# Patient Record
Sex: FEMALE | Race: WHITE | NOT HISPANIC OR LATINO | Employment: UNEMPLOYED | URBAN - METROPOLITAN AREA
[De-identification: names, ages, dates, MRNs, and addresses within clinical notes are randomized per-mention and may not be internally consistent; named-entity substitution may affect disease eponyms.]

---

## 2023-08-08 ENCOUNTER — HOSPITAL ENCOUNTER (OUTPATIENT)
Dept: DATA CONVERSION | Facility: HOSPITAL | Age: 43
Discharge: HOME | End: 2023-08-08

## 2023-08-08 DIAGNOSIS — E11.9 TYPE 2 DIABETES MELLITUS WITHOUT COMPLICATIONS (MULTI): ICD-10-CM

## 2023-08-08 LAB
ALBUMIN SERPL-MCNC: 3.9 GM/DL (ref 3.5–5)
ALBUMIN/GLOB SERPL: 1.4 RATIO (ref 1.5–3)
ALP BLD-CCNC: 86 U/L (ref 35–125)
ALT SERPL-CCNC: 23 U/L (ref 5–40)
ANION GAP SERPL CALCULATED.3IONS-SCNC: 11 MMOL/L (ref 0–19)
AST SERPL-CCNC: 20 U/L (ref 5–40)
BILIRUB SERPL-MCNC: 0.2 MG/DL (ref 0.1–1.2)
BUN SERPL-MCNC: 8 MG/DL (ref 8–25)
BUN/CREAT SERPL: 11.4 RATIO (ref 8–21)
CALCIUM SERPL-MCNC: 9.3 MG/DL (ref 8.5–10.4)
CHLORIDE SERPL-SCNC: 96 MMOL/L (ref 97–107)
CO2 SERPL-SCNC: 28 MMOL/L (ref 24–31)
CREAT SERPL-MCNC: 0.7 MG/DL (ref 0.4–1.6)
GFR SERPL CREATININE-BSD FRML MDRD: 109 ML/MIN/1.73 M2
GLOBULIN SER-MCNC: 2.8 G/DL (ref 1.9–3.7)
GLUCOSE SERPL-MCNC: 197 MG/DL (ref 65–99)
HBA1C MFR BLD: 9.2 % (ref 4–6)
POTASSIUM SERPL-SCNC: 4 MMOL/L (ref 3.4–5.1)
PROT SERPL-MCNC: 6.7 G/DL (ref 5.9–7.9)
SODIUM SERPL-SCNC: 135 MMOL/L (ref 133–145)

## 2023-08-10 ENCOUNTER — HOSPITAL ENCOUNTER (OUTPATIENT)
Dept: DATA CONVERSION | Facility: HOSPITAL | Age: 43
Discharge: HOME | End: 2023-08-10

## 2023-08-10 DIAGNOSIS — E11.9 TYPE 2 DIABETES MELLITUS WITHOUT COMPLICATIONS (MULTI): ICD-10-CM

## 2023-08-10 LAB
CREAT UR-MCNC: 49 MG/DL
MICROALBUMIN UR-MCNC: 12 MG/L (ref 0–23)
MICROALBUMIN/CREAT UR: 24.5 MG/G (ref 0–30)

## 2023-09-11 ENCOUNTER — HOSPITAL ENCOUNTER (OUTPATIENT)
Dept: DATA CONVERSION | Facility: HOSPITAL | Age: 43
Discharge: HOME | End: 2023-09-11
Payer: COMMERCIAL

## 2023-09-11 DIAGNOSIS — R07.9 CHEST PAIN, UNSPECIFIED: ICD-10-CM

## 2023-09-16 PROBLEM — E87.1 HYPONATREMIA: Status: ACTIVE | Noted: 2022-08-16

## 2023-09-16 PROBLEM — B02.9 HERPES ZOSTER: Status: ACTIVE | Noted: 2022-08-23

## 2023-09-16 PROBLEM — E11.9 DIABETES MELLITUS (MULTI): Status: ACTIVE | Noted: 2021-12-15

## 2023-09-16 PROBLEM — Z14.8 CARRIER OF GENETIC DISORDER: Status: ACTIVE | Noted: 2023-09-16

## 2023-09-16 PROBLEM — R45.1 RESTLESSNESS AND AGITATION: Status: ACTIVE | Noted: 2023-09-16

## 2023-09-16 PROBLEM — Z15.89 HLA B27 (HLA B27 POSITIVE): Status: ACTIVE | Noted: 2023-09-16

## 2023-09-16 PROBLEM — M79.7 FIBROMYALGIA SYNDROME: Status: ACTIVE | Noted: 2023-09-16

## 2023-09-16 PROBLEM — R53.83 FATIGUE: Status: ACTIVE | Noted: 2021-12-15

## 2023-09-16 PROBLEM — G83.4 CAUDA EQUINA SYNDROME (MULTI): Status: ACTIVE | Noted: 2022-03-01

## 2023-09-16 PROBLEM — E87.1 HYPO-OSMOLALITY AND HYPONATREMIA: Status: ACTIVE | Noted: 2023-09-16

## 2023-09-16 PROBLEM — G89.29 OTHER CHRONIC PAIN: Status: ACTIVE | Noted: 2023-09-16

## 2023-09-16 PROBLEM — R20.2 PARESTHESIA: Status: ACTIVE | Noted: 2023-09-16

## 2023-09-16 PROBLEM — M65.30 TRIGGER FINGER: Status: ACTIVE | Noted: 2023-02-21

## 2023-09-16 PROBLEM — E55.9 VITAMIN D DEFICIENCY: Status: ACTIVE | Noted: 2023-05-08

## 2023-09-16 PROBLEM — J45.909 ASTHMA (HHS-HCC): Status: ACTIVE | Noted: 2021-12-15

## 2023-09-16 PROBLEM — M45.9 ANKYLOSING SPONDYLITIS (MULTI): Status: ACTIVE | Noted: 2022-08-23

## 2023-09-16 PROBLEM — R63.4 WEIGHT LOSS: Status: ACTIVE | Noted: 2021-12-15

## 2023-09-16 PROBLEM — R29.898 LEG WEAKNESS: Status: ACTIVE | Noted: 2022-03-01

## 2023-09-16 PROBLEM — M79.2 THORACIC NEURALGIA: Status: ACTIVE | Noted: 2023-09-16

## 2023-09-16 PROBLEM — M47.816 LUMBAR SPONDYLOSIS: Status: ACTIVE | Noted: 2023-09-16

## 2023-09-16 PROBLEM — F41.9 ANXIETY: Status: ACTIVE | Noted: 2022-06-24

## 2023-09-16 PROBLEM — F32.A DEPRESSION: Status: ACTIVE | Noted: 2022-06-24

## 2023-09-16 PROBLEM — M47.817 LUMBOSACRAL SPONDYLOSIS WITHOUT MYELOPATHY: Status: ACTIVE | Noted: 2023-09-16

## 2023-09-16 PROBLEM — S66.919A RUPTURE OF TENDON OF HAND: Status: ACTIVE | Noted: 2023-07-14

## 2023-09-16 PROBLEM — G62.9 POLYNEUROPATHY: Status: ACTIVE | Noted: 2023-09-16

## 2023-09-16 PROBLEM — M13.80 OLIGOARTHRITIS: Status: ACTIVE | Noted: 2023-09-16

## 2023-09-16 PROBLEM — Z86.73 HISTORY OF CEREBROVASCULAR ACCIDENT: Status: ACTIVE | Noted: 2022-07-08

## 2023-09-16 PROBLEM — R82.90 ABNORMAL FINDING IN URINE: Status: ACTIVE | Noted: 2022-04-12

## 2023-09-16 PROBLEM — E11.40 DIABETIC NEUROPATHY (MULTI): Status: ACTIVE | Noted: 2021-12-15

## 2023-09-16 PROBLEM — E86.1 HYPOVOLEMIA: Status: ACTIVE | Noted: 2023-09-16

## 2023-09-16 PROBLEM — M06.9 RHEUMATOID ARTHRITIS (MULTI): Status: ACTIVE | Noted: 2023-09-16

## 2023-09-16 PROBLEM — F10.939 ALCOHOL WITHDRAWAL SYNDROME (MULTI): Status: ACTIVE | Noted: 2023-09-16

## 2023-09-16 PROBLEM — M47.814 THORACIC SPONDYLOSIS: Status: ACTIVE | Noted: 2023-09-16

## 2023-09-16 PROBLEM — E78.5 HYPERLIPIDEMIA: Status: ACTIVE | Noted: 2022-06-24

## 2023-09-16 PROBLEM — F41.1 GENERALIZED ANXIETY DISORDER: Status: ACTIVE | Noted: 2023-09-16

## 2023-09-16 PROBLEM — I63.9 STROKE (MULTI): Status: ACTIVE | Noted: 2022-06-24

## 2023-09-16 PROBLEM — L02.214 INGUINAL ABSCESS: Status: ACTIVE | Noted: 2021-10-14

## 2023-09-16 PROBLEM — E11.9 TYPE 2 DIABETES MELLITUS WITHOUT COMPLICATION (MULTI): Status: ACTIVE | Noted: 2023-02-16

## 2023-09-16 PROBLEM — F17.210 CIGARETTE NICOTINE DEPENDENCE: Status: ACTIVE | Noted: 2023-02-06

## 2023-09-16 PROBLEM — D68.59 HYPERCOAGULATION SYNDROME (MULTI): Status: ACTIVE | Noted: 2023-09-16

## 2023-09-16 PROBLEM — R41.82 ALTERED MENTAL STATUS: Status: ACTIVE | Noted: 2023-09-16

## 2023-09-16 PROBLEM — D68.8 HEREDITARY THROMBOPHILIA (MULTI): Status: ACTIVE | Noted: 2023-02-10

## 2023-09-16 RX ORDER — FLUTICASONE PROPIONATE 50 MCG
2 SPRAY, SUSPENSION (ML) NASAL DAILY
COMMUNITY
Start: 2022-08-16 | End: 2023-10-03 | Stop reason: ALTCHOICE

## 2023-09-16 RX ORDER — DICLOFENAC SODIUM 10 MG/G
2 GEL TOPICAL 4 TIMES DAILY PRN
COMMUNITY
Start: 2023-02-21 | End: 2023-10-03 | Stop reason: ALTCHOICE

## 2023-09-16 RX ORDER — LANCETS
EACH MISCELLANEOUS
COMMUNITY
End: 2024-03-13 | Stop reason: SDUPTHER

## 2023-09-16 RX ORDER — IBUPROFEN 200 MG
CAPSULE ORAL DAILY
COMMUNITY
Start: 2021-12-15 | End: 2024-03-13 | Stop reason: ALTCHOICE

## 2023-09-16 RX ORDER — ATORVASTATIN CALCIUM 20 MG/1
1 TABLET, FILM COATED ORAL DAILY
COMMUNITY
Start: 2022-07-08 | End: 2023-10-03 | Stop reason: ALTCHOICE

## 2023-09-16 RX ORDER — METFORMIN HYDROCHLORIDE 500 MG/1
3 TABLET, EXTENDED RELEASE ORAL DAILY
COMMUNITY
Start: 2022-12-29 | End: 2023-10-03 | Stop reason: SDUPTHER

## 2023-09-16 RX ORDER — OMEPRAZOLE 20 MG/1
1 CAPSULE, DELAYED RELEASE ORAL DAILY
COMMUNITY
Start: 2023-02-10 | End: 2023-10-03 | Stop reason: ALTCHOICE

## 2023-09-16 RX ORDER — TRAMADOL HYDROCHLORIDE 50 MG/1
1 TABLET ORAL 2 TIMES DAILY PRN
COMMUNITY
Start: 2023-02-06 | End: 2023-10-03 | Stop reason: ALTCHOICE

## 2023-09-16 RX ORDER — INSULIN DEGLUDEC 100 U/ML
32 INJECTION, SOLUTION SUBCUTANEOUS
COMMUNITY
Start: 2023-06-16 | End: 2024-02-20

## 2023-09-16 RX ORDER — CLOPIDOGREL BISULFATE 75 MG/1
1 TABLET ORAL DAILY
COMMUNITY

## 2023-09-16 RX ORDER — CHOLECALCIFEROL (VITAMIN D3) 1250 MCG
1 TABLET ORAL
COMMUNITY
Start: 2023-03-13 | End: 2024-01-05

## 2023-09-16 RX ORDER — METFORMIN HYDROCHLORIDE 500 MG/1
1 TABLET ORAL 3 TIMES DAILY
COMMUNITY
End: 2023-10-03 | Stop reason: SDUPTHER

## 2023-09-16 RX ORDER — ALBUTEROL SULFATE 90 UG/1
1 AEROSOL, METERED RESPIRATORY (INHALATION) EVERY 4 HOURS
COMMUNITY
End: 2023-10-03 | Stop reason: SDUPTHER

## 2023-09-16 RX ORDER — ALBUTEROL SULFATE 90 UG/1
2 AEROSOL, METERED RESPIRATORY (INHALATION) EVERY 6 HOURS PRN
COMMUNITY
Start: 2023-06-12 | End: 2023-10-03 | Stop reason: SDUPTHER

## 2023-09-16 RX ORDER — BECLOMETHASONE DIPROPIONATE HFA 80 UG/1
1 AEROSOL, METERED RESPIRATORY (INHALATION)
COMMUNITY
End: 2023-10-03 | Stop reason: ALTCHOICE

## 2023-09-16 RX ORDER — ATORVASTATIN CALCIUM 10 MG/1
1 TABLET, FILM COATED ORAL DAILY
COMMUNITY
End: 2023-10-03 | Stop reason: ALTCHOICE

## 2023-09-16 RX ORDER — DAPAGLIFLOZIN 5 MG/1
1 TABLET, FILM COATED ORAL DAILY
COMMUNITY
Start: 2023-06-16 | End: 2023-11-15

## 2023-09-21 ENCOUNTER — DOCUMENTATION (OUTPATIENT)
Dept: CARE COORDINATION | Facility: CLINIC | Age: 43
End: 2023-09-21
Payer: COMMERCIAL

## 2023-10-02 DIAGNOSIS — Z79.4 TYPE 2 DIABETES MELLITUS WITH DIABETIC POLYNEUROPATHY, WITH LONG-TERM CURRENT USE OF INSULIN (MULTI): Primary | ICD-10-CM

## 2023-10-02 DIAGNOSIS — J45.20 MILD INTERMITTENT ASTHMA, UNSPECIFIED WHETHER COMPLICATED (HHS-HCC): ICD-10-CM

## 2023-10-02 DIAGNOSIS — E11.42 TYPE 2 DIABETES MELLITUS WITH DIABETIC POLYNEUROPATHY, WITH LONG-TERM CURRENT USE OF INSULIN (MULTI): Primary | ICD-10-CM

## 2023-10-03 ENCOUNTER — TELEPHONE (OUTPATIENT)
Dept: PRIMARY CARE | Facility: CLINIC | Age: 43
End: 2023-10-03

## 2023-10-03 ENCOUNTER — OFFICE VISIT (OUTPATIENT)
Dept: PRIMARY CARE | Facility: CLINIC | Age: 43
End: 2023-10-03
Payer: COMMERCIAL

## 2023-10-03 VITALS
BODY MASS INDEX: 35.16 KG/M2 | DIASTOLIC BLOOD PRESSURE: 90 MMHG | TEMPERATURE: 98.6 F | OXYGEN SATURATION: 99 % | HEART RATE: 101 BPM | HEIGHT: 67 IN | SYSTOLIC BLOOD PRESSURE: 142 MMHG | WEIGHT: 224 LBS

## 2023-10-03 DIAGNOSIS — J06.9 ACUTE URI: Primary | ICD-10-CM

## 2023-10-03 DIAGNOSIS — R05.1 ACUTE COUGH: ICD-10-CM

## 2023-10-03 DIAGNOSIS — M79.10 MYALGIA: ICD-10-CM

## 2023-10-03 PROCEDURE — 3046F HEMOGLOBIN A1C LEVEL >9.0%: CPT | Performed by: FAMILY MEDICINE

## 2023-10-03 PROCEDURE — 3077F SYST BP >= 140 MM HG: CPT | Performed by: FAMILY MEDICINE

## 2023-10-03 PROCEDURE — 3080F DIAST BP >= 90 MM HG: CPT | Performed by: FAMILY MEDICINE

## 2023-10-03 PROCEDURE — 99213 OFFICE O/P EST LOW 20 MIN: CPT | Performed by: FAMILY MEDICINE

## 2023-10-03 RX ORDER — METHOCARBAMOL 500 MG/1
TABLET, FILM COATED ORAL
COMMUNITY
Start: 2023-08-11 | End: 2023-11-15 | Stop reason: WASHOUT

## 2023-10-03 RX ORDER — ASPIRIN 81 MG/1
TABLET ORAL
COMMUNITY
Start: 2022-08-13

## 2023-10-03 RX ORDER — OXYBUTYNIN CHLORIDE 10 MG/1
10 TABLET, EXTENDED RELEASE ORAL
COMMUNITY
Start: 2023-09-13 | End: 2024-02-28 | Stop reason: ALTCHOICE

## 2023-10-03 RX ORDER — GABAPENTIN 300 MG/1
300 CAPSULE ORAL 3 TIMES DAILY
COMMUNITY
Start: 2023-09-25 | End: 2023-10-03 | Stop reason: SINTOL

## 2023-10-03 RX ORDER — ALBUTEROL SULFATE 90 UG/1
AEROSOL, METERED RESPIRATORY (INHALATION)
Qty: 36 G | Refills: 0 | Status: SHIPPED | OUTPATIENT
Start: 2023-10-03

## 2023-10-03 RX ORDER — BENZONATATE 200 MG/1
200 CAPSULE ORAL 3 TIMES DAILY PRN
Qty: 20 CAPSULE | Refills: 0 | Status: SHIPPED | OUTPATIENT
Start: 2023-10-03 | End: 2023-11-15 | Stop reason: ALTCHOICE

## 2023-10-03 RX ORDER — ESCITALOPRAM OXALATE 10 MG/1
10 TABLET ORAL NIGHTLY
COMMUNITY
Start: 2023-08-10 | End: 2023-10-03 | Stop reason: SINTOL

## 2023-10-03 RX ORDER — ALBUTEROL SULFATE 90 UG/1
2 AEROSOL, METERED RESPIRATORY (INHALATION) EVERY 6 HOURS PRN
Qty: 18 G | Refills: 0 | Status: SHIPPED | OUTPATIENT
Start: 2023-10-03 | End: 2023-10-03 | Stop reason: SDUPTHER

## 2023-10-03 RX ORDER — METFORMIN HYDROCHLORIDE 500 MG/1
1500 TABLET, EXTENDED RELEASE ORAL DAILY
Qty: 270 TABLET | Refills: 0 | Status: SHIPPED | OUTPATIENT
Start: 2023-10-03 | End: 2024-01-16

## 2023-10-03 RX ORDER — HYDROCODONE BITARTRATE AND ACETAMINOPHEN 5; 325 MG/1; MG/1
TABLET ORAL
COMMUNITY
Start: 2023-07-14 | End: 2023-10-03 | Stop reason: ALTCHOICE

## 2023-10-03 RX ORDER — DULOXETIN HYDROCHLORIDE 60 MG/1
CAPSULE, DELAYED RELEASE ORAL
COMMUNITY
Start: 2022-10-23 | End: 2023-10-03 | Stop reason: SINTOL

## 2023-10-03 RX ORDER — FLASH GLUCOSE SENSOR
KIT MISCELLANEOUS
COMMUNITY
Start: 2023-06-08 | End: 2023-10-03 | Stop reason: ALTCHOICE

## 2023-10-03 RX ORDER — PEN NEEDLE, DIABETIC 31 GX3/16"
NEEDLE, DISPOSABLE MISCELLANEOUS
COMMUNITY
Start: 2023-09-21

## 2023-10-03 RX ORDER — AMITRIPTYLINE HYDROCHLORIDE 25 MG/1
1 TABLET, FILM COATED ORAL NIGHTLY
COMMUNITY
Start: 2023-09-29 | End: 2023-11-20 | Stop reason: SDDI

## 2023-10-03 ASSESSMENT — ENCOUNTER SYMPTOMS
NECK PAIN: 1
MYALGIAS: 1
RHINORRHEA: 0
HEADACHES: 0
SORE THROAT: 0
BACK PAIN: 1
WHEEZING: 1
COUGH: 1
SINUS PRESSURE: 1

## 2023-10-03 ASSESSMENT — PATIENT HEALTH QUESTIONNAIRE - PHQ9
2. FEELING DOWN, DEPRESSED OR HOPELESS: MORE THAN HALF THE DAYS
SUM OF ALL RESPONSES TO PHQ9 QUESTIONS 1 AND 2: 2
1. LITTLE INTEREST OR PLEASURE IN DOING THINGS: NOT AT ALL
10. IF YOU CHECKED OFF ANY PROBLEMS, HOW DIFFICULT HAVE THESE PROBLEMS MADE IT FOR YOU TO DO YOUR WORK, TAKE CARE OF THINGS AT HOME, OR GET ALONG WITH OTHER PEOPLE: NOT DIFFICULT AT ALL

## 2023-10-03 ASSESSMENT — PAIN SCALES - GENERAL: PAINLEVEL: 7

## 2023-10-03 NOTE — TELEPHONE ENCOUNTER
FYI  Pt sent me this email last night, 10/2/23  Nicole I am sorry but I can no longer log in to everything. I am having a very hard time moving my neck from side to side. I wanted to ask for the neurologist, or pain management, or demetrius to figure out what to do because the last time i couldnt move it I had a stroke. [facility] doesnt offer any portal anymore to ask    Instructed pt to call answering service for symptom evaluation     Patient calm. Wife is not present.   Signed by chaplain Marley

## 2023-10-03 NOTE — PROGRESS NOTES
"Subjective   Patient ID: Alanna Hickman is a 42 y.o. female who presents for Back Pain (X 5 days ), Cough, and Neck Pain (X 1 day /States feels the same as when pt had carotid artery dissection last year ).  The back pain is located in the mid back where her bra is located.  The pain moved up to the base of the neck.  The cough is productive with clear sputum.  She also admits to wheezing and sinus pressure.  She denies throat pain, otalgia, rhinorrhea, ear pressure, headaches, and post nasal drainage.  She took a left-over prednisone pill which alleviated the symptoms greatly.  She has not taken anything else for the current symptoms including the albuterol inhaler.  She is out of the inhaler and refills.  Her daughter has URI symptoms as well.  When she coughs, the back is sore as well as the ribs.  She had pain with movement two days ago.  The cough is better today.      Review of Systems   HENT:  Positive for sinus pressure. Negative for ear pain, postnasal drip, rhinorrhea and sore throat.    Respiratory:  Positive for cough and wheezing.    Musculoskeletal:  Positive for back pain, myalgias and neck pain.   Neurological:  Negative for headaches.     Objective   /90 (BP Location: Left arm)   Pulse 101   Temp 37 °C (98.6 °F) (Temporal)   Ht 1.702 m (5' 7\")   Wt 102 kg (224 lb)   SpO2 99%   BMI 35.08 kg/m²     Physical Exam  Constitutional:       Appearance: Normal appearance. She is obese.   HENT:      Right Ear: Hearing, ear canal and external ear normal. A middle ear effusion is present. Tympanic membrane is not erythematous.      Left Ear: Hearing, ear canal and external ear normal. A middle ear effusion is present. Tympanic membrane is not erythematous.      Nose: Mucosal edema present. No rhinorrhea.      Right Turbinates: Swollen.      Left Turbinates: Swollen.      Mouth/Throat:      Mouth: Mucous membranes are moist.      Pharynx: Oropharynx is clear.   Eyes:      Conjunctiva/sclera: " Conjunctivae normal.   Neurological:      Mental Status: She is alert.     Assessment/Plan   Problem List Items Addressed This Visit    None  Visit Diagnoses         Codes    Acute URI    -  Primary J06.9    Acute cough     R05.1    Relevant Medications    albuterol (Ventolin HFA) 90 mcg/actuation inhaler    benzonatate (Tessalon) 200 mg capsule    Myalgia     M79.10        New.  Appears viral at this time.  Tessalon Perles prescribed.  Albuterol inhaler refilled.  Moist heat to back and neck to help muscle relax.  May take home supply of Robaxin.  Adequate hydration.  Get plenty of rest.  Follow up if colored sputum or rhinorrhea occurs.

## 2023-10-03 NOTE — TELEPHONE ENCOUNTER
It looks like she is coming in today for acute visit with LYL  Agree with need for Neurology visit

## 2023-10-04 ENCOUNTER — PROCEDURE VISIT (OUTPATIENT)
Dept: PAIN MEDICINE | Facility: CLINIC | Age: 43
End: 2023-10-04
Payer: COMMERCIAL

## 2023-10-04 DIAGNOSIS — G62.9 POLYNEUROPATHY: ICD-10-CM

## 2023-10-04 DIAGNOSIS — R20.2 PARESTHESIA: Primary | ICD-10-CM

## 2023-10-04 DIAGNOSIS — G56.03 BILATERAL CARPAL TUNNEL SYNDROME: ICD-10-CM

## 2023-10-04 PROCEDURE — 95886 MUSC TEST DONE W/N TEST COMP: CPT | Performed by: PHYSICAL MEDICINE & REHABILITATION

## 2023-10-04 PROCEDURE — 95911 NRV CNDJ TEST 9-10 STUDIES: CPT | Performed by: PHYSICAL MEDICINE & REHABILITATION

## 2023-10-04 NOTE — PROGRESS NOTES
Patient ID: Alanna Hickman is a 42 y.o. female.    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  A concomitant carpal tunnel syndrome cannot be excluded in the setting of the polyneuropathy.    There is no evidence of motor radiculopathy or myopathy in the upper limbs.

## 2023-10-05 ENCOUNTER — APPOINTMENT (OUTPATIENT)
Dept: RADIOLOGY | Facility: CLINIC | Age: 43
End: 2023-10-05
Payer: COMMERCIAL

## 2023-10-12 ENCOUNTER — PREP FOR PROCEDURE (OUTPATIENT)
Dept: PAIN MEDICINE | Facility: CLINIC | Age: 43
End: 2023-10-12
Payer: COMMERCIAL

## 2023-10-18 ENCOUNTER — PATIENT OUTREACH (OUTPATIENT)
Dept: CARE COORDINATION | Facility: CLINIC | Age: 43
End: 2023-10-18
Payer: COMMERCIAL

## 2023-10-18 NOTE — CARE PLAN
This SW has sent Pt an email today asking if she has been connected to a Behavioral Health Agency such as [facility] or [facility] (previous notes in previous emr indicate that the Pt was working on getting connected to an agency for anxiety/depression.   Problem: Coordination of Community Resources Needed  Goal: Coordination of Services will be Obtained  Outcome: Progressing  Intervention: Coordinate care to local community resources  Note: Per notes on 9/29/23:  Pt had met with CNP/Psych.  This SW needed more clarification from Pt. Email was sent.      Problem: Coordination of Psychosocial Support Services Needed  Goal: Coordination of Services will be Obtained  Outcome: Progressing

## 2023-10-19 ENCOUNTER — TELEPHONE (OUTPATIENT)
Dept: PRIMARY CARE | Facility: CLINIC | Age: 43
End: 2023-10-19

## 2023-10-20 ENCOUNTER — OFFICE VISIT (OUTPATIENT)
Dept: PAIN MEDICINE | Facility: CLINIC | Age: 43
End: 2023-10-20
Payer: COMMERCIAL

## 2023-10-20 VITALS — SYSTOLIC BLOOD PRESSURE: 118 MMHG | DIASTOLIC BLOOD PRESSURE: 80 MMHG | HEART RATE: 83 BPM | RESPIRATION RATE: 22 BRPM

## 2023-10-20 DIAGNOSIS — M47.817 LUMBOSACRAL SPONDYLOSIS WITHOUT MYELOPATHY: ICD-10-CM

## 2023-10-20 DIAGNOSIS — G56.03 BILATERAL CARPAL TUNNEL SYNDROME: Primary | ICD-10-CM

## 2023-10-20 PROCEDURE — 3046F HEMOGLOBIN A1C LEVEL >9.0%: CPT | Performed by: ANESTHESIOLOGY

## 2023-10-20 PROCEDURE — 3079F DIAST BP 80-89 MM HG: CPT | Performed by: ANESTHESIOLOGY

## 2023-10-20 PROCEDURE — 99214 OFFICE O/P EST MOD 30 MIN: CPT | Performed by: ANESTHESIOLOGY

## 2023-10-20 PROCEDURE — 3074F SYST BP LT 130 MM HG: CPT | Performed by: ANESTHESIOLOGY

## 2023-10-20 ASSESSMENT — LIFESTYLE VARIABLES
HOW MANY STANDARD DRINKS CONTAINING ALCOHOL DO YOU HAVE ON A TYPICAL DAY: PATIENT DOES NOT DRINK
TOTAL SCORE: 0

## 2023-10-20 ASSESSMENT — ENCOUNTER SYMPTOMS
MYALGIAS: 1
ACTIVITY CHANGE: 1
NUMBNESS: 1
BACK PAIN: 1
ARTHRALGIAS: 1

## 2023-10-20 ASSESSMENT — PATIENT HEALTH QUESTIONNAIRE - PHQ9
1. LITTLE INTEREST OR PLEASURE IN DOING THINGS: NOT AT ALL
2. FEELING DOWN, DEPRESSED OR HOPELESS: NOT AT ALL
SUM OF ALL RESPONSES TO PHQ9 QUESTIONS 1 & 2: 0

## 2023-10-20 ASSESSMENT — PAIN - FUNCTIONAL ASSESSMENT: PAIN_FUNCTIONAL_ASSESSMENT: 0-10

## 2023-10-20 ASSESSMENT — PAIN DESCRIPTION - DESCRIPTORS: DESCRIPTORS: BURNING;SHOOTING;SPASM

## 2023-10-20 ASSESSMENT — PAIN SCALES - GENERAL: PAINLEVEL_OUTOF10: 9

## 2023-10-20 NOTE — PROGRESS NOTES
The patient is a 42-year-old female with low back pain as well as bilateral hand pain.  The patient is here to discuss the results of her bilateral upper extremity EMG which showed median neuropathy.  The low back pain persist.  The patient was unable to keep her appointment for radiofrequency ablation of the scheduled for last month.  The patient would like to reschedule at some point in the future.    Review of Systems   Constitutional:  Positive for activity change.   Musculoskeletal:  Positive for arthralgias, back pain and myalgias.   Neurological:  Positive for numbness.   All other systems reviewed and are negative.    GENERAL: alert and appropriate, in no distress, well-hydrated, well-nourished and interactive  SKIN: no rash noted  RESPIRATORY: breathing non-labored and no grunting/flaring/retractions  CHEST: equal chest rise with normal respiratory effort  ABDOMEN: soft and non-tender  BACK: back normal in appearance, spine with reduced ROM  EXTREMITIES: strength intact  NEUROLOGIC: gait antalgic, SLR negative, sensation grossly intact, Phalen's and Tinel's reproduced pain    Assessment and Plan    -Chronicity--chronic spinal and neuropathic pain    -Diagnostics--we reviewed the results of the patient's EMG    -Pharmacologic--no change    -Psychologic--no need for psychologic intervention from my standpoint    -Physical--we discussed the importance of physical therapy and exercise.  We discussed avoidance and modification techniques.    -Intervention--I would like the patient have a consultation with Dr. Ashraf regarding her median neuropathy.  We will proceed with radiofrequency ablation of the facet medial nerve branches bilaterally at the L4-5 and the L5-S1 levels when appropriate.    I spent time educating the patient on the condition including the treatment and the prognosis.  I invited the patient to call at anytime with any questions.

## 2023-11-02 ENCOUNTER — HOSPITAL ENCOUNTER (OUTPATIENT)
Facility: HOSPITAL | Age: 43
Setting detail: OUTPATIENT SURGERY
End: 2023-11-02
Attending: ANESTHESIOLOGY | Admitting: ANESTHESIOLOGY
Payer: COMMERCIAL

## 2023-11-02 ENCOUNTER — PREP FOR PROCEDURE (OUTPATIENT)
Dept: PAIN MEDICINE | Facility: CLINIC | Age: 43
End: 2023-11-02
Payer: COMMERCIAL

## 2023-11-02 DIAGNOSIS — M54.51 VERTEBROGENIC LOW BACK PAIN: Primary | ICD-10-CM

## 2023-11-02 RX ORDER — SODIUM CHLORIDE, SODIUM LACTATE, POTASSIUM CHLORIDE, CALCIUM CHLORIDE 600; 310; 30; 20 MG/100ML; MG/100ML; MG/100ML; MG/100ML
100 INJECTION, SOLUTION INTRAVENOUS CONTINUOUS
Status: CANCELLED | OUTPATIENT
Start: 2023-11-02

## 2023-11-05 DIAGNOSIS — E11.9 TYPE 2 DIABETES MELLITUS WITHOUT COMPLICATION, UNSPECIFIED WHETHER LONG TERM INSULIN USE (MULTI): ICD-10-CM

## 2023-11-06 RX ORDER — ARIPIPRAZOLE 5 MG/1
5 TABLET ORAL NIGHTLY
COMMUNITY
Start: 2023-10-27 | End: 2023-11-20 | Stop reason: SDDI

## 2023-11-06 RX ORDER — BLOOD-GLUCOSE SENSOR
EACH MISCELLANEOUS
Qty: 9 EACH | Refills: 3 | Status: SHIPPED | OUTPATIENT
Start: 2023-11-06 | End: 2024-01-05 | Stop reason: ALTCHOICE

## 2023-11-13 ENCOUNTER — APPOINTMENT (OUTPATIENT)
Dept: PRIMARY CARE | Facility: CLINIC | Age: 43
End: 2023-11-13
Payer: COMMERCIAL

## 2023-11-15 ENCOUNTER — OFFICE VISIT (OUTPATIENT)
Dept: PRIMARY CARE | Facility: CLINIC | Age: 43
End: 2023-11-15
Payer: COMMERCIAL

## 2023-11-15 VITALS
TEMPERATURE: 99 F | OXYGEN SATURATION: 95 % | DIASTOLIC BLOOD PRESSURE: 82 MMHG | BODY MASS INDEX: 35.24 KG/M2 | HEART RATE: 89 BPM | WEIGHT: 225 LBS | SYSTOLIC BLOOD PRESSURE: 128 MMHG

## 2023-11-15 DIAGNOSIS — M79.7 FIBROMYALGIA SYNDROME: Primary | ICD-10-CM

## 2023-11-15 DIAGNOSIS — E11.9 TYPE 2 DIABETES MELLITUS WITHOUT COMPLICATION, UNSPECIFIED WHETHER LONG TERM INSULIN USE (MULTI): ICD-10-CM

## 2023-11-15 LAB — POC HEMOGLOBIN A1C: 8.6 % (ref 4.2–6.5)

## 2023-11-15 PROCEDURE — 99213 OFFICE O/P EST LOW 20 MIN: CPT | Performed by: PHYSICIAN ASSISTANT

## 2023-11-15 PROCEDURE — 3074F SYST BP LT 130 MM HG: CPT | Performed by: PHYSICIAN ASSISTANT

## 2023-11-15 PROCEDURE — 83036 HEMOGLOBIN GLYCOSYLATED A1C: CPT | Performed by: PHYSICIAN ASSISTANT

## 2023-11-15 PROCEDURE — 3079F DIAST BP 80-89 MM HG: CPT | Performed by: PHYSICIAN ASSISTANT

## 2023-11-15 PROCEDURE — 3046F HEMOGLOBIN A1C LEVEL >9.0%: CPT | Performed by: PHYSICIAN ASSISTANT

## 2023-11-15 PROCEDURE — 4004F PT TOBACCO SCREEN RCVD TLK: CPT | Performed by: PHYSICIAN ASSISTANT

## 2023-11-15 RX ORDER — METHOCARBAMOL 500 MG/1
500 TABLET, FILM COATED ORAL 3 TIMES DAILY
Qty: 40 TABLET | Refills: 1 | Status: SHIPPED | OUTPATIENT
Start: 2023-11-15 | End: 2024-02-15 | Stop reason: ALTCHOICE

## 2023-11-15 RX ORDER — DAPAGLIFLOZIN 10 MG/1
10 TABLET, FILM COATED ORAL DAILY
Qty: 30 TABLET | Refills: 3 | Status: SHIPPED | OUTPATIENT
Start: 2023-11-15 | End: 2024-03-27

## 2023-11-15 RX ORDER — GABAPENTIN 300 MG/1
300 CAPSULE ORAL 3 TIMES DAILY
COMMUNITY
Start: 2023-10-28 | End: 2024-01-05

## 2023-11-15 RX ORDER — DESVENLAFAXINE SUCCINATE 25 MG/1
25 TABLET, EXTENDED RELEASE ORAL DAILY
COMMUNITY
Start: 2023-10-19 | End: 2024-05-23

## 2023-11-15 ASSESSMENT — ENCOUNTER SYMPTOMS
NUMBNESS: 1
SORE THROAT: 1
BLURRED VISION: 0
CONFUSION: 1
HEADACHES: 0
DIZZINESS: 0
LIGHT-HEADEDNESS: 0
NERVOUS/ANXIOUS: 1
SEIZURES: 0

## 2023-11-15 ASSESSMENT — PAIN SCALES - GENERAL: PAINLEVEL: 5

## 2023-11-15 NOTE — PROGRESS NOTES
Subjective   Patient ID: Alanna Hickman is a 43 y.o. female who presents for Diabetes and Sore Throat (Since this AM).    Diabetes  She presents for her follow-up diabetic visit. She has type 2 diabetes mellitus. Onset time: saw Dr. Ratliff last month.Will seein 4 months. Her disease course has been improving. Hypoglycemia symptoms include confusion and nervousness/anxiousness. Pertinent negatives for hypoglycemia include no dizziness, headaches or seizures. Associated symptoms include foot paresthesias. Pertinent negatives for diabetes include no blurred vision and no foot ulcerations. There are no hypoglycemic complications. Symptoms are stable. Risk factors for coronary artery disease include tobacco exposure and stress. She is compliant with treatment most of the time.   Sore Throat   This is a new problem. The current episode started yesterday. The problem has been unchanged. Neither side of throat is experiencing more pain than the other. There has been no fever. Pertinent negatives include no headaches.        Review of Systems   HENT:  Positive for sore throat.    Eyes:  Negative for blurred vision.   Neurological:  Positive for numbness. Negative for dizziness, seizures, light-headedness and headaches.   Psychiatric/Behavioral:  Positive for confusion. The patient is nervous/anxious.        Objective   /82   Pulse 89   Temp 37.2 °C (99 °F)   Wt 102 kg (225 lb)   SpO2 95%   BMI 35.24 kg/m²     Physical Exam  HENT:      Mouth/Throat:      Mouth: Mucous membranes are moist.   Eyes:      Extraocular Movements: Extraocular movements intact.      Pupils: Pupils are equal, round, and reactive to light.   Cardiovascular:      Rate and Rhythm: Normal rate.      Pulses: Normal pulses.      Heart sounds: No murmur heard.  Pulmonary:      Effort: Pulmonary effort is normal.   Musculoskeletal:      Cervical back: Normal range of motion. No tenderness.   Skin:     General: Skin is warm.   Neurological:       General: No focal deficit present.      Mental Status: She is alert. Mental status is at baseline.      Sensory: Sensory deficit present.   Psychiatric:         Mood and Affect: Mood normal.         Thought Content: Thought content normal.         Judgment: Judgment normal.         Assessment/Plan   Diagnoses and all orders for this visit:  Fibromyalgia syndrome  -     methocarbamol (Robaxin) 500 mg tablet; Take 1 tablet (500 mg) by mouth 3 times a day.  Type 2 diabetes mellitus without complication, unspecified whether long term insulin use (CMS/[facility])  -     POCT glycosylated hemoglobin (Hb A1C) manually resulted  -     Comprehensive Metabolic Panel; Future  -     dapagliflozin propanediol (Farxiga) 10 mg; Take 1 tablet (10 mg) by mouth once daily.  Other orders  -     Follow Up In Primary Care - Established; Future    Also discussed her insurance requesting her to return the CPAP machine.  She is waiting to try it once again.  We will [place] [facility] [place] that.

## 2023-11-20 ENCOUNTER — CLINICAL SUPPORT (OUTPATIENT)
Dept: PRIMARY CARE | Facility: CLINIC | Age: 43
End: 2023-11-20
Payer: COMMERCIAL

## 2023-11-20 DIAGNOSIS — E11.9 TYPE 2 DIABETES MELLITUS WITHOUT COMPLICATION, UNSPECIFIED WHETHER LONG TERM INSULIN USE (MULTI): ICD-10-CM

## 2023-11-20 DIAGNOSIS — R30.0 DYSURIA: ICD-10-CM

## 2023-11-20 PROCEDURE — 87086 URINE CULTURE/COLONY COUNT: CPT

## 2023-11-20 NOTE — PATIENT INSTRUCTIONS
Use Dexcom reader to check sugars.  Be sure to charge reader nightly.  Start Pristiq once daily   Restart oxybutinin   Follow up with Nicole in 4-6 weeks

## 2023-11-20 NOTE — PROGRESS NOTES
Diabetes Management  E11.9    HPI  Alanna Hickman is a 43 y.o. female who presents for a follow-up evaluation of her Type 2 diabetes mellitus.   She has been having trouble with her Dexcom sensors.  She states that she has an older phone and is unable to pair her sensor.  Pt also states that she believes she has a UTI/yeast infection.  She brings a urine sample with her to OV.     Referring Provider: Ansley Shetty PA-C     CURRENT DM PHARMACOTHERAPY    LAB REVIEW   Lab Results   Component Value Date    HGBA1C 8.6 (A) 11/15/2023    HGBA1C 9.2 (H) 08/08/2023    HGBA1C 7.8 (H) 05/19/2023     Lab Results   Component Value Date    CREATININE 0.7 08/08/2023    GLUCOSE 197 (H) 08/08/2023     Lab Results   Component Value Date    TRIG 227 (H) 02/16/2023    CHOL 184 02/16/2023    LDLCALC 95 02/16/2023    HDL 44 (L) 02/16/2023       HISTORICAL PHARMACOTHERAPY  Current Outpatient Medications on File Prior to Visit   Medication Sig Dispense Refill    aspirin 81 mg EC tablet       blood sugar diagnostic (Blood Glucose Test) strip once daily. Check sugars      cholecalciferol (Vitamin D3) 1,250 mcg (50,000 unit) tablet Take 1 tablet (50,000 Units) by mouth 1 (one) time per week.      clopidogrel (Plavix) 75 mg tablet Take 1 tablet (75 mg) by mouth once daily.      dapagliflozin propanediol (Farxiga) 10 mg Take 1 tablet (10 mg) by mouth once daily. 30 tablet 3    desvenlafaxine succinate (Pristiq) 25 mg 24 hour tablet Take 1 tablet (25 mg) by mouth once daily.      Dexcom G7 Sensor device CHANGE SENSOR EVERY 10 DAYS AS DIRECTED 9 each 3    gabapentin (Neurontin) 300 mg capsule Take 1 capsule (300 mg) by mouth 3 times a day.      [DISCONTINUED] amitriptyline (Elavil) 25 mg tablet Take 1 tablet (25 mg) by mouth once daily at bedtime.      insulin degludec (Tresiba FlexTouch U-100) 100 unit/mL (3 mL) injection Inject 28 Units under the skin. As directed      lancets misc As directed      metFORMIN  mg 24 hr tablet  "TAKE THREE (3) TABLETS BY MOUTH EVERY  tablet 0    methocarbamol (Robaxin) 500 mg tablet Take 1 tablet (500 mg) by mouth 3 times a day. 40 tablet 1    oxybutynin XL (Ditropan-XL) 10 mg 24 hr tablet Take 1 tablet (10 mg) by mouth once daily.      Sure Comfort Pen Needle 31 gauge x 3/16\" needle use once daily as directed      Ventolin HFA 90 mcg/actuation inhaler INHALE TWO PUFFS BY MOUTH EVERY SIX HOURS AS NEEDED for shortness of breath or wheezing 36 g 0    [DISCONTINUED] ARIPiprazole (Abilify) 5 mg tablet Take 1 tablet (5 mg) by mouth once daily at bedtime.      [DISCONTINUED] benzonatate (Tessalon) 200 mg capsule Take 1 capsule (200 mg) by mouth 3 times a day as needed for cough for up to 20 doses. Do not crush or chew. (Patient not taking: Reported on 11/15/2023) 20 capsule 0    [DISCONTINUED] dapagliflozin propanediol (Farxiga) 5 mg Take 1 tablet (5 mg) by mouth once daily.      [DISCONTINUED] methocarbamol (Robaxin) 500 mg tablet TAKE ONE TABLET BY MOUTH THREE TIMES A DAY AS NEEDED FOR MUSCLE PAIN       No current facility-administered medications on file prior to visit.       SMBG MEASUREMENTS  Not checking at home    CGM DEVICE/REPORT FINDINGS  Has not been using due to inabillity to pair sensor.       RECOMMENDATIONS/PLAN  Pt diabetes is poorly controlled with most recent A1c of 8.6% (goal < 7 %).   A1c decreased 0.6% since last visit / 3 months  Discussed disease specific goals:   - Fasting B - 130 mg/dL  - Postprandial BG: less than 180 mg/dL  - A1c: less than 7%  2.    Discussed possibly discontinuing Farxiga.  Past hx of pancreatitis elminates DPP4/GLP-1 as choice for medication therapy   3.  During review of medication list patient states that she is not currently taking any mental health medications.  Long discussion regarding SE profile of Pristiq.  Suggested pt start medication and take for at least 7 days without stopping.  If any side effects occur she is to call prescribing doctor for " guidance.   4.  Patient also states that she stopped taking oxybutinin.  Suggest restarting medication to prevent incontinence.       Plan:  Use Dexcom reader to check sugars.  Be sure to charge reader nightly.  Start Pristiq once daily   Restart oxybutinin   Sending urine sample to lab for evaluation  Follow up with Nicole in 4-6 weeks    Treatment and plan discussed with MARISOL GOODWIN PA-C, NICOLE ESPINAL [facility], [facility]    Verbal consent to manage patient's drug therapy was obtained from the patient or an individual authorized to act on behalf of the patient.  Patient was informed they may decline to participate or withdraw from participation in pharmacy services at any time.

## 2023-11-22 LAB — BACTERIA UR CULT: NORMAL

## 2023-12-04 ENCOUNTER — PATIENT OUTREACH (OUTPATIENT)
Dept: CARE COORDINATION | Facility: CLINIC | Age: 43
End: 2023-12-04
Payer: COMMERCIAL

## 2023-12-04 NOTE — CARE PLAN
This  has attempted to reach this patient email and voicemail today as follow up.   NOTE: Patient has been connected to [facility].  This  may close this case soon due to not being to update care plan.   Problem: Coordination of Community Resources Needed  Goal: Coordination of Services will be Obtained  Outcome: Progressing     Problem: Coordination of Psychosocial Support Services Needed  Goal: Coordination of Services will be Obtained  Outcome: Progressing

## 2023-12-07 ENCOUNTER — PATIENT OUTREACH (OUTPATIENT)
Dept: CARE COORDINATION | Facility: CLINIC | Age: 43
End: 2023-12-07
Payer: COMMERCIAL

## 2023-12-07 NOTE — CARE PLAN
"Email from patient/Alanna: \"Pretty badly but coping. Haven't heard back from [facility]. Had to send back in my cpap machine because the insurance rejected it. Not expecting anything\"  This  response: \"Hi,  I am sorry to hear about your CPAP Machine.  I am sure that your provider is working on this matter.  At this time, I noticed that you are still connected to [facility].  I am hoping that is going good for you.  I am not sure what therapy approaches that you are working on for right now.  Let me know if you need anything else at this time.  Thank you.\"  NOTE: At this time, In EPIC is noted that patient is engaging with [facility] and as part of Patient's care plan was to be connected with mental health provider.  This  also noted that provider is working on cpap issue as noted in [facility] encounter notes.   At this time, this  is waiting for a response from patient to see if there is anything else that this  can assist with.    Shari Cheek MSW LSW   12/7/23.     Problem: Coordination of Community Resources Needed  Goal: Coordination of Services will be Obtained  Outcome: Progressing     "

## 2023-12-27 ENCOUNTER — APPOINTMENT (OUTPATIENT)
Dept: PRIMARY CARE | Facility: CLINIC | Age: 43
End: 2023-12-27
Payer: COMMERCIAL

## 2024-01-04 ENCOUNTER — APPOINTMENT (OUTPATIENT)
Dept: PRIMARY CARE | Facility: CLINIC | Age: 44
End: 2024-01-04
Payer: COMMERCIAL

## 2024-01-05 ENCOUNTER — OFFICE VISIT (OUTPATIENT)
Dept: PRIMARY CARE | Facility: CLINIC | Age: 44
End: 2024-01-05
Payer: COMMERCIAL

## 2024-01-05 ENCOUNTER — LAB (OUTPATIENT)
Dept: LAB | Facility: LAB | Age: 44
End: 2024-01-05
Payer: COMMERCIAL

## 2024-01-05 ENCOUNTER — CLINICAL SUPPORT (OUTPATIENT)
Dept: PRIMARY CARE | Facility: CLINIC | Age: 44
End: 2024-01-05
Payer: COMMERCIAL

## 2024-01-05 VITALS
WEIGHT: 221 LBS | BODY MASS INDEX: 34.61 KG/M2 | SYSTOLIC BLOOD PRESSURE: 114 MMHG | DIASTOLIC BLOOD PRESSURE: 84 MMHG | HEART RATE: 88 BPM | TEMPERATURE: 98.3 F

## 2024-01-05 DIAGNOSIS — Z79.4 TYPE 2 DIABETES MELLITUS WITHOUT COMPLICATION, WITH LONG-TERM CURRENT USE OF INSULIN (MULTI): Primary | ICD-10-CM

## 2024-01-05 DIAGNOSIS — M54.51 VERTEBROGENIC LOW BACK PAIN: ICD-10-CM

## 2024-01-05 DIAGNOSIS — E11.9 TYPE 2 DIABETES MELLITUS WITHOUT COMPLICATION, WITH LONG-TERM CURRENT USE OF INSULIN (MULTI): Primary | ICD-10-CM

## 2024-01-05 DIAGNOSIS — M25.40 JOINT SWELLING: ICD-10-CM

## 2024-01-05 DIAGNOSIS — M79.641 PAIN OF RIGHT HAND: Primary | ICD-10-CM

## 2024-01-05 DIAGNOSIS — M79.641 PAIN OF RIGHT HAND: ICD-10-CM

## 2024-01-05 LAB
ALBUMIN SERPL-MCNC: 4.3 G/DL (ref 3.5–5)
ALP BLD-CCNC: 89 U/L (ref 35–125)
ALT SERPL-CCNC: 13 U/L (ref 5–40)
ANION GAP SERPL CALC-SCNC: 15 MMOL/L
AST SERPL-CCNC: 12 U/L (ref 5–40)
BILIRUB SERPL-MCNC: 0.2 MG/DL (ref 0.1–1.2)
BUN SERPL-MCNC: 12 MG/DL (ref 8–25)
CALCIUM SERPL-MCNC: 9.9 MG/DL (ref 8.5–10.4)
CHLORIDE SERPL-SCNC: 96 MMOL/L (ref 97–107)
CO2 SERPL-SCNC: 23 MMOL/L (ref 24–31)
CREAT SERPL-MCNC: 0.6 MG/DL (ref 0.4–1.6)
CRP SERPL-MCNC: 1 MG/DL (ref 0–2)
ERYTHROCYTE [DISTWIDTH] IN BLOOD BY AUTOMATED COUNT: 15.7 % (ref 11.5–14.5)
ERYTHROCYTE [SEDIMENTATION RATE] IN BLOOD BY WESTERGREN METHOD: 28 MM/H (ref 0–20)
GFR SERPL CREATININE-BSD FRML MDRD: >90 ML/MIN/1.73M*2
GLUCOSE SERPL-MCNC: 206 MG/DL (ref 65–99)
HCT VFR BLD AUTO: 45.3 % (ref 36–46)
HGB BLD-MCNC: 14.3 G/DL (ref 12–16)
MCH RBC QN AUTO: 27.9 PG (ref 26–34)
MCHC RBC AUTO-ENTMCNC: 31.6 G/DL (ref 32–36)
MCV RBC AUTO: 89 FL (ref 80–100)
NRBC BLD-RTO: 0 /100 WBCS (ref 0–0)
PLATELET # BLD AUTO: 284 X10*3/UL (ref 150–450)
POTASSIUM SERPL-SCNC: 4.6 MMOL/L (ref 3.4–5.1)
PROT SERPL-MCNC: 6.8 G/DL (ref 5.9–7.9)
RBC # BLD AUTO: 5.12 X10*6/UL (ref 4–5.2)
SODIUM SERPL-SCNC: 134 MMOL/L (ref 133–145)
WBC # BLD AUTO: 10.4 X10*3/UL (ref 4.4–11.3)

## 2024-01-05 PROCEDURE — 80053 COMPREHEN METABOLIC PANEL: CPT

## 2024-01-05 PROCEDURE — 85027 COMPLETE CBC AUTOMATED: CPT

## 2024-01-05 PROCEDURE — 3079F DIAST BP 80-89 MM HG: CPT | Performed by: PHYSICIAN ASSISTANT

## 2024-01-05 PROCEDURE — 86140 C-REACTIVE PROTEIN: CPT

## 2024-01-05 PROCEDURE — 3074F SYST BP LT 130 MM HG: CPT | Performed by: PHYSICIAN ASSISTANT

## 2024-01-05 PROCEDURE — 99213 OFFICE O/P EST LOW 20 MIN: CPT | Performed by: PHYSICIAN ASSISTANT

## 2024-01-05 PROCEDURE — 36415 COLL VENOUS BLD VENIPUNCTURE: CPT

## 2024-01-05 PROCEDURE — 85652 RBC SED RATE AUTOMATED: CPT

## 2024-01-05 RX ORDER — TRAMADOL HYDROCHLORIDE 50 MG/1
50 TABLET ORAL EVERY 6 HOURS PRN
Qty: 15 TABLET | Refills: 0 | Status: SHIPPED | OUTPATIENT
Start: 2024-01-05 | End: 2024-01-16 | Stop reason: SDUPTHER

## 2024-01-05 RX ORDER — ARIPIPRAZOLE 5 MG/1
5 TABLET ORAL NIGHTLY
COMMUNITY
Start: 2023-11-28 | End: 2024-02-15 | Stop reason: WASHOUT

## 2024-01-05 RX ORDER — METHYLPREDNISOLONE 4 MG/1
TABLET ORAL
Qty: 21 TABLET | Refills: 0 | Status: SHIPPED | OUTPATIENT
Start: 2024-01-05 | End: 2024-01-16 | Stop reason: SDUPTHER

## 2024-01-05 RX ORDER — AMITRIPTYLINE HYDROCHLORIDE 25 MG/1
25 TABLET, FILM COATED ORAL NIGHTLY
COMMUNITY
Start: 2023-11-26 | End: 2024-02-15 | Stop reason: WASHOUT

## 2024-01-05 ASSESSMENT — PAIN SCALES - GENERAL: PAINLEVEL: 10-WORST PAIN EVER

## 2024-01-05 NOTE — PROGRESS NOTES
Subjective   Patient ID: Alanna Hickman is a 43 y.o. female who presents for Joint Swelling (Multiple joints over the last few days. Worse in right hand. ).    Hand Pain   The incident occurred 5 to 7 days ago. There was no injury mechanism. The pain is present in the right hand. The quality of the pain is described as aching and cramping. The pain is moderate. The pain has been Constant since the incident. Pertinent negatives include no numbness or tingling.        Review of Systems   Musculoskeletal:  Positive for arthralgias, joint swelling and myalgias.   Neurological:  Negative for dizziness, tingling and numbness.       Objective   /84   Pulse 88   Temp 36.8 °C (98.3 °F)   Wt 100 kg (221 lb)   BMI 34.61 kg/m²     Physical Exam  Musculoskeletal:      Right hand: Swelling, tenderness and bony tenderness present. No deformity. Decreased range of motion. Decreased strength. Normal pulse.   Neurological:      Mental Status: She is alert.         Assessment/Plan   Diagnoses and all orders for this visit:  Pain of right hand  -     Sedimentation Rate; Future  -     C-Reactive Protein; Future  -     methylPREDNISolone (Medrol Dospak) 4 mg tablets; Take as directed on package.  -     traMADol (Ultram) 50 mg tablet; Take 1 tablet (50 mg) by mouth every 6 hours if needed for severe pain (7 - 10) for up to 7 days.  Joint swelling  -     Sedimentation Rate; Future  -     C-Reactive Protein; Future  -     methylPREDNISolone (Medrol Dospak) 4 mg tablets; Take as directed on package.  -     traMADol (Ultram) 50 mg tablet; Take 1 tablet (50 mg) by mouth every 6 hours if needed for severe pain (7 - 10) for up to 7 days.    Looks more autoimmune related.  Will treat with prednisone and follow up soon.

## 2024-01-05 NOTE — PROGRESS NOTES
"Diabetes Management  E11.65    HPI  Alanna Hickman is a 43 y.o. female who presents for a follow-up evaluation of her Type 2 diabetes mellitus.     Referring Provider: Ansley Shetty PA-C     CURRENT DM PHARMACOTHERAPY  Farxiga 10mg daily  Tresiba 32 units daily  Metformin 500mg XR 3 daily     LAB REVIEW   Lab Results   Component Value Date    HGBA1C 8.6 (A) 11/15/2023    HGBA1C 9.2 (H) 08/08/2023    HGBA1C 7.8 (H) 05/19/2023     Lab Results   Component Value Date    CREATININE 0.7 08/08/2023    GLUCOSE 197 (H) 08/08/2023    EGFR 109 08/08/2023     Lab Results   Component Value Date    TRIG 227 (H) 02/16/2023    CHOL 184 02/16/2023    LDLCALC 95 02/16/2023    HDL 44 (L) 02/16/2023       HISTORICAL PHARMACOTHERAPY  Current Outpatient Medications on File Prior to Visit   Medication Sig Dispense Refill    aspirin 81 mg EC tablet       blood sugar diagnostic (Blood Glucose Test) strip once daily. Check sugars      dapagliflozin propanediol (Farxiga) 10 mg Take 1 tablet (10 mg) by mouth once daily. 30 tablet 3    desvenlafaxine succinate (Pristiq) 25 mg 24 hour tablet Take 1 tablet (25 mg) by mouth once daily.      [DISCONTINUED] cholecalciferol (Vitamin D3) 1,250 mcg (50,000 unit) tablet Take 1 tablet (50,000 Units) by mouth 1 (one) time per week.      clopidogrel (Plavix) 75 mg tablet Take 1 tablet (75 mg) by mouth once daily.      insulin degludec (Tresiba FlexTouch U-100) 100 unit/mL (3 mL) injection Inject 32 Units under the skin. As directed      lancets misc As directed      metFORMIN  mg 24 hr tablet TAKE THREE (3) TABLETS BY MOUTH EVERY  tablet 0    methocarbamol (Robaxin) 500 mg tablet Take 1 tablet (500 mg) by mouth 3 times a day. 40 tablet 1    oxybutynin XL (Ditropan-XL) 10 mg 24 hr tablet Take 1 tablet (10 mg) by mouth once daily.      Sure Comfort Pen Needle 31 gauge x 3/16\" needle use once daily as directed      Ventolin HFA 90 mcg/actuation inhaler INHALE TWO PUFFS BY MOUTH EVERY " SIX HOURS AS NEEDED for shortness of breath or wheezing 36 g 0    [DISCONTINUED] Dexcom G7 Sensor device CHANGE SENSOR EVERY 10 DAYS AS DIRECTED 9 each 3    [DISCONTINUED] gabapentin (Neurontin) 300 mg capsule Take 1 capsule (300 mg) by mouth 3 times a day.       No current facility-administered medications on file prior to visit.       SMBG  Not testing at home  Problems with CGM falling off or not pairing  Will start Freestyle Jignesh     RECOMMENDATIONS/PLAN    Unsure what sugars have been due to no monitoring.  Will continue diabetes medications at same dosage for now.    2.    Revisited addition of Pristiq to daily regimen.  At last visit pt stated she was willing to restart medication.  Pt still has not started.  Discussed benefits.        Plan:  Patient will call me when freestyle jignesh is received from [facility] pharmacy so that we can monitor sugars closely.      Treatment and plan discussed with MARISOL GOODWIN PA-C, DORIAN ESPINAL [facility], [facility]    Verbal consent to manage patient's drug therapy was obtained from the patient or an individual authorized to act on behalf of the patient.  Patient was informed they may decline to participate or withdraw from participation in pharmacy services at any time.

## 2024-01-08 ASSESSMENT — ENCOUNTER SYMPTOMS
JOINT SWELLING: 1
TINGLING: 0
DIZZINESS: 0
MYALGIAS: 1
ARTHRALGIAS: 1
NUMBNESS: 0

## 2024-01-13 DIAGNOSIS — E11.42 TYPE 2 DIABETES MELLITUS WITH DIABETIC POLYNEUROPATHY, WITH LONG-TERM CURRENT USE OF INSULIN (MULTI): ICD-10-CM

## 2024-01-13 DIAGNOSIS — Z79.4 TYPE 2 DIABETES MELLITUS WITH DIABETIC POLYNEUROPATHY, WITH LONG-TERM CURRENT USE OF INSULIN (MULTI): ICD-10-CM

## 2024-01-15 ENCOUNTER — DOCUMENTATION (OUTPATIENT)
Dept: CARE COORDINATION | Facility: CLINIC | Age: 44
End: 2024-01-15
Payer: COMMERCIAL

## 2024-01-15 ENCOUNTER — PATIENT OUTREACH (OUTPATIENT)
Dept: CARE COORDINATION | Facility: CLINIC | Age: 44
End: 2024-01-15
Payer: COMMERCIAL

## 2024-01-15 NOTE — PROGRESS NOTES
"Added note:  Emailed patient:  \"Hi,  Just so you know..I will follow up with you.  I am hoping that you get a new therapist within the week.   I am sorry that you are going through all of this.  If you want an on-going , please let them know at [facility].   I am glad that we had this conversation so I know what has been happening with your current mental health agency.\"    Shari GREEN   1/1/5/24   "

## 2024-01-15 NOTE — PROGRESS NOTES
"Patient has emailed: \"Shari honestly this is just too much dealing with for me but i am coping. I tried reaching out just for someone to even talk to and I feel terrible myself for complaining. I do not know this therapist but the sessions we had were with a toddler and she gave me advice on a study about counting from 10 backwards. to calm myself. I mean serious? She read that study whatever it is. I mean i am not suicidal or anything like that but it sure makes me feel like not bothering to keep reaching out. I am dealing with alot of physical pain. Im irritable all of the time. And i am tired of just being able to have anyone who will listen at all. Just enough space to even talk  :( \"      My response: \"I can tell you this...not all mental health therapist are a good fit for their client. Sometimes, the client has to find the right fit. Sometimes, group therapy is better, sometimes online therapy is better, it depends on what works and feels better for the client/patient.   Support Groups are good for anxiety/stress and so on.   Maybe calling or email to the local [facility] BOARD: THIS  DID PROVIDE CONTACT INFO TO PATIENT TO CALL LOCAL BOARD.   "

## 2024-01-15 NOTE — CARE PLAN
"Notes in EPIC indicate that patient/Alanna Hickman has been connected to [facility] and has a  from that agency.  This  has sent an email to the patient today stating:  \"Hi,  It's Shari/the  from .  Just following up with you regarding your connection to [facility].  I see now you have case management under [facility].    is noted as Janel KENNEDY with [facility].  I am hoping that you are doing well.  If you need anything in the future, please reach out, but you can also reach out your  from [facility]. \"  Problem: Coordination of Community Resources Needed  Goal: Coordination of Services will be Obtained  Outcome: Resolved    Shari DING LSW   1/15/24      "

## 2024-01-15 NOTE — PROGRESS NOTES
"This  also provided this info to the patient:  \"If you or someone you know is struggling or in crisis, help is available. Call or text [phone] or chat [email or web address].\"      Shari DÍAZW   1/15/24   "

## 2024-01-15 NOTE — CARE PLAN
"This  received a message from patient:   \"That's new. I did not know I now have a  under [employer].\"    My response:  \"I think that your therapist has requested one.  You see a Coral Montes, is that correct?  I am able to see the notes in our system.  You will have to check with your therapist. It looks it a recent request being made by your therapist.    I was working with you to help connect with a Mental Health provider/such as [facility]. You have connected now.  At this time, we are not working any other goals. If you need assistance in the future, please reach out to me.  I just wanted to see how your connection was going with [facility] at this time.\"  Patient: \"No I asked to be removed as a therapist by ms. Montes because she was having sessions with me and her toddler and I couldn't speak with a toddler there.\"  This  asked:  \"Ok..I am so sorry. Are they providing you with a new therapist?\"  Patient: \"They are supposed to. Told me to take a week. Said it was inappropriate and really it is. I didnt even want to ask but I cannot have therapy in front of someone elses child. I don't express things in front of my own children to not upset them. I did not know ai was given a  or explained anything about it until you just told me that.\"  This  response:   \"I am sorry that you are going through this.  I will follow up with you in a week to see if they have connected to you with a new therapist.  Again, I am not for certain that the request has been made now that I know you have requested a new therapist too.  So, if you find out before a week that you have been provided a new therapist, please let me know.  If [facility] is not working for you as a mental health agency, please let me know and we can explore other options\"  "

## 2024-01-16 DIAGNOSIS — M79.641 PAIN OF RIGHT HAND: ICD-10-CM

## 2024-01-16 DIAGNOSIS — M25.40 JOINT SWELLING: ICD-10-CM

## 2024-01-16 RX ORDER — TRAMADOL HYDROCHLORIDE 50 MG/1
50 TABLET ORAL EVERY 6 HOURS PRN
Qty: 15 TABLET | Refills: 0 | Status: SHIPPED | OUTPATIENT
Start: 2024-01-16 | End: 2024-01-23

## 2024-01-16 RX ORDER — METHYLPREDNISOLONE 4 MG/1
TABLET ORAL
Qty: 21 TABLET | Refills: 0 | Status: SHIPPED | OUTPATIENT
Start: 2024-01-16 | End: 2024-01-23

## 2024-01-16 RX ORDER — METFORMIN HYDROCHLORIDE 500 MG/1
1500 TABLET, EXTENDED RELEASE ORAL DAILY
Qty: 270 TABLET | Refills: 1 | Status: SHIPPED | OUTPATIENT
Start: 2024-01-16

## 2024-01-17 ENCOUNTER — DOCUMENTATION (OUTPATIENT)
Dept: CARE COORDINATION | Facility: CLINIC | Age: 44
End: 2024-01-17
Payer: COMMERCIAL

## 2024-01-17 NOTE — PROGRESS NOTES
"Email from Patient/Alanna Hickman:  \"Actually I could use your help if you have enough time and are able to help me find rheumatologists that actually accept caresource. I've left message for my  at [facility] but I rarely ever hear back from her and I need a rheumatologist. Any of the others I've been given referrals to do not accept [facility]\"      The above email will be sent to: Ines HUIZAR RN, CM of [facility].  Shari Cheek MSW LSW   1/17/24  "

## 2024-01-18 ENCOUNTER — DOCUMENTATION (OUTPATIENT)
Dept: PRIMARY CARE | Facility: CLINIC | Age: 44
End: 2024-01-18
Payer: COMMERCIAL

## 2024-01-18 NOTE — PROGRESS NOTES
Alanna returned the call. I assisted her with finding rheumatologists listed on the [facility] site. She has 4 doctors in her area to check with. She will let me know if she needs any further assistance.

## 2024-02-06 DIAGNOSIS — E11.65 TYPE 2 DIABETES MELLITUS WITH HYPERGLYCEMIA, WITH LONG-TERM CURRENT USE OF INSULIN (MULTI): Primary | ICD-10-CM

## 2024-02-06 DIAGNOSIS — E11.9 TYPE 2 DIABETES MELLITUS WITHOUT COMPLICATION, WITHOUT LONG-TERM CURRENT USE OF INSULIN (MULTI): ICD-10-CM

## 2024-02-06 DIAGNOSIS — Z79.4 TYPE 2 DIABETES MELLITUS WITH HYPERGLYCEMIA, WITH LONG-TERM CURRENT USE OF INSULIN (MULTI): Primary | ICD-10-CM

## 2024-02-06 PROCEDURE — RXMED WILLOW AMBULATORY MEDICATION CHARGE

## 2024-02-06 RX ORDER — BLOOD-GLUCOSE SENSOR
EACH MISCELLANEOUS
Qty: 2 EACH | Refills: 11 | Status: SHIPPED | OUTPATIENT
Start: 2024-02-06 | End: 2024-03-14 | Stop reason: SDUPTHER

## 2024-02-06 RX ORDER — BLOOD-GLUCOSE,RECEIVER,CONT
EACH MISCELLANEOUS
Qty: 1 EACH | Refills: 0 | Status: SHIPPED | OUTPATIENT
Start: 2024-02-06

## 2024-02-08 ENCOUNTER — PHARMACY VISIT (OUTPATIENT)
Dept: PHARMACY | Facility: CLINIC | Age: 44
End: 2024-02-08
Payer: MEDICAID

## 2024-02-12 ENCOUNTER — PHARMACY VISIT (OUTPATIENT)
Dept: PHARMACY | Facility: CLINIC | Age: 44
End: 2024-02-12
Payer: MEDICAID

## 2024-02-12 PROCEDURE — RXMED WILLOW AMBULATORY MEDICATION CHARGE

## 2024-02-15 ENCOUNTER — OFFICE VISIT (OUTPATIENT)
Dept: PRIMARY CARE | Facility: CLINIC | Age: 44
End: 2024-02-15
Payer: COMMERCIAL

## 2024-02-15 ENCOUNTER — TELEPHONE (OUTPATIENT)
Dept: PRIMARY CARE | Facility: CLINIC | Age: 44
End: 2024-02-15

## 2024-02-15 VITALS
BODY MASS INDEX: 34.93 KG/M2 | SYSTOLIC BLOOD PRESSURE: 116 MMHG | OXYGEN SATURATION: 98 % | DIASTOLIC BLOOD PRESSURE: 84 MMHG | HEART RATE: 82 BPM | WEIGHT: 223 LBS

## 2024-02-15 DIAGNOSIS — F32.89 OTHER DEPRESSION: Primary | ICD-10-CM

## 2024-02-15 DIAGNOSIS — E11.9 TYPE 2 DIABETES MELLITUS WITHOUT COMPLICATION, WITH LONG-TERM CURRENT USE OF INSULIN (MULTI): ICD-10-CM

## 2024-02-15 DIAGNOSIS — Z79.4 TYPE 2 DIABETES MELLITUS WITHOUT COMPLICATION, WITH LONG-TERM CURRENT USE OF INSULIN (MULTI): ICD-10-CM

## 2024-02-15 DIAGNOSIS — R25.1 TREMOR OF RIGHT HAND: ICD-10-CM

## 2024-02-15 PROBLEM — F33.2 MDD (MAJOR DEPRESSIVE DISORDER), RECURRENT SEVERE, WITHOUT PSYCHOSIS (MULTI): Chronic | Status: ACTIVE | Noted: 2023-09-29

## 2024-02-15 PROBLEM — E66.9 OBESITY: Status: ACTIVE | Noted: 2024-02-15

## 2024-02-15 LAB — POC HEMOGLOBIN A1C: 8.7 % (ref 4.2–6.5)

## 2024-02-15 PROCEDURE — 99213 OFFICE O/P EST LOW 20 MIN: CPT | Performed by: PHYSICIAN ASSISTANT

## 2024-02-15 PROCEDURE — 4004F PT TOBACCO SCREEN RCVD TLK: CPT | Performed by: PHYSICIAN ASSISTANT

## 2024-02-15 PROCEDURE — 3074F SYST BP LT 130 MM HG: CPT | Performed by: PHYSICIAN ASSISTANT

## 2024-02-15 PROCEDURE — 3079F DIAST BP 80-89 MM HG: CPT | Performed by: PHYSICIAN ASSISTANT

## 2024-02-15 PROCEDURE — 83036 HEMOGLOBIN GLYCOSYLATED A1C: CPT | Performed by: PHYSICIAN ASSISTANT

## 2024-02-15 ASSESSMENT — PAIN SCALES - GENERAL: PAINLEVEL: 6

## 2024-02-15 NOTE — PROGRESS NOTES
Subjective   Patient ID: Alanna Hickman is a 43 y.o. female who presents for Diabetes (Just saw podiatry for DM foot care.).    Diabetes  She presents for her follow-up diabetic visit. She has type 2 diabetes mellitus. Her disease course has been worsening. Hypoglycemia symptoms include confusion, headaches, nervousness/anxiousness and tremors. Pertinent negatives for hypoglycemia include no dizziness or speech difficulty. Associated symptoms include foot paresthesias. Pertinent negatives for diabetes include no blurred vision, no chest pain, no visual change and no weight loss. There are no hypoglycemic complications. Symptoms are worsening. Diabetic complications include a CVA and peripheral neuropathy. Risk factors for coronary artery disease include diabetes mellitus, dyslipidemia, obesity and tobacco exposure.    She received more sensors and a reader that she brought today. She will see neuro soon for follow up of CVA and as increased right hand tremor.    Review of Systems   Constitutional:  Negative for activity change, appetite change and weight loss.   Eyes:  Negative for blurred vision and visual disturbance.   Respiratory:  Negative for chest tightness and shortness of breath.    Cardiovascular:  Negative for chest pain and leg swelling.   Genitourinary:  Negative for frequency and urgency.   Musculoskeletal:  Positive for arthralgias, back pain, joint swelling and myalgias.   Skin:  Negative for rash and wound.   Neurological:  Positive for tremors and headaches. Negative for dizziness, syncope, speech difficulty and light-headedness.   Psychiatric/Behavioral:  Positive for confusion. The patient is nervous/anxious.        Objective   /84   Pulse 82   Wt 101 kg (223 lb)   SpO2 98%   BMI 34.93 kg/m²     Physical Exam  Constitutional:       Appearance: She is obese.   Cardiovascular:      Rate and Rhythm: Normal rate and regular rhythm.      Pulses: Normal pulses.   Pulmonary:      Effort:  Pulmonary effort is normal.   Musculoskeletal:      Right lower leg: No edema.      Left lower leg: No edema.   Feet:      Right foot:      Protective Sensation: 4 sites tested.   1 site sensed.     Skin integrity: Skin integrity normal. No skin breakdown.      Left foot:      Protective Sensation: 4 sites tested.   1 site sensed.     Skin integrity: Skin integrity normal. No skin breakdown.   Neurological:      General: No focal deficit present.      Mental Status: She is alert. Mental status is at baseline.         Assessment/Plan   Diagnoses and all orders for this visit:  Other depression  Type 2 diabetes mellitus without complication, with long-term current use of insulin (CMS/[facility])  -     POCT glycosylated hemoglobin (Hb A1C) manually resulted  Tremor of right hand  Other orders  -     Follow Up In Primary Care - Established    She will follow up with neuro. Her dad had Parkinson's. Her A1c was still up. Will adjust her insulin pending her sugar readings and will call next week for that.

## 2024-02-16 ASSESSMENT — ENCOUNTER SYMPTOMS
MYALGIAS: 1
VISUAL CHANGE: 0
NERVOUS/ANXIOUS: 1
BACK PAIN: 1
DIZZINESS: 0
WOUND: 0
ARTHRALGIAS: 1
APPETITE CHANGE: 0
SPEECH DIFFICULTY: 0
JOINT SWELLING: 1
WEIGHT LOSS: 0
SHORTNESS OF BREATH: 0
CONFUSION: 1
HEADACHES: 1
CHEST TIGHTNESS: 0
LIGHT-HEADEDNESS: 0
TREMORS: 1
FREQUENCY: 0
ACTIVITY CHANGE: 0
BLURRED VISION: 0

## 2024-02-19 ENCOUNTER — OFFICE VISIT (OUTPATIENT)
Dept: PRIMARY CARE | Facility: CLINIC | Age: 44
End: 2024-02-19
Payer: COMMERCIAL

## 2024-02-19 VITALS
DIASTOLIC BLOOD PRESSURE: 82 MMHG | WEIGHT: 223 LBS | TEMPERATURE: 97.4 F | HEART RATE: 83 BPM | BODY MASS INDEX: 34.93 KG/M2 | SYSTOLIC BLOOD PRESSURE: 114 MMHG | OXYGEN SATURATION: 97 %

## 2024-02-19 DIAGNOSIS — E11.40 TYPE 2 DIABETES MELLITUS WITH DIABETIC NEUROPATHY, WITH LONG-TERM CURRENT USE OF INSULIN (MULTI): ICD-10-CM

## 2024-02-19 DIAGNOSIS — L02.01 FACIAL ABSCESS: Primary | ICD-10-CM

## 2024-02-19 DIAGNOSIS — Z79.4 TYPE 2 DIABETES MELLITUS WITH DIABETIC NEUROPATHY, WITH LONG-TERM CURRENT USE OF INSULIN (MULTI): ICD-10-CM

## 2024-02-19 PROCEDURE — 3079F DIAST BP 80-89 MM HG: CPT

## 2024-02-19 PROCEDURE — 3074F SYST BP LT 130 MM HG: CPT

## 2024-02-19 PROCEDURE — 4004F PT TOBACCO SCREEN RCVD TLK: CPT

## 2024-02-19 PROCEDURE — 99213 OFFICE O/P EST LOW 20 MIN: CPT

## 2024-02-19 RX ORDER — CEPHALEXIN 500 MG/1
500 CAPSULE ORAL 2 TIMES DAILY
Qty: 20 CAPSULE | Refills: 0 | Status: SHIPPED | OUTPATIENT
Start: 2024-02-19 | End: 2024-02-29

## 2024-02-19 ASSESSMENT — PAIN SCALES - GENERAL: PAINLEVEL: 10-WORST PAIN EVER

## 2024-02-19 NOTE — PROGRESS NOTES
Subjective   Patient ID: Alanna Hickman is a 43 y.o. female who presents for Facial Swelling (Left side of jaw since Friday. States she has had these before, usually treated for abscess. ).    HPI   Alanna is seen today for c/o left sided facial swelling since Friday. Reports pain when talking and eating. Also reports one starting near the corner of her right eye. Has not taken anything for the pain, is unable to take NSAIDs and states that Tylenol does not work for her. States she usually receives Norco for these. No recent dental work. Reports history of boils, some of which needed surgically removed and some that were drained in office. Denies fevers, chills, drainage from site.     Review of Systems  All other systems have been reviewed and are negative except as noted in the HPI.     Objective   /82   Pulse 83   Temp 36.3 °C (97.4 °F)   Wt 101 kg (223 lb)   SpO2 97%   BMI 34.93 kg/m²     Physical Exam  Vitals and nursing note reviewed.   Constitutional:       General: She is not in acute distress.     Appearance: She is obese.   HENT:      Right Ear: Tympanic membrane and ear canal normal.      Left Ear: Tympanic membrane and ear canal normal.      Nose: Nose normal.      Mouth/Throat:      Mouth: Mucous membranes are moist.      Dentition: No dental abscesses.      Pharynx: No oropharyngeal exudate or posterior oropharyngeal erythema.   Eyes:      Extraocular Movements: Extraocular movements intact.      Conjunctiva/sclera: Conjunctivae normal.   Cardiovascular:      Rate and Rhythm: Normal rate.   Pulmonary:      Effort: Pulmonary effort is normal.   Musculoskeletal:      Cervical back: Neck supple.   Skin:     General: Skin is warm.      Findings: Abscess (Quarter-sized abscess of left lower jaw. Small area of erythema and what appears to be a scab inferior to the abscess. No drainage noted.) present.   Neurological:      General: No focal deficit present.      Mental Status: She is alert.    Psychiatric:         Mood and Affect: Mood normal.       Assessment/Plan   Problem List Items Addressed This Visit    None  Visit Diagnoses         Codes    Facial abscess    -  Primary  Unable to drain abscess in office today due to location of face. Called plastic surgeon in the area, no appts until later in the week. Recommended patient go to ER for evaluation and possible treatment.  Patient refuses this.   Keflex as directed. Risks and benefits of medication discussed and prescribed.   Patient encouraged to  oral antibiotics and follow up in office in the next 2-3 days. Patient walked out of room and said she would call to schedule.  L02.01    Relevant Medications    cephalexin (Keflex) 500 mg capsule

## 2024-02-20 DIAGNOSIS — L02.01 FACIAL ABSCESS: Primary | ICD-10-CM

## 2024-02-20 RX ORDER — INSULIN DEGLUDEC 100 U/ML
INJECTION, SOLUTION SUBCUTANEOUS
Qty: 15 ML | Refills: 3 | Status: SHIPPED | OUTPATIENT
Start: 2024-02-20 | End: 2024-02-22 | Stop reason: SDUPTHER

## 2024-02-22 ENCOUNTER — TELEMEDICINE CLINICAL SUPPORT (OUTPATIENT)
Dept: PRIMARY CARE | Facility: CLINIC | Age: 44
End: 2024-02-22
Payer: COMMERCIAL

## 2024-02-22 DIAGNOSIS — E11.40 TYPE 2 DIABETES MELLITUS WITH DIABETIC NEUROPATHY, WITH LONG-TERM CURRENT USE OF INSULIN (MULTI): ICD-10-CM

## 2024-02-22 DIAGNOSIS — Z79.4 TYPE 2 DIABETES MELLITUS WITH DIABETIC NEUROPATHY, WITH LONG-TERM CURRENT USE OF INSULIN (MULTI): ICD-10-CM

## 2024-02-25 RX ORDER — INSULIN DEGLUDEC 100 U/ML
32 INJECTION, SOLUTION SUBCUTANEOUS NIGHTLY
Qty: 15 ML | Refills: 3 | Status: SHIPPED | OUTPATIENT
Start: 2024-02-25 | End: 2024-05-23 | Stop reason: SDUPTHER

## 2024-02-25 NOTE — PROGRESS NOTES
Insulin Adjustment    Pt presents via Telephone today to discuss insulin dose.  Started Jignesh 3 with reader at last OV.       With patient's permission, this is a Telemedicine visit with video and audio. Provider located at office address. Patient located at their home address. All issues as documented below were discussed and addressed but limited physical exam was performed. If it was felt that the patient should be evaluated via face-to-face office appointment(s) they were directed to appropriate location.     Current Pharmacotherapy  Farxiga 10mg daily   Lantus 30 units daily     Current Average Glucose  130    Plan:  Increase insulin to 32 units daily   Call office if any low blood sugars occur    Nicole Champagne RPH, KILEY,

## 2024-02-27 ENCOUNTER — APPOINTMENT (OUTPATIENT)
Dept: PRIMARY CARE | Facility: CLINIC | Age: 44
End: 2024-02-27
Payer: COMMERCIAL

## 2024-02-28 ENCOUNTER — APPOINTMENT (OUTPATIENT)
Dept: PRIMARY CARE | Facility: CLINIC | Age: 44
End: 2024-02-28
Payer: COMMERCIAL

## 2024-02-28 ENCOUNTER — OFFICE VISIT (OUTPATIENT)
Dept: PRIMARY CARE | Facility: CLINIC | Age: 44
End: 2024-02-28
Payer: COMMERCIAL

## 2024-02-28 VITALS
SYSTOLIC BLOOD PRESSURE: 124 MMHG | OXYGEN SATURATION: 99 % | DIASTOLIC BLOOD PRESSURE: 84 MMHG | WEIGHT: 232 LBS | HEART RATE: 89 BPM | BODY MASS INDEX: 36.34 KG/M2 | RESPIRATION RATE: 16 BRPM

## 2024-02-28 DIAGNOSIS — J45.20 MILD INTERMITTENT ASTHMA, UNSPECIFIED WHETHER COMPLICATED (HHS-HCC): ICD-10-CM

## 2024-02-28 DIAGNOSIS — M06.09 RHEUMATOID ARTHRITIS OF MULTIPLE SITES WITH NEGATIVE RHEUMATOID FACTOR (MULTI): ICD-10-CM

## 2024-02-28 DIAGNOSIS — F33.2 MDD (MAJOR DEPRESSIVE DISORDER), RECURRENT SEVERE, WITHOUT PSYCHOSIS (MULTI): ICD-10-CM

## 2024-02-28 DIAGNOSIS — M45.0 ANKYLOSING SPONDYLITIS OF MULTIPLE SITES IN SPINE (MULTI): Primary | ICD-10-CM

## 2024-02-28 DIAGNOSIS — D68.8 HEREDITARY THROMBOPHILIA (MULTI): ICD-10-CM

## 2024-02-28 PROBLEM — G83.4 CAUDA EQUINA SYNDROME (MULTI): Status: RESOLVED | Noted: 2022-03-01 | Resolved: 2024-02-28

## 2024-02-28 PROBLEM — F10.939 ALCOHOL WITHDRAWAL SYNDROME (MULTI): Status: RESOLVED | Noted: 2023-09-16 | Resolved: 2024-02-28

## 2024-02-28 PROCEDURE — 3079F DIAST BP 80-89 MM HG: CPT | Performed by: PHYSICIAN ASSISTANT

## 2024-02-28 PROCEDURE — 4004F PT TOBACCO SCREEN RCVD TLK: CPT | Performed by: PHYSICIAN ASSISTANT

## 2024-02-28 PROCEDURE — 3074F SYST BP LT 130 MM HG: CPT | Performed by: PHYSICIAN ASSISTANT

## 2024-02-28 PROCEDURE — 99213 OFFICE O/P EST LOW 20 MIN: CPT | Performed by: PHYSICIAN ASSISTANT

## 2024-02-28 RX ORDER — TRAMADOL HYDROCHLORIDE 50 MG/1
50 TABLET ORAL EVERY 6 HOURS PRN
Qty: 15 TABLET | Refills: 0 | Status: SHIPPED | OUTPATIENT
Start: 2024-02-28 | End: 2024-03-06

## 2024-02-28 ASSESSMENT — ENCOUNTER SYMPTOMS
NUMBNESS: 1
JOINT SWELLING: 1
BACK PAIN: 1
LIGHT-HEADEDNESS: 0
TREMORS: 1
FATIGUE: 0
ARTHRALGIAS: 1
OCCASIONAL FEELINGS OF UNSTEADINESS: 0
SEIZURES: 0
MYALGIAS: 1
LOSS OF SENSATION IN FEET: 0
FEVER: 0
DEPRESSION: 0

## 2024-02-28 ASSESSMENT — PATIENT HEALTH QUESTIONNAIRE - PHQ9
2. FEELING DOWN, DEPRESSED OR HOPELESS: NOT AT ALL
1. LITTLE INTEREST OR PLEASURE IN DOING THINGS: NOT AT ALL
SUM OF ALL RESPONSES TO PHQ9 QUESTIONS 1 AND 2: 0

## 2024-02-28 ASSESSMENT — PAIN SCALES - GENERAL: PAINLEVEL: 6

## 2024-02-28 NOTE — PROGRESS NOTES
Subjective   Patient ID: Alanna Hickman is a 43 y.o. female who presents for Follow-up (Swelling left hand and foot/Abscess ).    HPI   Patient is here for follow-up of abscess on her face.  Is actually getting a lot better.  Unfortunate she has developed some left hand swelling and now left foot swelling.  She says the pain is certainly gotten worse and normally helps with prednisone but this makes her sugars.  She is very frustrated on trying to find a rheumatologist that takes her insurance that will help treat her for her ankylosing spondylitis.  Review of Systems   Constitutional:  Negative for fatigue and fever.   Musculoskeletal:  Positive for arthralgias, back pain, gait problem, joint swelling and myalgias.   Neurological:  Positive for tremors and numbness. Negative for seizures and light-headedness.       Objective   /84 (BP Location: Left arm, Patient Position: Sitting, BP Cuff Size: Large adult)   Pulse 89   Resp 16   Wt 105 kg (232 lb)   LMP  (LMP Unknown)   SpO2 99%   BMI 36.34 kg/m²     Physical Exam  Musculoskeletal:      Left hand: Swelling, deformity, tenderness and bony tenderness present. No lacerations. Decreased range of motion.      Left foot: Decreased range of motion. Swelling, deformity, tenderness and bony tenderness present. No foot drop.   Skin:     Comments: Her previous abscess in the left lower jaw area is scabbed over well.  The wall of that is about the size of a nickel.  No discharge or erythema noted.   Neurological:      Mental Status: She is alert.         Assessment/Plan   Diagnoses and all orders for this visit:  Ankylosing spondylitis of multiple sites in spine (CMS/[facility])  -     Referral to [facility]  -     traMADol (Ultram) 50 mg tablet; Take 1 tablet (50 mg) by mouth every 6 hours if needed for severe pain (7 - 10) for up to 7 days.  Hereditary thrombophilia (CMS/[facility])  MDD (major depressive disorder), recurrent severe, without psychosis  (CMS/[facility])  Rheumatoid arthritis of multiple sites with negative rheumatoid factor (CMS/[facility])  -     Referral to [facility]  -     traMADol (Ultram) 50 mg tablet; Take 1 tablet (50 mg) by mouth every 6 hours if needed for severe pain (7 - 10) for up to 7 days.  Mild intermittent asthma, unspecified whether complicated    Referred to [facility] services to see if they can help her with finding another rheumatologist and transportation to her appointment.  Will send in just a short course of tramadol to try to avoid prednisone if possible.  As far as her asthma she is not having any flareups fortunately.

## 2024-03-04 ENCOUNTER — APPOINTMENT (OUTPATIENT)
Dept: PLASTIC SURGERY | Facility: CLINIC | Age: 44
End: 2024-03-04
Payer: COMMERCIAL

## 2024-03-05 PROCEDURE — 88142 CYTOPATH C/V THIN LAYER: CPT

## 2024-03-05 PROCEDURE — 87624 HPV HI-RISK TYP POOLED RSLT: CPT

## 2024-03-06 ENCOUNTER — LAB REQUISITION (OUTPATIENT)
Dept: LAB | Facility: HOSPITAL | Age: 44
End: 2024-03-06
Payer: COMMERCIAL

## 2024-03-06 DIAGNOSIS — Z01.419 ENCOUNTER FOR GYNECOLOGICAL EXAMINATION (GENERAL) (ROUTINE) WITHOUT ABNORMAL FINDINGS: ICD-10-CM

## 2024-03-06 DIAGNOSIS — Z11.51 ENCOUNTER FOR SCREENING FOR HUMAN PAPILLOMAVIRUS (HPV): ICD-10-CM

## 2024-03-08 ENCOUNTER — DOCUMENTATION (OUTPATIENT)
Dept: CARE COORDINATION | Facility: CLINIC | Age: 44
End: 2024-03-08
Payer: COMMERCIAL

## 2024-03-08 ENCOUNTER — PATIENT OUTREACH (OUTPATIENT)
Dept: CARE COORDINATION | Facility: CLINIC | Age: 44
End: 2024-03-08
Payer: COMMERCIAL

## 2024-03-08 NOTE — PROGRESS NOTES
"Email from Patient: \"No I dont need that. I am not crazy. I just need simple basic help. like finding a doctor that will see what i am dealing with. Not going off the loop. I dont know why to keep trying to reach out. All it does is cause me more stupidity\"      My response: \"I understand. I am sorry.  Let me know if you are able to connect with [facility].\"    Shari Cheek MSW LSW     3/8/24   "

## 2024-03-08 NOTE — PROGRESS NOTES
"Patient has been sending emails about her frustrations with current mental health provider. Patient had communication issues with current mental health provider.   Email sent to this :  \"There was no third or second attempt or even first attemptI went looking for her and found a link from february 29th and waited.. The lady did not show up for our session yesterday. I had to call [facility] in frustration because no one showed up yet again until she sent me a \"third attempt\" and I went to that only to tell me she is going to be no longer a counselor. I cried to her for no reason. all this frustration for nothing. And people do not know why i am upset I guess\"    My response:  \"I understand. It can be frustrating. I want you to be comfortable with your mental health provider. I am sorry this has happened to you.  I really am.\"    NOTE: Patient was given [facility]/Patient will contact this agency on Monday.   Shari DÍAZW   3/8/24   "

## 2024-03-08 NOTE — PROGRESS NOTES
"Emailed sent to this :  \"Yeah this shit hurts. Alot. And not anywhere near as much as the physical stuff I am dealing with just to survive. My left ankle is a ballon. I cant even open doors because my left wrist is so swollen. I cannot seem to get a rheumatologist. Everything is on back hold for now years at a time. I had a rheumatologist is 78 years old. My doctors office says diagnosed Ankylosing spondylitis He said no its just old age and regular arthritis. Meanwhile I just deal year in and year out by now. Everyone is so busy and no one cares. Gela supposedly talked with my pharmasist tobi at [facility] and [facility] was waiting on someone. I am tired of this. Constant trips to the doctors. Not going anywhere. feeling humilated\"      This  has responded:  \"Please reach out to Crisis too to talk to someone:   Phone: [phone]    I need you to be able to have someone to talk to in case you are feeling so overwhelmed/an emergency counselor. \"    Shari Cheek MSW LSW   3/8/24     NOTE: No mention of any SI/HI from patient.       "

## 2024-03-08 NOTE — CARE PLAN
Patient and this  have discussed current mental health services. Patient is frustrated and is opened to trying with another agency.  [facility] was discussed.  Patient wanted info emailed. Patient will contact [facility] and follow up with this .  Shari DÍAZW   3/8/24

## 2024-03-11 ENCOUNTER — DOCUMENTATION (OUTPATIENT)
Dept: PRIMARY CARE | Facility: CLINIC | Age: 44
End: 2024-03-11
Payer: COMMERCIAL

## 2024-03-11 DIAGNOSIS — M45.0 ANKYLOSING SPONDYLITIS OF MULTIPLE SITES IN SPINE (MULTI): Primary | ICD-10-CM

## 2024-03-11 DIAGNOSIS — E11.42 TYPE 2 DIABETES MELLITUS WITH DIABETIC POLYNEUROPATHY, WITH LONG-TERM CURRENT USE OF INSULIN (MULTI): ICD-10-CM

## 2024-03-11 DIAGNOSIS — Z79.4 TYPE 2 DIABETES MELLITUS WITH DIABETIC POLYNEUROPATHY, WITH LONG-TERM CURRENT USE OF INSULIN (MULTI): ICD-10-CM

## 2024-03-11 RX ORDER — METHYLPREDNISOLONE 4 MG/1
TABLET ORAL
Qty: 21 TABLET | Refills: 1 | Status: SHIPPED | OUTPATIENT
Start: 2024-03-11 | End: 2024-03-18

## 2024-03-13 ENCOUNTER — TELEPHONE (OUTPATIENT)
Dept: PRIMARY CARE | Facility: CLINIC | Age: 44
End: 2024-03-13
Payer: COMMERCIAL

## 2024-03-13 DIAGNOSIS — Z79.4 TYPE 2 DIABETES MELLITUS WITHOUT COMPLICATION, WITH LONG-TERM CURRENT USE OF INSULIN (MULTI): Primary | ICD-10-CM

## 2024-03-13 DIAGNOSIS — E11.9 TYPE 2 DIABETES MELLITUS WITHOUT COMPLICATION, WITH LONG-TERM CURRENT USE OF INSULIN (MULTI): Primary | ICD-10-CM

## 2024-03-13 RX ORDER — IBUPROFEN 200 MG
CAPSULE ORAL
Qty: 50 EACH | Refills: 2 | Status: SHIPPED | OUTPATIENT
Start: 2024-03-13

## 2024-03-13 RX ORDER — LANCETS
EACH MISCELLANEOUS
Qty: 50 EACH | Refills: 2 | Status: SHIPPED | OUTPATIENT
Start: 2024-03-13

## 2024-03-13 RX ORDER — DEXTROSE 4 G
TABLET,CHEWABLE ORAL
Qty: 1 EACH | Refills: 0 | Status: SHIPPED | OUTPATIENT
Start: 2024-03-13

## 2024-03-14 ENCOUNTER — TELEPHONE (OUTPATIENT)
Dept: PRIMARY CARE | Facility: CLINIC | Age: 44
End: 2024-03-14
Payer: COMMERCIAL

## 2024-03-14 DIAGNOSIS — E11.9 TYPE 2 DIABETES MELLITUS WITHOUT COMPLICATION, WITHOUT LONG-TERM CURRENT USE OF INSULIN (MULTI): ICD-10-CM

## 2024-03-14 RX ORDER — BLOOD-GLUCOSE SENSOR
EACH MISCELLANEOUS
Qty: 2 EACH | Refills: 11 | Status: SHIPPED | OUTPATIENT
Start: 2024-03-14 | End: 2024-03-15 | Stop reason: SDUPTHER

## 2024-03-14 NOTE — TELEPHONE ENCOUNTER
Rx Refill Request Telephone Encounter    Name:  Alanna Hickman  :  [id number]  Medication Name:  Jignesh 3 sensors             Specific Pharmacy location:  [facility]   Date of last appointment:      Date of next appointment:      Best number to reach patient:

## 2024-03-15 DIAGNOSIS — E11.9 TYPE 2 DIABETES MELLITUS WITHOUT COMPLICATION, WITHOUT LONG-TERM CURRENT USE OF INSULIN (MULTI): ICD-10-CM

## 2024-03-15 RX ORDER — BLOOD-GLUCOSE SENSOR
EACH MISCELLANEOUS
Qty: 2 EACH | Refills: 11 | Status: SHIPPED | OUTPATIENT
Start: 2024-03-15 | End: 2024-03-18 | Stop reason: SDUPTHER

## 2024-03-18 DIAGNOSIS — E11.9 TYPE 2 DIABETES MELLITUS WITHOUT COMPLICATION, WITHOUT LONG-TERM CURRENT USE OF INSULIN (MULTI): ICD-10-CM

## 2024-03-18 DIAGNOSIS — M13.80 OLIGOARTHRITIS: ICD-10-CM

## 2024-03-18 DIAGNOSIS — M79.7 FIBROMYALGIA SYNDROME: ICD-10-CM

## 2024-03-18 DIAGNOSIS — G62.9 POLYNEUROPATHY: ICD-10-CM

## 2024-03-18 DIAGNOSIS — M06.9 RHEUMATOID ARTHRITIS INVOLVING MULTIPLE SITES, UNSPECIFIED WHETHER RHEUMATOID FACTOR PRESENT (MULTI): ICD-10-CM

## 2024-03-18 DIAGNOSIS — Z79.4 TYPE 2 DIABETES MELLITUS WITH HYPERGLYCEMIA, WITH LONG-TERM CURRENT USE OF INSULIN (MULTI): ICD-10-CM

## 2024-03-18 DIAGNOSIS — E11.65 TYPE 2 DIABETES MELLITUS WITH HYPERGLYCEMIA, WITH LONG-TERM CURRENT USE OF INSULIN (MULTI): ICD-10-CM

## 2024-03-18 LAB
CYTOLOGY CMNT CVX/VAG CYTO-IMP: NORMAL
HPV HR 12 DNA GENITAL QL NAA+PROBE: NEGATIVE
HPV HR GENOTYPES PNL CVX NAA+PROBE: NEGATIVE
HPV16 DNA SPEC QL NAA+PROBE: NEGATIVE
HPV18 DNA SPEC QL NAA+PROBE: NEGATIVE
LAB AP HPV GENOTYPE QUESTION: YES
LAB AP HPV HR: NORMAL
LABORATORY COMMENT REPORT: NORMAL
LABORATORY COMMENT REPORT: NORMAL
MENSTRUAL HX REPORTED: NORMAL
PATH REPORT.TOTAL CANCER: NORMAL

## 2024-03-18 RX ORDER — BLOOD-GLUCOSE SENSOR
EACH MISCELLANEOUS
Qty: 2 EACH | Refills: 11 | Status: SHIPPED | OUTPATIENT
Start: 2024-03-18

## 2024-03-18 NOTE — TELEPHONE ENCOUNTER
Patient is requesting a referral to see Dr. Corona, rheumatology.    She also needs a refill of her Jignesh Sensors    [facility]   Referred by: Parth Bowden MD; Medical Diagnosis (from order):    Diagnosis Information      Diagnosis    V45.4 (ICD-9-CM) - Z98.1 (ICD-10-CM) - S/P lumbar spinal fusion    338.18 (ICD-9-CM) - G89.18 (ICD-10-CM) - Post-operative pain                Daily Treatment Note    Visit:  5     SUBJECTIVE                                                                                                               5 / 10 PT visits  Patient stated that symptoms is about R SI joint 1/10. ( was 4-5/10 )   She is tired today but did a lot of housekeeping yesterday . She got longer mop that she doesn't need to bend to much. Leg weakness RLE ; after running errands R bone type pain.  She mentioned US and taping was helping.   Stress has been issue.     OBJECTIVE                                                                                                                        TREATMENT                                                                                                                  Therapeutic Exercise:  Seated hamstring stretch R L 3 reps 10 count hold NP  Seated  R L piriformis stretch : trunk lean forward 3 reps 10 counts NP  Seated sciatic nerve glide with ankle pump oscillations 10x  3 reps RLE LLE NP  Isometric abdominals in sitting and standing 10 count hold 1-2 reps   One foot balance R L with isometric abdominals 1 reps 10 counts   1 reps 15 count hold  Tandem stance R foot in front, L foot in front with isometric abdominals 3 reps 20 counts   Gastroc stretch R L 3 reps 20 counts  Pelvic tilt 10x, 3 sec hold  SKTC hip isometric 5x each  Kinesiotape R lumbar yvonne and X pattern over R SI joint NP       Therapeutic Activity:  Patient given a lumbar roll to try using in her car as 20 minute drive from home to PT department was uncomfortable and painful.   Discuss importance of posture awareness : avoid slouching, bringing her neck forward. Sit up tall avoid slouching   importance of posture awareness/  shift positions often   avoid knees higher than hips   bed mobility : pillow under knees in supine, pillow between knees in sidelying    Home Exercise Program/Education Materials: Access Code: [email or web address]  URL: [email or web address]  Date: 08/19/2021  Prepared by: Joselo Jackson    Exercises  Seated Hamstring Stretch - 1-2 x daily - 7 x weekly - 3-5 reps - 10-20 hold  Seated Piriformis Stretch with Trunk Bend - 1-2 x daily - 7 x weekly - 3-5 reps - 10-20 hold  Seated Long Arc Quad - 1-2 x daily - 7 x weekly - 3-5 reps  Alternating Single leg balance - lift knee up - 1-2 x daily - 7 x weekly - 3-5 reps - 10 hold  Standing Tandem Balance with Counter Support - 1-2 x daily - 7 x weekly - 3-5 reps - 10 hold  Seated Transversus Abdominis Bracing - 1-6 x daily - 7 x weekly - 1-3 reps - 10 hold  Standing Transverse Abdominis Contraction - 1-6 x daily - 7 x weekly - 1-3 reps - 10 hold       ASSESSMENT                                                                                                               Patient was referred for PT evaluation and treatment with a diagnosis of S/P post op pain after spinal surgery / lumbar fusion 7/1/2021. Prior to surgery mainly R LBP posterior RLE to foot , currently less symptoms. Symptoms are better unless she sits too long, lay in down in certain position. Patient have restrictions for the first 6 weeks. She will see MD next week and will get an updated prescription.   Oswestry :  46.67% impairment at eval   Patient presented with weak core muscles, slight one foot balance issues, decreased LE flexibility and strength; pain still an issue with increased activity but has been less as compared to pre surgery.  Postural awareness discussed as well as she reported that lumbar roll for sitting tolerance in car was very helpful when she had to see MD for a follow up ( 2 hour commute ) . HEP given and patient understood treatment plan.   Less tenderness noted B  paralumbar area; R SI area. R sacrum superior area, lateral B hips. Does not need to be taped or US treatment today.         PLAN                                                                                                                           Suggestions for next session as indicated: Progress per plan of care      GOALS                                                                                                                           Post op back pain with LE radiculopathy  Limited mobility in the lumbar spine  Low back/lumbopelvic muscle weakness  Postural dysfunction  The above improvements in impairments to assist in obtaining goals listed below  Long Term Goals: to be met by end of plan of care  1. Patient will tolerate standing for 60-70 minutes to allow for performance of housechore tasks like cooking by end of plan of care  2. Patient will tolerate sitting for 60 minutes to allow for performance of driving or computer work tasks without interruption by end of plan of care.  3. Patient will demonstrate improved oswestry score from 46.67% impairment to 30-35% impairment by end of plan of care.  4. Patient will be independent with progressed and modified home exercise program.      Therapy procedure time and total treatment time can be found documented on the Time Entry flowsheet

## 2024-03-19 ENCOUNTER — DOCUMENTATION (OUTPATIENT)
Dept: CARE COORDINATION | Facility: CLINIC | Age: 44
End: 2024-03-19
Payer: COMMERCIAL

## 2024-03-19 NOTE — CARE PLAN
This SW spoke with patient about seeing a speciality doctor. Patient was asked if she needed to do a 3-way call and patient stated no. Patient has scheduled prior apts with other rheumatologists herself and believes that she can do schedule another apt herself.   Pt needed assistance with a new rheumatologist's name. Pt was given: Dr. Sarah Sanchez provides general rheumatology care in [place]. Provider is [facility] provider. Pt is aware. Pt will contact provider and follow up with this SW.  Shari Cheek MSW LSW   3/19/24

## 2024-03-19 NOTE — PROGRESS NOTES
Conversation with Provider/patient needs rheumatologist/but cannot navigate scheduling due to cognition issues. NOTE: Pt is not opened with [facility] due to fee.   Pt has been referred to [facility] RN. This SW has messaged management to ask if 3-way call can be done with [facility] RN and patient to help with scheduling an apt.   Shari Cheek MSW LSW

## 2024-03-20 ENCOUNTER — DOCUMENTATION (OUTPATIENT)
Dept: CARE COORDINATION | Facility: CLINIC | Age: 44
End: 2024-03-20
Payer: COMMERCIAL

## 2024-03-20 NOTE — PROGRESS NOTES
Pt was assisted by this  for rheumatology scheduling apt.  Pt has an apt with [facility]/Dr. Isabela Ramos in [place]. [phone].  7/19/24 at 1pm. Pt has been notified via email and is aware of her apt.   Shari Cheek MSW LSW   3/20/24

## 2024-03-20 NOTE — PROGRESS NOTES
"Pt has emailed this SW stating: \" reached out and tried to schedule with Dr. Sanchez. Her office said she is on a medical leave of absence and they aren't scheduling for her.\"  This SW Responded: \"Did they offer another doctor from that office?  Also, [facility] has Dr. Gala Aldridge: [phone].  They might be able to assist at [facility].\"  Pt responded: in regards to [facility]/ \"They did not. She said they will call me back about it. This is what I get. Call backs that do not call back so Im used it to it by now I'll try to other place. It's doubtful for me getting a ride into [place].\"  This SW responded: \"Call [facility] and ask if they have a local office/sometimes they do.\"    Shari Hillman MSW LSW   3/20/24   "

## 2024-03-20 NOTE — PROGRESS NOTES
"Provided the patient with a possible nurse line under insurance: \"Call [phone] (TTY: [phone] or [phone]) to talk to a [place] [address] registered nurse. We're here for you 24 hours a day, 7 days a week..\"  "

## 2024-03-20 NOTE — PROGRESS NOTES
"Pt was sending email about medical info.  This SW did explain in an email:    \"There might be a nurse line on your insurance to call too to discuss all this medical with. It's beyond my scope. I am not medical.  I am mostly linking patient's to resources. \"    Shari Cheek MSW LSW   3/20/24  "

## 2024-03-26 DIAGNOSIS — E11.9 TYPE 2 DIABETES MELLITUS WITHOUT COMPLICATION, UNSPECIFIED WHETHER LONG TERM INSULIN USE (MULTI): ICD-10-CM

## 2024-03-27 ENCOUNTER — TELEPHONE (OUTPATIENT)
Dept: PRIMARY CARE | Facility: CLINIC | Age: 44
End: 2024-03-27
Payer: COMMERCIAL

## 2024-03-27 DIAGNOSIS — E11.9 TYPE 2 DIABETES MELLITUS WITHOUT COMPLICATION, UNSPECIFIED WHETHER LONG TERM INSULIN USE (MULTI): ICD-10-CM

## 2024-03-27 RX ORDER — DAPAGLIFLOZIN 10 MG/1
10 TABLET, FILM COATED ORAL DAILY
Qty: 90 TABLET | Refills: 1 | Status: SHIPPED | OUTPATIENT
Start: 2024-03-27 | End: 2024-03-27 | Stop reason: SDUPTHER

## 2024-03-27 NOTE — TELEPHONE ENCOUNTER
Rx Refill Request Telephone Encounter    Name:  Alanna Hickman  :  [id number]  Medication Name:  Farxiga 10 mg, she Is out, told her [facility] is out             Specific Pharmacy location:  [facility]  Date of last appointment:    Date of next appointment:    Best number to reach patient:

## 2024-03-28 RX ORDER — DAPAGLIFLOZIN 10 MG/1
10 TABLET, FILM COATED ORAL DAILY
Qty: 90 TABLET | Refills: 1 | Status: SHIPPED | OUTPATIENT
Start: 2024-03-28

## 2024-04-16 ENCOUNTER — APPOINTMENT (OUTPATIENT)
Dept: DERMATOLOGY | Facility: CLINIC | Age: 44
End: 2024-04-16
Payer: COMMERCIAL

## 2024-04-17 ENCOUNTER — PATIENT OUTREACH (OUTPATIENT)
Dept: CARE COORDINATION | Facility: CLINIC | Age: 44
End: 2024-04-17
Payer: COMMERCIAL

## 2024-04-17 NOTE — CARE PLAN
SW spoke with Pt today. Pt has no other social work needs at this time. No speciality apts needs to be scheduled per Pt. Pt is not connected to [facility] any longer/per Pt.   Pt's mother is sick is at [facility]. Pt has this 's info if any future needs arise/connection to community resources.  Shari Cheek MSW LSW   4/17/24

## 2024-04-23 ENCOUNTER — APPOINTMENT (OUTPATIENT)
Dept: RHEUMATOLOGY | Facility: CLINIC | Age: 44
End: 2024-04-23
Payer: COMMERCIAL

## 2024-05-15 ENCOUNTER — APPOINTMENT (OUTPATIENT)
Dept: PRIMARY CARE | Facility: CLINIC | Age: 44
End: 2024-05-15
Payer: COMMERCIAL

## 2024-05-23 ENCOUNTER — OFFICE VISIT (OUTPATIENT)
Dept: PRIMARY CARE | Facility: CLINIC | Age: 44
End: 2024-05-23
Payer: COMMERCIAL

## 2024-05-23 VITALS
DIASTOLIC BLOOD PRESSURE: 94 MMHG | HEART RATE: 94 BPM | SYSTOLIC BLOOD PRESSURE: 142 MMHG | OXYGEN SATURATION: 96 % | WEIGHT: 234 LBS | BODY MASS INDEX: 36.65 KG/M2

## 2024-05-23 DIAGNOSIS — M79.2 THORACIC NEURALGIA: ICD-10-CM

## 2024-05-23 DIAGNOSIS — Z79.4 TYPE 2 DIABETES MELLITUS WITH DIABETIC NEUROPATHY, WITH LONG-TERM CURRENT USE OF INSULIN (MULTI): ICD-10-CM

## 2024-05-23 DIAGNOSIS — E11.40 TYPE 2 DIABETES MELLITUS WITH DIABETIC NEUROPATHY, WITH LONG-TERM CURRENT USE OF INSULIN (MULTI): ICD-10-CM

## 2024-05-23 DIAGNOSIS — E11.65 TYPE 2 DIABETES MELLITUS WITH HYPERGLYCEMIA, WITH LONG-TERM CURRENT USE OF INSULIN (MULTI): Primary | ICD-10-CM

## 2024-05-23 DIAGNOSIS — R07.81 PLEURODYNIA: ICD-10-CM

## 2024-05-23 DIAGNOSIS — G62.9 POLYNEUROPATHY: ICD-10-CM

## 2024-05-23 DIAGNOSIS — Z79.4 TYPE 2 DIABETES MELLITUS WITH HYPERGLYCEMIA, WITH LONG-TERM CURRENT USE OF INSULIN (MULTI): Primary | ICD-10-CM

## 2024-05-23 LAB — POC HEMOGLOBIN A1C: 8.8 % (ref 4.2–6.5)

## 2024-05-23 PROCEDURE — 99213 OFFICE O/P EST LOW 20 MIN: CPT | Performed by: PHYSICIAN ASSISTANT

## 2024-05-23 PROCEDURE — 3080F DIAST BP >= 90 MM HG: CPT | Performed by: PHYSICIAN ASSISTANT

## 2024-05-23 PROCEDURE — 3077F SYST BP >= 140 MM HG: CPT | Performed by: PHYSICIAN ASSISTANT

## 2024-05-23 PROCEDURE — 83036 HEMOGLOBIN GLYCOSYLATED A1C: CPT | Performed by: PHYSICIAN ASSISTANT

## 2024-05-23 RX ORDER — PREDNISONE 5 MG/1
TABLET ORAL
Qty: 30 TABLET | Refills: 0 | Status: SHIPPED | OUTPATIENT
Start: 2024-05-23

## 2024-05-23 RX ORDER — INSULIN DEGLUDEC 100 U/ML
40 INJECTION, SOLUTION SUBCUTANEOUS NIGHTLY
Qty: 15 ML | Refills: 3 | Status: SHIPPED | OUTPATIENT
Start: 2024-05-23

## 2024-05-23 ASSESSMENT — ENCOUNTER SYMPTOMS
CHEST TIGHTNESS: 0
DIZZINESS: 0
LIGHT-HEADEDNESS: 0
FREQUENCY: 0
APPETITE CHANGE: 0
WOUND: 0
ACTIVITY CHANGE: 0
SHORTNESS OF BREATH: 0

## 2024-05-23 ASSESSMENT — PAIN SCALES - GENERAL: PAINLEVEL: 7

## 2024-05-23 NOTE — PROGRESS NOTES
Subjective   Patient ID: Alanna Hickman is a 43 y.o. female who presents for Follow-up (Diabetes check).    Diabetes  She presents for her follow-up diabetic visit. She has type 2 diabetes mellitus. Her disease course has been fluctuating. There are no hypoglycemic associated symptoms. Pertinent negatives for hypoglycemia include no dizziness. Pertinent negatives for diabetes include no chest pain. Diabetic complications include a CVA. Risk factors for coronary artery disease include stress, tobacco exposure, diabetes mellitus and dyslipidemia. Her breakfast blood glucose range is generally 130-140 mg/dl. Her dinner blood glucose range is generally 140-180 mg/dl.    Having trouble keeping the sensor on. She recently changed location and it is getting better. Reviewed  her CGM sensor readings.  Some sugars go up to over 200 in the morning or late in the evening. Doesn't always eat breakfast.  Is stressed about gaining weight.   Review of Systems   Constitutional:  Negative for activity change and appetite change.   Eyes:  Negative for visual disturbance.   Respiratory:  Negative for chest tightness and shortness of breath.    Cardiovascular:  Negative for chest pain and leg swelling.   Genitourinary:  Negative for frequency and urgency.   Skin:  Negative for rash and wound.   Neurological:  Negative for dizziness, syncope and light-headedness.       Objective   BP (!) 142/94   Pulse 94   Wt 106 kg (234 lb)   SpO2 96%   BMI 36.65 kg/m²     Physical Exam  Constitutional:       Appearance: She is obese.   Cardiovascular:      Rate and Rhythm: Normal rate and regular rhythm.      Pulses: Normal pulses.   Pulmonary:      Effort: Pulmonary effort is normal.   Musculoskeletal:      Right lower leg: No edema.      Left lower leg: No edema.   Feet:      Right foot:      Skin integrity: Skin integrity normal. No skin breakdown.      Left foot:      Skin integrity: Skin integrity normal. No skin breakdown.    Neurological:      General: No focal deficit present.      Mental Status: She is alert. Mental status is at baseline.         Assessment/Plan   Diagnoses and all orders for this visit:  Type 2 diabetes mellitus with hyperglycemia, with long-term current use of insulin (Multi)  -     POCT glycosylated hemoglobin (Hb A1C) manually resulted  Polyneuropathy  Will increase insulin to 40 units of Tresiba.   Complains of left rib /lung pain for the past 4-5 months. Has mild pain on palpation today. No swelling noted.

## 2024-05-28 ENCOUNTER — HOSPITAL ENCOUNTER (OUTPATIENT)
Dept: RADIOLOGY | Facility: CLINIC | Age: 44
Discharge: HOME | End: 2024-05-28
Payer: COMMERCIAL

## 2024-05-28 DIAGNOSIS — R07.81 PLEURODYNIA: ICD-10-CM

## 2024-05-28 PROCEDURE — 71046 X-RAY EXAM CHEST 2 VIEWS: CPT | Performed by: RADIOLOGY

## 2024-05-28 PROCEDURE — 71046 X-RAY EXAM CHEST 2 VIEWS: CPT

## 2024-05-31 DIAGNOSIS — G89.29 OTHER CHRONIC PAIN: Primary | ICD-10-CM

## 2024-05-31 RX ORDER — METHYLPREDNISOLONE 4 MG/1
TABLET ORAL
Qty: 21 TABLET | Refills: 0 | Status: SHIPPED | OUTPATIENT
Start: 2024-05-31 | End: 2024-06-07

## 2024-06-07 ENCOUNTER — TELEPHONE (OUTPATIENT)
Dept: PRIMARY CARE | Facility: CLINIC | Age: 44
End: 2024-06-07
Payer: COMMERCIAL

## 2024-06-07 DIAGNOSIS — E11.9 TYPE 2 DIABETES MELLITUS WITHOUT COMPLICATION, UNSPECIFIED WHETHER LONG TERM INSULIN USE (MULTI): ICD-10-CM

## 2024-06-07 DIAGNOSIS — Z72.0 TOBACCO ABUSE: Primary | ICD-10-CM

## 2024-06-07 RX ORDER — NICOTINE 7MG/24HR
1 PATCH, TRANSDERMAL 24 HOURS TRANSDERMAL EVERY 24 HOURS
Qty: 14 PATCH | Refills: 0 | Status: SHIPPED | OUTPATIENT
Start: 2024-06-07 | End: 2024-06-21

## 2024-06-11 ENCOUNTER — APPOINTMENT (OUTPATIENT)
Dept: PRIMARY CARE | Facility: CLINIC | Age: 44
End: 2024-06-11
Payer: COMMERCIAL

## 2024-06-12 ENCOUNTER — TELEPHONE (OUTPATIENT)
Dept: PRIMARY CARE | Facility: CLINIC | Age: 44
End: 2024-06-12
Payer: COMMERCIAL

## 2024-06-12 DIAGNOSIS — F17.210 CIGARETTE NICOTINE DEPENDENCE WITHOUT COMPLICATION: Primary | ICD-10-CM

## 2024-06-12 RX ORDER — NICOTINE 7MG/24HR
1 PATCH, TRANSDERMAL 24 HOURS TRANSDERMAL EVERY 24 HOURS
Qty: 14 PATCH | Refills: 0 | Status: SHIPPED | OUTPATIENT
Start: 2024-06-12 | End: 2024-06-13 | Stop reason: ENTERED-IN-ERROR

## 2024-06-12 NOTE — TELEPHONE ENCOUNTER
Patient called [phone] [facility] sent in RX Nicotene patches step 3, she needs step 1, please re- send [facility] [place]

## 2024-06-13 DIAGNOSIS — Z72.0 TOBACCO ABUSE: Primary | ICD-10-CM

## 2024-06-13 RX ORDER — IBUPROFEN 200 MG
1 TABLET ORAL EVERY 24 HOURS
Qty: 21 PATCH | Refills: 0 | Status: SHIPPED | OUTPATIENT
Start: 2024-06-13 | End: 2024-07-04

## 2024-06-20 DIAGNOSIS — E11.9 TYPE 2 DIABETES MELLITUS WITHOUT COMPLICATION, WITH LONG-TERM CURRENT USE OF INSULIN (MULTI): ICD-10-CM

## 2024-06-20 DIAGNOSIS — Z79.4 TYPE 2 DIABETES MELLITUS WITHOUT COMPLICATION, WITH LONG-TERM CURRENT USE OF INSULIN (MULTI): ICD-10-CM

## 2024-06-20 RX ORDER — BLOOD SUGAR DIAGNOSTIC
STRIP MISCELLANEOUS
Qty: 200 STRIP | Refills: 3 | Status: SHIPPED | OUTPATIENT
Start: 2024-06-20

## 2024-06-24 DIAGNOSIS — E11.9 TYPE 2 DIABETES MELLITUS WITHOUT COMPLICATION, WITH LONG-TERM CURRENT USE OF INSULIN (MULTI): ICD-10-CM

## 2024-06-24 DIAGNOSIS — Z79.4 TYPE 2 DIABETES MELLITUS WITHOUT COMPLICATION, WITH LONG-TERM CURRENT USE OF INSULIN (MULTI): ICD-10-CM

## 2024-06-24 RX ORDER — PEN NEEDLE, DIABETIC 31 GX3/16"
NEEDLE, DISPOSABLE MISCELLANEOUS
Qty: 100 EACH | Refills: 3 | Status: SHIPPED | OUTPATIENT
Start: 2024-06-24

## 2024-07-05 DIAGNOSIS — Z79.4 TYPE 2 DIABETES MELLITUS WITH DIABETIC POLYNEUROPATHY, WITH LONG-TERM CURRENT USE OF INSULIN (MULTI): ICD-10-CM

## 2024-07-05 DIAGNOSIS — E11.42 TYPE 2 DIABETES MELLITUS WITH DIABETIC POLYNEUROPATHY, WITH LONG-TERM CURRENT USE OF INSULIN (MULTI): ICD-10-CM

## 2024-07-05 RX ORDER — METFORMIN HYDROCHLORIDE 500 MG/1
1500 TABLET, EXTENDED RELEASE ORAL DAILY
Qty: 270 TABLET | Refills: 1 | Status: SHIPPED | OUTPATIENT
Start: 2024-07-05

## 2024-07-25 DIAGNOSIS — E11.65 TYPE 2 DIABETES MELLITUS WITH HYPERGLYCEMIA, WITH LONG-TERM CURRENT USE OF INSULIN (MULTI): ICD-10-CM

## 2024-07-25 DIAGNOSIS — E11.9 TYPE 2 DIABETES MELLITUS WITHOUT COMPLICATION, WITHOUT LONG-TERM CURRENT USE OF INSULIN (MULTI): ICD-10-CM

## 2024-07-25 DIAGNOSIS — Z79.4 TYPE 2 DIABETES MELLITUS WITH HYPERGLYCEMIA, WITH LONG-TERM CURRENT USE OF INSULIN (MULTI): ICD-10-CM

## 2024-07-25 NOTE — TELEPHONE ENCOUNTER
Freestyle Jignesh 3 meter refill to [facility].    Patient lost meter and needs new one ASAP please.

## 2024-07-26 RX ORDER — BLOOD-GLUCOSE,RECEIVER,CONT
EACH MISCELLANEOUS
Qty: 1 EACH | Refills: 0 | Status: SHIPPED | OUTPATIENT
Start: 2024-07-26

## 2024-07-29 DIAGNOSIS — Z79.4 TYPE 2 DIABETES MELLITUS WITH HYPERGLYCEMIA, WITH LONG-TERM CURRENT USE OF INSULIN (MULTI): ICD-10-CM

## 2024-07-29 DIAGNOSIS — E11.65 TYPE 2 DIABETES MELLITUS WITH HYPERGLYCEMIA, WITH LONG-TERM CURRENT USE OF INSULIN (MULTI): ICD-10-CM

## 2024-07-29 RX ORDER — BLOOD-GLUCOSE,RECEIVER,CONT
EACH MISCELLANEOUS
Qty: 1 EACH | Refills: 0 | Status: SHIPPED | OUTPATIENT
Start: 2024-07-29

## 2024-08-21 ENCOUNTER — APPOINTMENT (OUTPATIENT)
Dept: DERMATOLOGY | Facility: CLINIC | Age: 44
End: 2024-08-21
Payer: COMMERCIAL

## 2024-08-23 ENCOUNTER — APPOINTMENT (OUTPATIENT)
Dept: PRIMARY CARE | Facility: CLINIC | Age: 44
End: 2024-08-23
Payer: COMMERCIAL

## 2024-08-27 ENCOUNTER — APPOINTMENT (OUTPATIENT)
Dept: PRIMARY CARE | Facility: CLINIC | Age: 44
End: 2024-08-27
Payer: COMMERCIAL

## 2024-09-05 ENCOUNTER — TELEPHONE (OUTPATIENT)
Dept: PRIMARY CARE | Facility: CLINIC | Age: 44
End: 2024-09-05

## 2024-09-05 ENCOUNTER — APPOINTMENT (OUTPATIENT)
Dept: PRIMARY CARE | Facility: CLINIC | Age: 44
End: 2024-09-05
Payer: COMMERCIAL

## 2024-09-05 VITALS
TEMPERATURE: 97 F | DIASTOLIC BLOOD PRESSURE: 74 MMHG | WEIGHT: 237 LBS | HEART RATE: 95 BPM | HEIGHT: 67 IN | SYSTOLIC BLOOD PRESSURE: 124 MMHG | BODY MASS INDEX: 37.2 KG/M2 | OXYGEN SATURATION: 98 %

## 2024-09-05 DIAGNOSIS — E11.9 TYPE 2 DIABETES MELLITUS WITHOUT COMPLICATION, WITH LONG-TERM CURRENT USE OF INSULIN (MULTI): Primary | ICD-10-CM

## 2024-09-05 DIAGNOSIS — E11.9 TYPE 2 DIABETES MELLITUS WITHOUT COMPLICATION, WITHOUT LONG-TERM CURRENT USE OF INSULIN (MULTI): ICD-10-CM

## 2024-09-05 DIAGNOSIS — Z79.4 TYPE 2 DIABETES MELLITUS WITHOUT COMPLICATION, WITH LONG-TERM CURRENT USE OF INSULIN (MULTI): Primary | ICD-10-CM

## 2024-09-05 DIAGNOSIS — K21.9 GASTROESOPHAGEAL REFLUX DISEASE WITHOUT ESOPHAGITIS: ICD-10-CM

## 2024-09-05 LAB — POC HEMOGLOBIN A1C: 7.2 % (ref 4.2–6.5)

## 2024-09-05 PROCEDURE — 99213 OFFICE O/P EST LOW 20 MIN: CPT | Performed by: PHYSICIAN ASSISTANT

## 2024-09-05 PROCEDURE — 4004F PT TOBACCO SCREEN RCVD TLK: CPT | Performed by: PHYSICIAN ASSISTANT

## 2024-09-05 PROCEDURE — 83036 HEMOGLOBIN GLYCOSYLATED A1C: CPT | Performed by: PHYSICIAN ASSISTANT

## 2024-09-05 PROCEDURE — 3074F SYST BP LT 130 MM HG: CPT | Performed by: PHYSICIAN ASSISTANT

## 2024-09-05 PROCEDURE — 3078F DIAST BP <80 MM HG: CPT | Performed by: PHYSICIAN ASSISTANT

## 2024-09-05 PROCEDURE — 3008F BODY MASS INDEX DOCD: CPT | Performed by: PHYSICIAN ASSISTANT

## 2024-09-05 RX ORDER — OMEPRAZOLE 20 MG/1
20 CAPSULE, DELAYED RELEASE ORAL DAILY
Qty: 30 CAPSULE | Refills: 2 | Status: SHIPPED | OUTPATIENT
Start: 2024-09-05 | End: 2025-03-04

## 2024-09-05 RX ORDER — PANTOPRAZOLE SODIUM 20 MG/1
20 TABLET, DELAYED RELEASE ORAL
Qty: 30 TABLET | Refills: 3 | Status: SHIPPED | OUTPATIENT
Start: 2024-09-05

## 2024-09-05 RX ORDER — AMITRIPTYLINE HYDROCHLORIDE 10 MG/1
TABLET, FILM COATED ORAL
COMMUNITY
Start: 2024-07-29

## 2024-09-05 RX ORDER — CYCLOBENZAPRINE HCL 10 MG
TABLET ORAL
COMMUNITY
Start: 2024-08-01

## 2024-09-05 ASSESSMENT — ENCOUNTER SYMPTOMS
MYALGIAS: 1
NUMBNESS: 1
ARTHRALGIAS: 1
APPETITE CHANGE: 0
WOUND: 0
LIGHT-HEADEDNESS: 0
ACTIVITY CHANGE: 0
SHORTNESS OF BREATH: 0
CHEST TIGHTNESS: 0
BACK PAIN: 1
FREQUENCY: 0
DIZZINESS: 0

## 2024-09-05 ASSESSMENT — PATIENT HEALTH QUESTIONNAIRE - PHQ9
2. FEELING DOWN, DEPRESSED OR HOPELESS: NOT AT ALL
SUM OF ALL RESPONSES TO PHQ9 QUESTIONS 1 AND 2: 0
1. LITTLE INTEREST OR PLEASURE IN DOING THINGS: NOT AT ALL

## 2024-09-05 ASSESSMENT — PAIN SCALES - GENERAL: PAINLEVEL: 0-NO PAIN

## 2024-09-05 NOTE — PROGRESS NOTES
"Subjective   Patient ID: Alanna Hickman is a 43 y.o. female who presents for Diabetes (Flu vaccine declined today/Labs ordered 6/2024 but patient never had drawn).    Diabetes  She presents for her follow-up diabetic visit. She has type 2 diabetes mellitus. Pertinent negatives for hypoglycemia include no dizziness. Pertinent negatives for diabetes include no chest pain.    Still has bad neuropathy.  She is seeing Dr. Ramos who is starting her on sulfasalazine but she has been hesitant on starting it.  She still seeing pain management and is thinking may be some sort of neurostimulation would help her neuropathy since Lyrica and gabapentin did not help her.    Review of Systems   Constitutional:  Negative for activity change and appetite change.   Eyes:  Negative for visual disturbance.   Respiratory:  Negative for chest tightness and shortness of breath.    Cardiovascular:  Negative for chest pain and leg swelling.   Genitourinary:  Negative for frequency and urgency.   Musculoskeletal:  Positive for arthralgias, back pain, gait problem and myalgias.   Skin:  Negative for rash and wound.   Neurological:  Positive for numbness. Negative for dizziness, syncope and light-headedness.       Objective   /74 (BP Location: Left arm, Patient Position: Sitting, BP Cuff Size: Large adult)   Pulse 95   Temp 36.1 °C (97 °F)   Ht 1.702 m (5' 7\")   Wt 108 kg (237 lb)   SpO2 98%   BMI 37.12 kg/m²     Physical Exam  Cardiovascular:      Rate and Rhythm: Normal rate and regular rhythm.      Pulses: Normal pulses.   Pulmonary:      Effort: Pulmonary effort is normal.   Musculoskeletal:      Right lower leg: No edema.      Left lower leg: No edema.   Feet:      Right foot:      Protective Sensation: 4 sites tested.   1 site sensed.     Skin integrity: Skin integrity normal. No skin breakdown.      Left foot:      Protective Sensation: 4 sites tested.   1 site sensed.     Skin integrity: Skin integrity normal. No skin " breakdown.   Neurological:      General: No focal deficit present.      Mental Status: She is alert. Mental status is at baseline.         Assessment/Plan   Diagnoses and all orders for this visit:  Type 2 diabetes mellitus without complication, with long-term current use of insulin (Multi)  -     POCT glycosylated hemoglobin (Hb A1C) manually resulted  Gastroesophageal reflux disease without esophagitis  -     omeprazole (PriLOSEC) 20 mg DR capsule; Take 1 capsule (20 mg) by mouth once daily. Do not crush or chew.  -     pantoprazole (ProtoNix) 20 mg EC tablet; Take 1 tablet (20 mg) by mouth once daily in the morning. Take before meals. Do not crush, chew, or split.  Other orders  -     Follow Up In Primary Care - Established  -     Follow Up In Primary Care - Established; Future    A1c is actually better. She will follow up with Dr. Ramos . Encouraged her to check her sugars. She states the CGM sensors always fall off.

## 2024-09-27 ENCOUNTER — TELEPHONE (OUTPATIENT)
Dept: PRIMARY CARE | Facility: CLINIC | Age: 44
End: 2024-09-27
Payer: COMMERCIAL

## 2024-09-27 DIAGNOSIS — E11.9 TYPE 2 DIABETES MELLITUS WITHOUT COMPLICATION, UNSPECIFIED WHETHER LONG TERM INSULIN USE (MULTI): ICD-10-CM

## 2024-09-27 RX ORDER — DAPAGLIFLOZIN 10 MG/1
10 TABLET, FILM COATED ORAL DAILY
Qty: 90 TABLET | Refills: 1 | Status: SHIPPED | OUTPATIENT
Start: 2024-09-27

## 2024-09-27 NOTE — TELEPHONE ENCOUNTER
Duplicate request, rx pended to be sent.  
Patient called in for a refill  [phone]  [facility]eron[facility]  Farxiga 10mg  
room air

## 2024-10-29 ENCOUNTER — APPOINTMENT (OUTPATIENT)
Dept: RADIOLOGY | Facility: CLINIC | Age: 44
End: 2024-10-29
Payer: COMMERCIAL

## 2024-10-29 DIAGNOSIS — Z12.31 SCREENING MAMMOGRAM FOR BREAST CANCER: ICD-10-CM

## 2024-11-04 ENCOUNTER — OFFICE VISIT (OUTPATIENT)
Dept: PRIMARY CARE | Facility: CLINIC | Age: 44
End: 2024-11-04
Payer: COMMERCIAL

## 2024-11-04 VITALS
DIASTOLIC BLOOD PRESSURE: 84 MMHG | SYSTOLIC BLOOD PRESSURE: 112 MMHG | TEMPERATURE: 97.9 F | WEIGHT: 234 LBS | BODY MASS INDEX: 36.65 KG/M2 | OXYGEN SATURATION: 99 % | HEART RATE: 83 BPM

## 2024-11-04 DIAGNOSIS — J01.20 ACUTE NON-RECURRENT ETHMOIDAL SINUSITIS: Primary | ICD-10-CM

## 2024-11-04 DIAGNOSIS — J45.20 MILD INTERMITTENT ASTHMA, UNSPECIFIED WHETHER COMPLICATED (HHS-HCC): ICD-10-CM

## 2024-11-04 PROCEDURE — 4004F PT TOBACCO SCREEN RCVD TLK: CPT | Performed by: PHYSICIAN ASSISTANT

## 2024-11-04 PROCEDURE — 99213 OFFICE O/P EST LOW 20 MIN: CPT | Performed by: PHYSICIAN ASSISTANT

## 2024-11-04 PROCEDURE — 3074F SYST BP LT 130 MM HG: CPT | Performed by: PHYSICIAN ASSISTANT

## 2024-11-04 PROCEDURE — 3079F DIAST BP 80-89 MM HG: CPT | Performed by: PHYSICIAN ASSISTANT

## 2024-11-04 RX ORDER — SULFASALAZINE 500 MG/1
TABLET ORAL
COMMUNITY
Start: 2024-08-22 | End: 2024-11-04 | Stop reason: WASHOUT

## 2024-11-04 RX ORDER — FLUOXETINE HYDROCHLORIDE 40 MG/1
1 CAPSULE ORAL
COMMUNITY
Start: 2024-08-26 | End: 2024-11-04 | Stop reason: WASHOUT

## 2024-11-04 RX ORDER — ALBUTEROL SULFATE 90 UG/1
2 INHALANT RESPIRATORY (INHALATION) EVERY 6 HOURS PRN
Qty: 36 G | Refills: 0 | Status: SHIPPED | OUTPATIENT
Start: 2024-11-04

## 2024-11-04 RX ORDER — AMOXICILLIN AND CLAVULANATE POTASSIUM 875; 125 MG/1; MG/1
875 TABLET, FILM COATED ORAL 2 TIMES DAILY
Qty: 20 TABLET | Refills: 0 | Status: SHIPPED | OUTPATIENT
Start: 2024-11-04 | End: 2024-11-14

## 2024-11-04 ASSESSMENT — ENCOUNTER SYMPTOMS
HEADACHES: 1
SINUS PAIN: 1
SORE THROAT: 1
WHEEZING: 1
COUGH: 1

## 2024-11-04 ASSESSMENT — PAIN SCALES - GENERAL: PAINLEVEL_OUTOF10: 2

## 2024-11-04 NOTE — PROGRESS NOTES
Subjective   Patient ID: Alanna Hickman is a 44 y.o. female who presents for URI (Cough, congestion, ribs hurt from coughing, sinus congestion and drainage since 10/31.).    URI   This is a new problem. The current episode started in the past 7 days. There has been no fever. Associated symptoms include congestion, coughing, ear pain, headaches, sinus pain, sneezing, a sore throat and wheezing.        Review of Systems   HENT:  Positive for congestion, ear pain, sinus pain, sneezing and sore throat.    Respiratory:  Positive for cough and wheezing.    Neurological:  Positive for headaches.       Objective   /84   Pulse 83   Temp 36.6 °C (97.9 °F)   Wt 106 kg (234 lb)   SpO2 99%   BMI 36.65 kg/m²     Physical Exam  HENT:      Right Ear: Tympanic membrane normal.      Left Ear: Tympanic membrane normal.      Nose: Congestion present.      Right Sinus: Maxillary sinus tenderness and frontal sinus tenderness present.      Left Sinus: Maxillary sinus tenderness and frontal sinus tenderness present.      Mouth/Throat:      Mouth: Mucous membranes are moist.      Pharynx: Posterior oropharyngeal erythema present.   Eyes:      Extraocular Movements: Extraocular movements intact.      Pupils: Pupils are equal, round, and reactive to light.   Cardiovascular:      Rate and Rhythm: Normal rate and regular rhythm.      Pulses: Normal pulses.   Pulmonary:      Breath sounds: Wheezing present.   Musculoskeletal:      Cervical back: Neck supple.   Neurological:      General: No focal deficit present.      Mental Status: She is alert. Mental status is at baseline.   Psychiatric:         Thought Content: Thought content normal.         Judgment: Judgment normal.         Assessment/Plan   Diagnoses and all orders for this visit:  Acute non-recurrent ethmoidal sinusitis  -     amoxicillin-pot clavulanate (Augmentin) 875-125 mg tablet; Take 1 tablet (875 mg) by mouth 2 times a day for 10 days.  Mild intermittent asthma,  unspecified whether complicated ([facility])  -     albuterol (Ventolin HFA) 90 mcg/actuation inhaler; Inhale 2 puffs every 6 hours if needed for wheezing.  Follow up if no better.

## 2024-11-13 ENCOUNTER — HOSPITAL ENCOUNTER (OUTPATIENT)
Dept: RADIOLOGY | Facility: CLINIC | Age: 44
Discharge: HOME | End: 2024-11-13
Payer: COMMERCIAL

## 2024-11-13 VITALS — WEIGHT: 234 LBS | BODY MASS INDEX: 36.65 KG/M2

## 2024-11-13 DIAGNOSIS — Z12.31 SCREENING MAMMOGRAM FOR BREAST CANCER: ICD-10-CM

## 2024-11-13 PROCEDURE — 77067 SCR MAMMO BI INCL CAD: CPT

## 2024-11-13 PROCEDURE — 77063 BREAST TOMOSYNTHESIS BI: CPT | Performed by: RADIOLOGY

## 2024-11-13 PROCEDURE — 77067 SCR MAMMO BI INCL CAD: CPT | Performed by: RADIOLOGY

## 2024-12-06 ENCOUNTER — APPOINTMENT (OUTPATIENT)
Dept: PRIMARY CARE | Facility: CLINIC | Age: 44
End: 2024-12-06
Payer: COMMERCIAL

## 2024-12-06 ENCOUNTER — HOSPITAL ENCOUNTER (OUTPATIENT)
Dept: RADIOLOGY | Facility: CLINIC | Age: 44
Discharge: HOME | End: 2024-12-06
Payer: COMMERCIAL

## 2024-12-06 VITALS
OXYGEN SATURATION: 94 % | BODY MASS INDEX: 36.88 KG/M2 | SYSTOLIC BLOOD PRESSURE: 120 MMHG | DIASTOLIC BLOOD PRESSURE: 70 MMHG | HEIGHT: 67 IN | WEIGHT: 235 LBS | HEART RATE: 86 BPM | TEMPERATURE: 97.2 F

## 2024-12-06 DIAGNOSIS — J45.20 MILD INTERMITTENT ASTHMA, UNSPECIFIED WHETHER COMPLICATED (HHS-HCC): ICD-10-CM

## 2024-12-06 DIAGNOSIS — J20.9 ACUTE BRONCHITIS, UNSPECIFIED ORGANISM: ICD-10-CM

## 2024-12-06 DIAGNOSIS — Z79.4 TYPE 2 DIABETES MELLITUS WITHOUT COMPLICATION, WITH LONG-TERM CURRENT USE OF INSULIN (MULTI): ICD-10-CM

## 2024-12-06 DIAGNOSIS — E11.9 TYPE 2 DIABETES MELLITUS WITHOUT COMPLICATION, WITH LONG-TERM CURRENT USE OF INSULIN (MULTI): ICD-10-CM

## 2024-12-06 DIAGNOSIS — J20.9 ACUTE BRONCHITIS, UNSPECIFIED ORGANISM: Primary | ICD-10-CM

## 2024-12-06 PROCEDURE — 71046 X-RAY EXAM CHEST 2 VIEWS: CPT

## 2024-12-06 PROCEDURE — 99213 OFFICE O/P EST LOW 20 MIN: CPT | Performed by: PHYSICIAN ASSISTANT

## 2024-12-06 PROCEDURE — 3078F DIAST BP <80 MM HG: CPT | Performed by: PHYSICIAN ASSISTANT

## 2024-12-06 PROCEDURE — 4004F PT TOBACCO SCREEN RCVD TLK: CPT | Performed by: PHYSICIAN ASSISTANT

## 2024-12-06 PROCEDURE — 3008F BODY MASS INDEX DOCD: CPT | Performed by: PHYSICIAN ASSISTANT

## 2024-12-06 PROCEDURE — 3074F SYST BP LT 130 MM HG: CPT | Performed by: PHYSICIAN ASSISTANT

## 2024-12-06 RX ORDER — CEFUROXIME AXETIL 500 MG/1
500 TABLET ORAL 2 TIMES DAILY
Qty: 20 TABLET | Refills: 0 | Status: ON HOLD | OUTPATIENT
Start: 2024-12-06 | End: 2024-12-16

## 2024-12-06 RX ORDER — PREDNISONE 20 MG/1
40 TABLET ORAL DAILY
Qty: 10 TABLET | Refills: 0 | Status: ON HOLD | OUTPATIENT
Start: 2024-12-06 | End: 2024-12-11

## 2024-12-06 RX ORDER — ALBUTEROL SULFATE 90 UG/1
2 INHALANT RESPIRATORY (INHALATION) EVERY 6 HOURS PRN
Qty: 36 G | Refills: 2 | Status: ON HOLD | OUTPATIENT
Start: 2024-12-06

## 2024-12-06 ASSESSMENT — ENCOUNTER SYMPTOMS
RHINORRHEA: 1
SWOLLEN GLANDS: 1
HEADACHES: 1
NAUSEA: 1
SORE THROAT: 1
COUGH: 1
WHEEZING: 1
VOMITING: 0
DIABETIC ASSOCIATED SYMPTOMS: 0

## 2024-12-06 ASSESSMENT — PAIN SCALES - GENERAL: PAINLEVEL_OUTOF10: 6

## 2024-12-06 NOTE — PROGRESS NOTES
"Subjective   Patient ID: Alanna Hickman is a 44 y.o. female who presents for Diabetes (Never did labs that were ordered), Cough (With chest congestion, yellow sputum production, occasionally blood tinged. She feels wheezy, sore throat.  Body aches .  Symptoms started 10 days ago.), and Sinus Problem (Congestion with lots of sinus pressure with clear discharge.).    Diabetes  She presents for her follow-up diabetic visit. She has type 2 diabetes mellitus. Her disease course has been stable. Hypoglycemia symptoms include headaches. There are no diabetic associated symptoms.   URI   This is a new problem. The current episode started 1 to 4 weeks ago. The problem has been gradually worsening. There has been no fever. Associated symptoms include congestion, coughing, headaches, nausea, rhinorrhea, a sore throat, swollen glands and wheezing. Pertinent negatives include no vomiting.   States she has felt worse over the past several days.  Has also been having more reflux but felt her acid was possibly from infection and has not been taking her reflux medication.  She also did not get her diabetes blood work done yet.    Review of Systems   HENT:  Positive for congestion, rhinorrhea and sore throat.    Respiratory:  Positive for cough and wheezing.    Gastrointestinal:  Positive for nausea. Negative for vomiting.   Neurological:  Positive for headaches.       Objective   /70 (BP Location: Left arm, Patient Position: Sitting, BP Cuff Size: Large adult)   Pulse 86   Temp 36.2 °C (97.2 °F) (Temporal)   Ht 1.702 m (5' 7\")   Wt 107 kg (235 lb)   SpO2 94%   BMI 36.81 kg/m²     Physical Exam  Constitutional:       Appearance: She is ill-appearing.      Comments: SHe does not feel well   HENT:      Right Ear: Tympanic membrane normal.      Left Ear: Tympanic membrane normal.      Nose: Congestion present.      Mouth/Throat:      Mouth: Mucous membranes are moist.      Pharynx: Posterior oropharyngeal erythema " present.   Eyes:      Extraocular Movements: Extraocular movements intact.      Pupils: Pupils are equal, round, and reactive to light.   Cardiovascular:      Rate and Rhythm: Normal rate and regular rhythm.      Pulses: Normal pulses.   Pulmonary:      Effort: No respiratory distress.      Breath sounds: Wheezing present. No rhonchi.   Musculoskeletal:      Cervical back: Neck supple.   Neurological:      General: No focal deficit present.      Mental Status: She is alert and oriented to person, place, and time. Mental status is at baseline.         Assessment/Plan   Diagnoses and all orders for this visit:  Acute bronchitis, unspecified organism  -     XR chest 2 views; Future  -     inhalational spacing device inhaler; Use as directed with inhalers  -     cefuroxime (Ceftin) 500 mg tablet; Take 1 tablet (500 mg) by mouth 2 times a day for 10 days.  -     predniSONE (Deltasone) 20 mg tablet; Take 2 tablets (40 mg) by mouth once daily for 5 days.  -     CBC and Auto Differential; Future  Mild intermittent asthma, unspecified whether complicated ([facility])  -     albuterol (Ventolin HFA) 90 mcg/actuation inhaler; Inhale 2 puffs every 6 hours if needed for wheezing.  Type 2 diabetes mellitus without complication, with long-term current use of insulin (Multi)  Other orders  -     Follow Up In Primary Care - Established  Will treat her for bronchitis also obtain chest x-ray.  She will get her blood work done here in the next few days.  Did not go over blood sugars or anything that sort today.  She was just not feeling well.  Will follow-up pending her test results and progress.  Also informed her she needs to take her reflux medicine daily.

## 2024-12-08 ENCOUNTER — HOSPITAL ENCOUNTER (INPATIENT)
Facility: HOSPITAL | Age: 44
End: 2024-12-08
Attending: INTERNAL MEDICINE | Admitting: INTERNAL MEDICINE
Payer: COMMERCIAL

## 2024-12-08 ENCOUNTER — APPOINTMENT (OUTPATIENT)
Dept: CARDIOLOGY | Facility: HOSPITAL | Age: 44
End: 2024-12-08
Payer: COMMERCIAL

## 2024-12-08 ENCOUNTER — APPOINTMENT (OUTPATIENT)
Dept: RADIOLOGY | Facility: HOSPITAL | Age: 44
End: 2024-12-08
Payer: COMMERCIAL

## 2024-12-08 VITALS
RESPIRATION RATE: 22 BRPM | HEART RATE: 93 BPM | OXYGEN SATURATION: 92 % | TEMPERATURE: 98.4 F | SYSTOLIC BLOOD PRESSURE: 127 MMHG | HEIGHT: 67 IN | WEIGHT: 235 LBS | BODY MASS INDEX: 36.88 KG/M2 | DIASTOLIC BLOOD PRESSURE: 66 MMHG

## 2024-12-08 DIAGNOSIS — J15.9 BACTERIAL PNEUMONIA: ICD-10-CM

## 2024-12-08 DIAGNOSIS — J18.9 PNEUMONIA OF BOTH LOWER LOBES DUE TO INFECTIOUS ORGANISM: ICD-10-CM

## 2024-12-08 DIAGNOSIS — J96.01 ACUTE HYPOXEMIC RESPIRATORY FAILURE (MULTI): Primary | ICD-10-CM

## 2024-12-08 DIAGNOSIS — J20.9 ACUTE BRONCHITIS, UNSPECIFIED ORGANISM: ICD-10-CM

## 2024-12-08 PROBLEM — E11.9 DM (DIABETES MELLITUS) (MULTI): Status: ACTIVE | Noted: 2024-12-08

## 2024-12-08 PROBLEM — E86.0 DEHYDRATION: Status: ACTIVE | Noted: 2024-12-08

## 2024-12-08 PROBLEM — Z72.0 TOBACCO ABUSE: Status: ACTIVE | Noted: 2024-12-08

## 2024-12-08 PROBLEM — J96.90 RESPIRATORY FAILURE (MULTI): Status: ACTIVE | Noted: 2024-12-08

## 2024-12-08 LAB
ALBUMIN SERPL BCP-MCNC: 3.9 G/DL (ref 3.4–5)
ALP SERPL-CCNC: 81 U/L (ref 33–110)
ALT SERPL W P-5'-P-CCNC: 42 U/L (ref 7–45)
ANION GAP SERPL CALCULATED.3IONS-SCNC: 12 MMOL/L (ref 10–20)
AST SERPL W P-5'-P-CCNC: 25 U/L (ref 9–39)
BASOPHILS # BLD AUTO: 0.05 X10*3/UL (ref 0–0.1)
BASOPHILS NFR BLD AUTO: 0.6 %
BILIRUB SERPL-MCNC: 0.7 MG/DL (ref 0–1.2)
BUN SERPL-MCNC: 10 MG/DL (ref 6–23)
CALCIUM SERPL-MCNC: 9.4 MG/DL (ref 8.6–10.3)
CARDIAC TROPONIN I PNL SERPL HS: 4 NG/L (ref 0–13)
CARDIAC TROPONIN I PNL SERPL HS: 4 NG/L (ref 0–13)
CHLORIDE SERPL-SCNC: 96 MMOL/L (ref 98–107)
CO2 SERPL-SCNC: 29 MMOL/L (ref 21–32)
CREAT SERPL-MCNC: 0.59 MG/DL (ref 0.5–1.05)
EGFRCR SERPLBLD CKD-EPI 2021: >90 ML/MIN/1.73M*2
EOSINOPHIL # BLD AUTO: 0.06 X10*3/UL (ref 0–0.7)
EOSINOPHIL NFR BLD AUTO: 0.8 %
ERYTHROCYTE [DISTWIDTH] IN BLOOD BY AUTOMATED COUNT: 14.8 % (ref 11.5–14.5)
FLUAV RNA RESP QL NAA+PROBE: NOT DETECTED
FLUBV RNA RESP QL NAA+PROBE: NOT DETECTED
GLUCOSE BLD MANUAL STRIP-MCNC: 254 MG/DL (ref 74–99)
GLUCOSE SERPL-MCNC: 126 MG/DL (ref 74–99)
HCT VFR BLD AUTO: 51.8 % (ref 36–46)
HGB BLD-MCNC: 17.4 G/DL (ref 12–16)
IMM GRANULOCYTES # BLD AUTO: 0.03 X10*3/UL (ref 0–0.7)
IMM GRANULOCYTES NFR BLD AUTO: 0.4 % (ref 0–0.9)
LYMPHOCYTES # BLD AUTO: 0.83 X10*3/UL (ref 1.2–4.8)
LYMPHOCYTES NFR BLD AUTO: 10.5 %
MCH RBC QN AUTO: 31.8 PG (ref 26–34)
MCHC RBC AUTO-ENTMCNC: 33.6 G/DL (ref 32–36)
MCV RBC AUTO: 95 FL (ref 80–100)
MONOCYTES # BLD AUTO: 0.81 X10*3/UL (ref 0.1–1)
MONOCYTES NFR BLD AUTO: 10.2 %
NEUTROPHILS # BLD AUTO: 6.15 X10*3/UL (ref 1.2–7.7)
NEUTROPHILS NFR BLD AUTO: 77.5 %
NRBC BLD-RTO: 0 /100 WBCS (ref 0–0)
PLATELET # BLD AUTO: 219 X10*3/UL (ref 150–450)
POTASSIUM SERPL-SCNC: 4.2 MMOL/L (ref 3.5–5.3)
PROT SERPL-MCNC: 7.6 G/DL (ref 6.4–8.2)
RBC # BLD AUTO: 5.47 X10*6/UL (ref 4–5.2)
SARS-COV-2 RNA RESP QL NAA+PROBE: NOT DETECTED
SODIUM SERPL-SCNC: 133 MMOL/L (ref 136–145)
WBC # BLD AUTO: 7.9 X10*3/UL (ref 4.4–11.3)

## 2024-12-08 PROCEDURE — 87040 BLOOD CULTURE FOR BACTERIA: CPT | Mod: WESLAB

## 2024-12-08 PROCEDURE — 87636 SARSCOV2 & INF A&B AMP PRB: CPT

## 2024-12-08 PROCEDURE — 2500000002 HC RX 250 W HCPCS SELF ADMINISTERED DRUGS (ALT 637 FOR MEDICARE OP, ALT 636 FOR OP/ED)

## 2024-12-08 PROCEDURE — 99223 1ST HOSP IP/OBS HIGH 75: CPT | Performed by: INTERNAL MEDICINE

## 2024-12-08 PROCEDURE — 94664 DEMO&/EVAL PT USE INHALER: CPT

## 2024-12-08 PROCEDURE — 36415 COLL VENOUS BLD VENIPUNCTURE: CPT

## 2024-12-08 PROCEDURE — 94640 AIRWAY INHALATION TREATMENT: CPT

## 2024-12-08 PROCEDURE — 87641 MR-STAPH DNA AMP PROBE: CPT | Performed by: INTERNAL MEDICINE

## 2024-12-08 PROCEDURE — 86738 MYCOPLASMA ANTIBODY: CPT | Performed by: INTERNAL MEDICINE

## 2024-12-08 PROCEDURE — 84484 ASSAY OF TROPONIN QUANT: CPT

## 2024-12-08 PROCEDURE — S4991 NICOTINE PATCH NONLEGEND: HCPCS | Performed by: INTERNAL MEDICINE

## 2024-12-08 PROCEDURE — 2500000002 HC RX 250 W HCPCS SELF ADMINISTERED DRUGS (ALT 637 FOR MEDICARE OP, ALT 636 FOR OP/ED): Performed by: INTERNAL MEDICINE

## 2024-12-08 PROCEDURE — 99291 CRITICAL CARE FIRST HOUR: CPT | Mod: 25

## 2024-12-08 PROCEDURE — 2500000005 HC RX 250 GENERAL PHARMACY W/O HCPCS: Performed by: INTERNAL MEDICINE

## 2024-12-08 PROCEDURE — 1100000001 HC PRIVATE ROOM DAILY

## 2024-12-08 PROCEDURE — 2500000004 HC RX 250 GENERAL PHARMACY W/ HCPCS (ALT 636 FOR OP/ED)

## 2024-12-08 PROCEDURE — 85025 COMPLETE CBC W/AUTO DIFF WBC: CPT

## 2024-12-08 PROCEDURE — 93010 ELECTROCARDIOGRAM REPORT: CPT | Performed by: INTERNAL MEDICINE

## 2024-12-08 PROCEDURE — 87637 SARSCOV2&INF A&B&RSV AMP PRB: CPT

## 2024-12-08 PROCEDURE — 9420000001 HC RT PATIENT EDUCATION 5 MIN

## 2024-12-08 PROCEDURE — 2500000004 HC RX 250 GENERAL PHARMACY W/ HCPCS (ALT 636 FOR OP/ED): Performed by: INTERNAL MEDICINE

## 2024-12-08 PROCEDURE — 80053 COMPREHEN METABOLIC PANEL: CPT

## 2024-12-08 PROCEDURE — 93005 ELECTROCARDIOGRAM TRACING: CPT

## 2024-12-08 PROCEDURE — 2500000001 HC RX 250 WO HCPCS SELF ADMINISTERED DRUGS (ALT 637 FOR MEDICARE OP): Performed by: INTERNAL MEDICINE

## 2024-12-08 PROCEDURE — 71275 CT ANGIOGRAPHY CHEST: CPT

## 2024-12-08 PROCEDURE — 82947 ASSAY GLUCOSE BLOOD QUANT: CPT

## 2024-12-08 PROCEDURE — 71275 CT ANGIOGRAPHY CHEST: CPT | Performed by: RADIOLOGY

## 2024-12-08 PROCEDURE — 2550000001 HC RX 255 CONTRASTS

## 2024-12-08 PROCEDURE — 99285 EMERGENCY DEPT VISIT HI MDM: CPT | Mod: 25

## 2024-12-08 RX ORDER — ACETAMINOPHEN 500 MG
5 TABLET ORAL NIGHTLY PRN
Status: DISPENSED | OUTPATIENT
Start: 2024-12-08

## 2024-12-08 RX ORDER — CEFTRIAXONE 2 G/50ML
2 INJECTION, SOLUTION INTRAVENOUS ONCE
Status: COMPLETED | OUTPATIENT
Start: 2024-12-08 | End: 2024-12-08

## 2024-12-08 RX ORDER — VANCOMYCIN HYDROCHLORIDE 1 G/20ML
INJECTION, POWDER, LYOPHILIZED, FOR SOLUTION INTRAVENOUS DAILY PRN
Status: DISPENSED | OUTPATIENT
Start: 2024-12-08

## 2024-12-08 RX ORDER — IPRATROPIUM BROMIDE AND ALBUTEROL SULFATE 2.5; .5 MG/3ML; MG/3ML
3 SOLUTION RESPIRATORY (INHALATION)
Status: DISCONTINUED | OUTPATIENT
Start: 2024-12-08 | End: 2024-12-08

## 2024-12-08 RX ORDER — IPRATROPIUM BROMIDE AND ALBUTEROL SULFATE 2.5; .5 MG/3ML; MG/3ML
SOLUTION RESPIRATORY (INHALATION)
Status: COMPLETED
Start: 2024-12-08 | End: 2024-12-08

## 2024-12-08 RX ORDER — SODIUM CHLORIDE 9 MG/ML
125 INJECTION, SOLUTION INTRAVENOUS CONTINUOUS
Status: ACTIVE | OUTPATIENT
Start: 2024-12-08 | End: 2024-12-09

## 2024-12-08 RX ORDER — VANCOMYCIN 1.75 G/350ML
1250 INJECTION, SOLUTION INTRAVENOUS EVERY 12 HOURS
Status: DISPENSED | OUTPATIENT
Start: 2024-12-08

## 2024-12-08 RX ORDER — INSULIN GLARGINE 100 [IU]/ML
16 INJECTION, SOLUTION SUBCUTANEOUS NIGHTLY
Status: DISPENSED | OUTPATIENT
Start: 2024-12-08

## 2024-12-08 RX ORDER — PANTOPRAZOLE SODIUM 20 MG/1
20 TABLET, DELAYED RELEASE ORAL
Status: ACTIVE | OUTPATIENT
Start: 2024-12-09

## 2024-12-08 RX ORDER — ENOXAPARIN SODIUM 100 MG/ML
40 INJECTION SUBCUTANEOUS EVERY 24 HOURS
Status: DISPENSED | OUTPATIENT
Start: 2024-12-08

## 2024-12-08 RX ORDER — DAPAGLIFLOZIN 10 MG/1
10 TABLET, FILM COATED ORAL DAILY
Status: DISPENSED | OUTPATIENT
Start: 2024-12-09

## 2024-12-08 RX ORDER — ACETAMINOPHEN 160 MG/5ML
650 SOLUTION ORAL EVERY 4 HOURS PRN
Status: ACTIVE | OUTPATIENT
Start: 2024-12-08

## 2024-12-08 RX ORDER — ACETAMINOPHEN 650 MG/1
650 SUPPOSITORY RECTAL EVERY 4 HOURS PRN
Status: ACTIVE | OUTPATIENT
Start: 2024-12-08

## 2024-12-08 RX ORDER — IPRATROPIUM BROMIDE AND ALBUTEROL SULFATE 2.5; .5 MG/3ML; MG/3ML
3 SOLUTION RESPIRATORY (INHALATION)
Status: ACTIVE | OUTPATIENT
Start: 2024-12-09

## 2024-12-08 RX ORDER — ONDANSETRON HYDROCHLORIDE 2 MG/ML
4 INJECTION, SOLUTION INTRAVENOUS EVERY 8 HOURS PRN
Status: ACTIVE | OUTPATIENT
Start: 2024-12-08

## 2024-12-08 RX ORDER — IBUPROFEN 200 MG
1 TABLET ORAL
Status: DISPENSED | OUTPATIENT
Start: 2024-12-08

## 2024-12-08 RX ORDER — ACETAMINOPHEN 325 MG/1
650 TABLET ORAL EVERY 4 HOURS PRN
Status: ACTIVE | OUTPATIENT
Start: 2024-12-08

## 2024-12-08 RX ORDER — INSULIN DEGLUDEC 100 U/ML
20 INJECTION, SOLUTION SUBCUTANEOUS NIGHTLY
Status: DISCONTINUED | OUTPATIENT
Start: 2024-12-08 | End: 2024-12-08

## 2024-12-08 RX ORDER — IPRATROPIUM BROMIDE AND ALBUTEROL SULFATE 2.5; .5 MG/3ML; MG/3ML
3 SOLUTION RESPIRATORY (INHALATION) EVERY 2 HOUR PRN
Status: DISPENSED | OUTPATIENT
Start: 2024-12-08

## 2024-12-08 RX ORDER — CLOPIDOGREL BISULFATE 75 MG/1
75 TABLET ORAL DAILY
Status: ACTIVE | OUTPATIENT
Start: 2024-12-08

## 2024-12-08 RX ORDER — INSULIN LISPRO 100 [IU]/ML
0-10 INJECTION, SOLUTION INTRAVENOUS; SUBCUTANEOUS
Status: DISPENSED | OUTPATIENT
Start: 2024-12-09

## 2024-12-08 RX ORDER — ONDANSETRON 4 MG/1
4 TABLET, ORALLY DISINTEGRATING ORAL EVERY 8 HOURS PRN
Status: ACTIVE | OUTPATIENT
Start: 2024-12-08

## 2024-12-08 RX ADMIN — INSULIN GLARGINE 16 UNITS: 100 INJECTION, SOLUTION SUBCUTANEOUS at 22:36

## 2024-12-08 RX ADMIN — IPRATROPIUM BROMIDE AND ALBUTEROL SULFATE 3 ML: 2.5; .5 SOLUTION RESPIRATORY (INHALATION) at 20:53

## 2024-12-08 RX ADMIN — IOHEXOL 75 ML: 350 INJECTION, SOLUTION INTRAVENOUS at 18:30

## 2024-12-08 RX ADMIN — ENOXAPARIN SODIUM 40 MG: 40 INJECTION SUBCUTANEOUS at 22:14

## 2024-12-08 RX ADMIN — Medication 5 MG: at 22:14

## 2024-12-08 RX ADMIN — DOXYCYCLINE 100 MG: 100 INJECTION, POWDER, LYOPHILIZED, FOR SOLUTION INTRAVENOUS at 20:11

## 2024-12-08 RX ADMIN — NICOTINE 1 PATCH: 21 PATCH, EXTENDED RELEASE TRANSDERMAL at 22:14

## 2024-12-08 RX ADMIN — Medication 4 L/MIN: at 22:55

## 2024-12-08 RX ADMIN — METHYLPREDNISOLONE SODIUM SUCCINATE 40 MG: 40 INJECTION, POWDER, FOR SOLUTION INTRAMUSCULAR; INTRAVENOUS at 22:14

## 2024-12-08 RX ADMIN — BENZOCAINE AND MENTHOL 1 LOZENGE: 15; 3.6 LOZENGE ORAL at 23:43

## 2024-12-08 RX ADMIN — PIPERACILLIN SODIUM AND TAZOBACTAM SODIUM 3.38 G: 3; .375 INJECTION, SOLUTION INTRAVENOUS at 23:34

## 2024-12-08 RX ADMIN — CEFTRIAXONE SODIUM 2 G: 2 INJECTION, SOLUTION INTRAVENOUS at 20:11

## 2024-12-08 RX ADMIN — SODIUM CHLORIDE 1000 ML: 900 INJECTION, SOLUTION INTRAVENOUS at 22:15

## 2024-12-08 RX ADMIN — SODIUM CHLORIDE 125 ML/HR: 900 INJECTION, SOLUTION INTRAVENOUS at 23:25

## 2024-12-08 RX ADMIN — IPRATROPIUM BROMIDE AND ALBUTEROL SULFATE 3 ML: 2.5; .5 SOLUTION RESPIRATORY (INHALATION) at 22:55

## 2024-12-08 SDOH — HEALTH STABILITY: PHYSICAL HEALTH
HOW OFTEN DO YOU NEED TO HAVE SOMEONE HELP YOU WHEN YOU READ INSTRUCTIONS, PAMPHLETS, OR OTHER WRITTEN MATERIAL FROM YOUR DOCTOR OR PHARMACY?: NEVER

## 2024-12-08 SDOH — SOCIAL STABILITY: SOCIAL INSECURITY: WITHIN THE LAST YEAR, HAVE YOU BEEN AFRAID OF YOUR PARTNER OR EX-PARTNER?: NO

## 2024-12-08 SDOH — HEALTH STABILITY: MENTAL HEALTH: HOW OFTEN DO YOU HAVE SIX OR MORE DRINKS ON ONE OCCASION?: NEVER

## 2024-12-08 SDOH — SOCIAL STABILITY: SOCIAL NETWORK
DO YOU BELONG TO ANY CLUBS OR ORGANIZATIONS SUCH AS CHURCH GROUPS, UNIONS, FRATERNAL OR ATHLETIC GROUPS, OR SCHOOL GROUPS?: NO

## 2024-12-08 SDOH — SOCIAL STABILITY: SOCIAL INSECURITY: DO YOU FEEL ANYONE HAS EXPLOITED OR TAKEN ADVANTAGE OF YOU FINANCIALLY OR OF YOUR PERSONAL PROPERTY?: NO

## 2024-12-08 SDOH — SOCIAL STABILITY: SOCIAL INSECURITY: HAS ANYONE EVER THREATENED TO HURT YOUR FAMILY OR YOUR PETS?: NO

## 2024-12-08 SDOH — ECONOMIC STABILITY: FOOD INSECURITY: HOW HARD IS IT FOR YOU TO PAY FOR THE VERY BASICS LIKE FOOD, HOUSING, MEDICAL CARE, AND HEATING?: NOT HARD AT ALL

## 2024-12-08 SDOH — ECONOMIC STABILITY: FOOD INSECURITY: WITHIN THE PAST 12 MONTHS, YOU WORRIED THAT YOUR FOOD WOULD RUN OUT BEFORE YOU GOT THE MONEY TO BUY MORE.: NEVER TRUE

## 2024-12-08 SDOH — ECONOMIC STABILITY: HOUSING INSECURITY: AT ANY TIME IN THE PAST 12 MONTHS, WERE YOU HOMELESS OR LIVING IN A SHELTER (INCLUDING NOW)?: NO

## 2024-12-08 SDOH — HEALTH STABILITY: MENTAL HEALTH
DO YOU FEEL STRESS - TENSE, RESTLESS, NERVOUS, OR ANXIOUS, OR UNABLE TO SLEEP AT NIGHT BECAUSE YOUR MIND IS TROUBLED ALL THE TIME - THESE DAYS?: NOT AT ALL

## 2024-12-08 SDOH — ECONOMIC STABILITY: INCOME INSECURITY: IN THE PAST 12 MONTHS HAS THE ELECTRIC, GAS, OIL, OR WATER COMPANY THREATENED TO SHUT OFF SERVICES IN YOUR HOME?: NO

## 2024-12-08 SDOH — ECONOMIC STABILITY: FOOD INSECURITY: WITHIN THE PAST 12 MONTHS, THE FOOD YOU BOUGHT JUST DIDN'T LAST AND YOU DIDN'T HAVE MONEY TO GET MORE.: NEVER TRUE

## 2024-12-08 SDOH — SOCIAL STABILITY: SOCIAL INSECURITY: ARE YOU MARRIED, WIDOWED, DIVORCED, SEPARATED, NEVER MARRIED, OR LIVING WITH A PARTNER?: NEVER MARRIED

## 2024-12-08 SDOH — SOCIAL STABILITY: SOCIAL INSECURITY: ARE THERE ANY APPARENT SIGNS OF INJURIES/BEHAVIORS THAT COULD BE RELATED TO ABUSE/NEGLECT?: NO

## 2024-12-08 SDOH — SOCIAL STABILITY: SOCIAL INSECURITY: WITHIN THE LAST YEAR, HAVE YOU BEEN HUMILIATED OR EMOTIONALLY ABUSED IN OTHER WAYS BY YOUR PARTNER OR EX-PARTNER?: NO

## 2024-12-08 SDOH — SOCIAL STABILITY: SOCIAL NETWORK: IN A TYPICAL WEEK, HOW MANY TIMES DO YOU TALK ON THE PHONE WITH FAMILY, FRIENDS, OR NEIGHBORS?: THREE TIMES A WEEK

## 2024-12-08 SDOH — SOCIAL STABILITY: SOCIAL INSECURITY
WITHIN THE LAST YEAR, HAVE YOU BEEN KICKED, HIT, SLAPPED, OR OTHERWISE PHYSICALLY HURT BY YOUR PARTNER OR EX-PARTNER?: NO

## 2024-12-08 SDOH — ECONOMIC STABILITY: HOUSING INSECURITY: IN THE PAST 12 MONTHS, HOW MANY TIMES HAVE YOU MOVED WHERE YOU WERE LIVING?: 0

## 2024-12-08 SDOH — HEALTH STABILITY: MENTAL HEALTH: HOW OFTEN DO YOU HAVE A DRINK CONTAINING ALCOHOL?: NEVER

## 2024-12-08 SDOH — SOCIAL STABILITY: SOCIAL INSECURITY: ABUSE: ADULT

## 2024-12-08 SDOH — SOCIAL STABILITY: SOCIAL INSECURITY
WITHIN THE LAST YEAR, HAVE YOU BEEN RAPED OR FORCED TO HAVE ANY KIND OF SEXUAL ACTIVITY BY YOUR PARTNER OR EX-PARTNER?: NO

## 2024-12-08 SDOH — SOCIAL STABILITY: SOCIAL INSECURITY: WERE YOU ABLE TO COMPLETE ALL THE BEHAVIORAL HEALTH SCREENINGS?: YES

## 2024-12-08 SDOH — SOCIAL STABILITY: SOCIAL NETWORK: HOW OFTEN DO YOU ATTEND CHURCH OR RELIGIOUS SERVICES?: NEVER

## 2024-12-08 SDOH — SOCIAL STABILITY: SOCIAL NETWORK: HOW OFTEN DO YOU ATTEND MEETINGS OF THE CLUBS OR ORGANIZATIONS YOU BELONG TO?: NEVER

## 2024-12-08 SDOH — SOCIAL STABILITY: SOCIAL INSECURITY: HAVE YOU HAD ANY THOUGHTS OF HARMING ANYONE ELSE?: NO

## 2024-12-08 SDOH — ECONOMIC STABILITY: TRANSPORTATION INSECURITY: IN THE PAST 12 MONTHS, HAS LACK OF TRANSPORTATION KEPT YOU FROM MEDICAL APPOINTMENTS OR FROM GETTING MEDICATIONS?: NO

## 2024-12-08 SDOH — SOCIAL STABILITY: SOCIAL INSECURITY: DO YOU FEEL UNSAFE GOING BACK TO THE PLACE WHERE YOU ARE LIVING?: NO

## 2024-12-08 SDOH — HEALTH STABILITY: MENTAL HEALTH: HOW MANY DRINKS CONTAINING ALCOHOL DO YOU HAVE ON A TYPICAL DAY WHEN YOU ARE DRINKING?: PATIENT DOES NOT DRINK

## 2024-12-08 SDOH — SOCIAL STABILITY: SOCIAL INSECURITY: HAVE YOU HAD THOUGHTS OF HARMING ANYONE ELSE?: NO

## 2024-12-08 SDOH — HEALTH STABILITY: PHYSICAL HEALTH: ON AVERAGE, HOW MANY DAYS PER WEEK DO YOU ENGAGE IN MODERATE TO STRENUOUS EXERCISE (LIKE A BRISK WALK)?: 0 DAYS

## 2024-12-08 SDOH — ECONOMIC STABILITY: HOUSING INSECURITY: IN THE LAST 12 MONTHS, WAS THERE A TIME WHEN YOU WERE NOT ABLE TO PAY THE MORTGAGE OR RENT ON TIME?: NO

## 2024-12-08 SDOH — SOCIAL STABILITY: SOCIAL INSECURITY: DOES ANYONE TRY TO KEEP YOU FROM HAVING/CONTACTING OTHER FRIENDS OR DOING THINGS OUTSIDE YOUR HOME?: NO

## 2024-12-08 SDOH — HEALTH STABILITY: PHYSICAL HEALTH: ON AVERAGE, HOW MANY MINUTES DO YOU ENGAGE IN EXERCISE AT THIS LEVEL?: 0 MIN

## 2024-12-08 SDOH — SOCIAL STABILITY: SOCIAL NETWORK: HOW OFTEN DO YOU GET TOGETHER WITH FRIENDS OR RELATIVES?: THREE TIMES A WEEK

## 2024-12-08 SDOH — SOCIAL STABILITY: SOCIAL INSECURITY: ARE YOU OR HAVE YOU BEEN THREATENED OR ABUSED PHYSICALLY, EMOTIONALLY, OR SEXUALLY BY ANYONE?: NO

## 2024-12-08 ASSESSMENT — LIFESTYLE VARIABLES
EVER HAD A DRINK FIRST THING IN THE MORNING TO STEADY YOUR NERVES TO GET RID OF A HANGOVER: NO
HOW MANY STANDARD DRINKS CONTAINING ALCOHOL DO YOU HAVE ON A TYPICAL DAY: PATIENT DOES NOT DRINK
AUDIT-C TOTAL SCORE: 0
EVER FELT BAD OR GUILTY ABOUT YOUR DRINKING: NO
PRESCIPTION_ABUSE_PAST_12_MONTHS: NO
SKIP TO QUESTIONS 9-10: 1
HAVE YOU EVER FELT YOU SHOULD CUT DOWN ON YOUR DRINKING: NO
AUDIT-C TOTAL SCORE: 0
TOTAL SCORE: 0
SKIP TO QUESTIONS 9-10: 1
SUBSTANCE_ABUSE_PAST_12_MONTHS: NO
HOW OFTEN DO YOU HAVE 6 OR MORE DRINKS ON ONE OCCASION: NEVER
HOW OFTEN DO YOU HAVE A DRINK CONTAINING ALCOHOL: NEVER
AUDIT-C TOTAL SCORE: 0
HAVE PEOPLE ANNOYED YOU BY CRITICIZING YOUR DRINKING: NO

## 2024-12-08 ASSESSMENT — COLUMBIA-SUICIDE SEVERITY RATING SCALE - C-SSRS
1. IN THE PAST MONTH, HAVE YOU WISHED YOU WERE DEAD OR WISHED YOU COULD GO TO SLEEP AND NOT WAKE UP?: NO
6. HAVE YOU EVER DONE ANYTHING, STARTED TO DO ANYTHING, OR PREPARED TO DO ANYTHING TO END YOUR LIFE?: NO
2. HAVE YOU ACTUALLY HAD ANY THOUGHTS OF KILLING YOURSELF?: NO

## 2024-12-08 ASSESSMENT — ACTIVITIES OF DAILY LIVING (ADL)
DRESSING YOURSELF: INDEPENDENT
GROOMING: INDEPENDENT
WALKS IN HOME: INDEPENDENT
BATHING: INDEPENDENT
HEARING - LEFT EAR: FUNCTIONAL
PATIENT'S MEMORY ADEQUATE TO SAFELY COMPLETE DAILY ACTIVITIES?: YES
TOILETING: INDEPENDENT
JUDGMENT_ADEQUATE_SAFELY_COMPLETE_DAILY_ACTIVITIES: YES
FEEDING YOURSELF: INDEPENDENT
ASSISTIVE_DEVICE: WALKER
HEARING - RIGHT EAR: FUNCTIONAL
LACK_OF_TRANSPORTATION: NO
ADEQUATE_TO_COMPLETE_ADL: YES

## 2024-12-08 ASSESSMENT — PAIN DESCRIPTION - PAIN TYPE: TYPE: ACUTE PAIN

## 2024-12-08 ASSESSMENT — ENCOUNTER SYMPTOMS
APPETITE CHANGE: 1
CHILLS: 1
ACTIVITY CHANGE: 1
FATIGUE: 1

## 2024-12-08 ASSESSMENT — COGNITIVE AND FUNCTIONAL STATUS - GENERAL
MOBILITY SCORE: 24
PATIENT BASELINE BEDBOUND: NO
DAILY ACTIVITIY SCORE: 24

## 2024-12-08 ASSESSMENT — PAIN SCALES - GENERAL
PAINLEVEL_OUTOF10: 5 - MODERATE PAIN
PAINLEVEL_OUTOF10: 6
PAINLEVEL_OUTOF10: 6

## 2024-12-08 ASSESSMENT — PAIN - FUNCTIONAL ASSESSMENT
PAIN_FUNCTIONAL_ASSESSMENT: 0-10
PAIN_FUNCTIONAL_ASSESSMENT: 0-10

## 2024-12-08 ASSESSMENT — PAIN DESCRIPTION - DESCRIPTORS: DESCRIPTORS: ACHING

## 2024-12-08 ASSESSMENT — PAIN DESCRIPTION - LOCATION: LOCATION: CHEST

## 2024-12-08 NOTE — ED PROVIDER NOTES
HPI   Chief Complaint   Patient presents with    Shortness of Breath       Patient is a 44-year old female recently diagnosed with pneumonia being on outpatient antibiotics presenting via EMS for shortness of breath.  She states that that she is getting progressively worsening shortness of breath.  Per EMS, patient was 86% on room air.  She states that she does not wear oxygen at home.  Denies any recent surgeries.  Denies recent travel.  States she is never on blood thinners.  Denies a history of PEs or DVTs.  States that she is having yellow mucus producing cough that is been persistent.  Patient was on Augmentin 1 month ago and then several days was on cefuroxime.  Does endorse mild chest pain while coughing.  Patient denies fevers, chills, cough, sore throat, runny nose, abdominal pain, nausea, vomiting, diarrhea or urinary complaints.              Patient History   Past Medical History:   Diagnosis Date    Bilateral hand pain     Type 2 diabetes mellitus      Past Surgical History:   Procedure Laterality Date    MR HEAD ANGIO WO IV CONTRAST  6/16/2022    MR HEAD ANGIO WO IV CONTRAST [facility]     Family History   Problem Relation Name Age of Onset    Arthritis Mother      Hypertension Mother      Other (other conditons influening health) Mother      Heart failure Father      Heart attack Father      Diabetes type II Father      Other (severe back pain) Sister      Other (?OD) Sister          uncertain    Coronary artery disease Brother      Heart attack Brother      Other (back pain) Brother      Diabetes type II Brother      Other (bladder cancer) Brother      Other (Hunchback arthritis) Paternal Grandmother      Breast cancer Mother's Sister       Social History     Tobacco Use    Smoking status: Every Day     Current packs/day: 1.00     Average packs/day: 1 pack/day for 25.5 years (25.5 ttl pk-yrs)     Types: Cigarettes     Start date: 1/1/2000     Passive exposure: Current    Smokeless tobacco:  Never   Vaping Use    Vaping status: Never Used   Substance Use Topics    Alcohol use: Not Currently    Drug use: Never       Physical Exam   ED Triage Vitals [12/08/24 1805]   Temperature Heart Rate Respirations BP   36.6 °C (97.9 °F) 89 20 119/90      Pulse Ox Temp src Heart Rate Source Patient Position   (!) 89 % -- -- --      BP Location FiO2 (%)     -- --       Physical Exam  Vitals and nursing note reviewed.   Constitutional:       Appearance: She is well-developed.      Comments: Awake, laying in examination bed   HENT:      Head: Normocephalic and atraumatic.      Nose: Nose normal.      Mouth/Throat:      Mouth: Mucous membranes are moist.      Pharynx: Oropharynx is clear.   Eyes:      Extraocular Movements: Extraocular movements intact.      Conjunctiva/sclera: Conjunctivae normal.      Pupils: Pupils are equal, round, and reactive to light.   Cardiovascular:      Rate and Rhythm: Normal rate and regular rhythm.      Pulses: Normal pulses.      Heart sounds: Normal heart sounds. No murmur heard.  Pulmonary:      Effort: Pulmonary effort is normal. No respiratory distress.      Breath sounds: Examination of the right-middle field reveals rhonchi. Examination of the left-middle field reveals rhonchi. Rhonchi present.      Comments: Requiring nasal cannula at this time  Abdominal:      General: Abdomen is flat.      Palpations: Abdomen is soft.      Tenderness: There is no abdominal tenderness.   Musculoskeletal:         General: No swelling. Normal range of motion.      Cervical back: Normal range of motion and neck supple.   Skin:     General: Skin is warm and dry.      Capillary Refill: Capillary refill takes less than 2 seconds.   Neurological:      General: No focal deficit present.      Mental Status: She is alert and oriented to person, place, and time.   Psychiatric:         Mood and Affect: Mood normal.         Behavior: Behavior normal.         ED Course & MDM   ED Course as of 12/09/24 0104   Sun  Dec 08, 2024   1810 EKG Time:1809  EKG Interpretation time:1810  EKG Interpretation: EKG shows normal sinus rhythm with a rate of 90 bpm, left axis deviation, QTc 462, no evidence of STEMI.    EKG was interpreted by myself independently [JL]   1956 I spoke with admitting provider, Dr. Tomlinson.  After discussion, he is requesting that we get cultures while patient is still in the emergency department.  We did discuss antibiotic choices and he is agreeable to the antibiotics ordered at this time.  Patient will be admitted RNF. [AR]      ED Course User Index  [AR] Royce Freeman PA-C  [JL] Rocky Miles,          Diagnoses as of 12/09/24 0104   Acute hypoxemic respiratory failure (Multi)   Pneumonia of both lower lobes due to infectious organism                 No data recorded     Brian Coma Scale Score: 15 (12/08/24 1808 : Allie Keller LPN)                           Medical Decision Making  Patient is a 44-year old female recently diagnosed with pneumonia presenting with shortness of breath via EMS.  Lab work, viral swabs, imaging ordered.  Conditions considered include but are not limited: Pneumonia, PE, failure of outpatient antibiotics, viral illness.    Attending physician was available for consultation on this patient.  Patient hypoxic and requiring nasal cannula which she does not typically need at home.  Lab work is unremarkable.  CT does reveal bilateral multifocal pneumonia.  It does not reveal a PE.  Antibiotics ordered.  Viral swabs unremarkable.  I spoke with admitting provider as described above.  As I deemed necessary from the patient's history, physical, laboratory and imaging findings as well as ED course, I considered the above listed diagnoses.    Portions of this note made with Dragon software, please be mindful of potential grammatical errors.        Medications   clopidogrel (Plavix) tablet 75 mg (75 mg oral Not Given 12/8/24 2215)   dapagliflozin propanediol (Farxiga) tablet 10  mg (has no administration in time range)   nicotine (Nicoderm CQ) 21 mg/24 hr patch 1 patch (1 patch transdermal Medication Applied 12/8/24 2214)   pantoprazole (ProtoNix) EC tablet 20 mg (has no administration in time range)   enoxaparin (Lovenox) syringe 40 mg (40 mg subcutaneous Given 12/8/24 2214)   sodium chloride 0.9% infusion (125 mL/hr intravenous Rate/Dose Verify 12/9/24 0025)   acetaminophen (Tylenol) tablet 650 mg (has no administration in time range)     Or   acetaminophen (Tylenol) oral liquid 650 mg (has no administration in time range)     Or   acetaminophen (Tylenol) suppository 650 mg (has no administration in time range)   melatonin tablet 5 mg (5 mg oral Given 12/8/24 2214)   benzocaine-menthol (Cepastat Sore Throat) lozenge 1 lozenge (1 lozenge Mouth/Throat Given 12/8/24 2343)   ondansetron ODT (Zofran-ODT) disintegrating tablet 4 mg (has no administration in time range)     Or   ondansetron (Zofran) injection 4 mg (has no administration in time range)   piperacillin-tazobactam (Zosyn) 3.375 g in dextrose (iso) IV 50 mL (0 g intravenous Stopped 12/9/24 0004)   azithromycin 500 mg in dextrose 5% 250 mL IV (has no administration in time range)   vancomycin (Vancocin) pharmacy to dose - pharmacy monitoring (has no administration in time range)   ipratropium-albuteroL (Duo-Neb) 0.5-2.5 mg/3 mL nebulizer solution 3 mL (3 mL nebulization Given 12/8/24 2255)   methylPREDNISolone sod succinate (SOLU-Medrol) 40 mg/mL injection 40 mg (40 mg intravenous Given 12/8/24 2214)   insulin glargine (Lantus) injection 16 Units (16 Units subcutaneous Given 12/8/24 2236)   insulin lispro injection 0-10 Units (has no administration in time range)   vancomycin (Xellia) 1,250 mg in diluent combination  mL (1,250 mg intravenous New Bag 12/9/24 0012)   ipratropium-albuteroL (Duo-Neb) 0.5-2.5 mg/3 mL nebulizer solution 3 mL (has no administration in time range)   oxygen (O2) therapy (4 L/min inhalation Start 12/8/24  9815)   iohexol (OMNIPaque) 350 mg iodine/mL solution 75 mL (75 mL intravenous Given 12/8/24 1830)   doxycycline (Vibramycin) 100 mg in dextrose 5%  mL (0 mg intravenous Stopped 12/8/24 2150)   cefTRIAXone (Rocephin) 2 g in dextrose (iso) IV 50 mL (0 g intravenous Stopped 12/8/24 2150)   sodium chloride 0.9 % bolus 1,000 mL (0 mL intravenous Stopped 12/8/24 2315)       Labs Reviewed   COMPREHENSIVE METABOLIC PANEL - Abnormal       Result Value    Glucose 126 (*)     Sodium 133 (*)     Potassium 4.2      Chloride 96 (*)     Bicarbonate 29      Anion Gap 12      Urea Nitrogen 10      Creatinine 0.59      eGFR >90      Calcium 9.4      Albumin 3.9      Alkaline Phosphatase 81      Total Protein 7.6      AST 25      Bilirubin, Total 0.7      ALT 42     CBC WITH AUTO DIFFERENTIAL - Abnormal    WBC 7.9      nRBC 0.0      RBC 5.47 (*)     Hemoglobin 17.4 (*)     Hematocrit 51.8 (*)     MCV 95      MCH 31.8      MCHC 33.6      RDW 14.8 (*)     Platelets 219      Neutrophils % 77.5      Immature Granulocytes %, Automated 0.4      Lymphocytes % 10.5      Monocytes % 10.2      Eosinophils % 0.8      Basophils % 0.6      Neutrophils Absolute 6.15      Immature Granulocytes Absolute, Automated 0.03      Lymphocytes Absolute 0.83 (*)     Monocytes Absolute 0.81      Eosinophils Absolute 0.06      Basophils Absolute 0.05     POCT GLUCOSE - Abnormal    POCT Glucose 254 (*)    MRSA SURVEILLANCE FOR VANCOMYCIN DE-ESCALATION, PCR - Normal    MRSA PCR Not Detected      Narrative:     This assay is an FDA-approved in vitro diagnostic nucleic acid amplification test for the detection of methicillin-resistant Staphylococcus aureus (MRSA) DNA directly from nasal swabs in patients at risk for nasal colonization. MRSA NxG is intended to aid in the prevention and control of MRSA infections in healthcare settings. This assay is NOT intended to diagnose, guide, or monitor treatment for MRSA infections, or provide results of susceptibility  to methicillin. A negative result does not preclude MRSA nasal colonization. Test performance has not been evaluated in patients less than two years of age.   SARS-COV-2 AND INFLUENZA A/B PCR - Normal    Flu A Result Not Detected      Flu B Result Not Detected      Coronavirus 2019, PCR Not Detected      Narrative:     This assay has received FDA Emergency Use Authorization (EUA) and  is only authorized for the duration of time that circumstances exist to justify the authorization of the emergency use of in vitro diagnostic tests for the detection of SARS-CoV-2 virus and/or diagnosis of COVID-19 infection under section 564(b)(1) of the Act, 21 U.S.C. 360bbb-3(b)(1). Testing for SARS-CoV-2 is only recommended for patients who meet current clinical and/or epidemiological criteria as defined by federal, state, or local public health directives. This assay is an in vitro diagnostic nucleic acid amplification test for the qualitative detection of SARS-CoV-2, Influenza A, and Influenza B from nasopharyngeal specimens and has been validated for use at [facility]. Negative results do not preclude COVID-19 infections or Influenza A/B infections, and should not be used as the sole basis for diagnosis, treatment, or other management decisions. If Influenza A/B and RSV PCR results are negative, testing for Parainfluenza virus, Adenovirus and Metapneumovirus is routinely performed for [facility] pediatric oncology and intensive care inpatients, and is available on other patients by placing an add-on request.    SERIAL TROPONIN-INITIAL - Normal    Troponin I, High Sensitivity 4      Narrative:     Less than 99th percentile of normal range cutoff-  Female and children under 18 years old <14 ng/L; Male <21 ng/L: Negative  Repeat testing should be performed if clinically indicated.     Female and children under 18 years old 14-50 ng/L; Male 21-50 ng/L:  Consistent with possible cardiac damage and possible increased  clinical   risk. Serial measurements may help to assess extent of myocardial damage.     >50 ng/L: Consistent with cardiac damage, increased clinical risk and  myocardial infarction. Serial measurements may help assess extent of   myocardial damage.      NOTE: Children less than 1 year old may have higher baseline troponin   levels and results should be interpreted in conjunction with the overall   clinical context.     NOTE: Troponin I testing is performed using a different   testing methodology at [facility] than at other   [facility]. Direct result comparisons should only   be made within the same method.   SERIAL TROPONIN, 1 HOUR - Normal    Troponin I, High Sensitivity 4      Narrative:     Less than 99th percentile of normal range cutoff-  Female and children under 18 years old <14 ng/L; Male <21 ng/L: Negative  Repeat testing should be performed if clinically indicated.     Female and children under 18 years old 14-50 ng/L; Male 21-50 ng/L:  Consistent with possible cardiac damage and possible increased clinical   risk. Serial measurements may help to assess extent of myocardial damage.     >50 ng/L: Consistent with cardiac damage, increased clinical risk and  myocardial infarction. Serial measurements may help assess extent of   myocardial damage.      NOTE: Children less than 1 year old may have higher baseline troponin   levels and results should be interpreted in conjunction with the overall   clinical context.     NOTE: Troponin I testing is performed using a different   testing methodology at [facility] than at other   [facility]. Direct result comparisons should only   be made within the same method.   BLOOD CULTURE   BLOOD CULTURE   STREPTOCOCCUS PNEUMONIAE ANTIGEN, URINE   LEGIONELLA ANTIGEN, URINE   TROPONIN SERIES- (INITIAL, 1 HR)    Narrative:     The following orders were created for panel order Troponin I Series, High Sensitivity (0, 1 HR).  Procedure                                Abnormality         Status                     ---------                               -----------         ------                     Troponin I, High Sensiti...[[id number]]  Normal              Final result               Troponin, High Sensitivi...[[id number]]  Normal              Final result                 Please view results for these tests on the individual orders.   BASIC METABOLIC PANEL   CBC   MYCOPLASMA PNEUMONIAE ANTIBODY, IGM   VANCOMYCIN   POCT GLUCOSE METER   POCT GLUCOSE METER   POCT GLUCOSE METER   POCT GLUCOSE METER   POCT GLUCOSE METER         CT angio chest for pulmonary embolism   Final Result   1. Allowing for limitations of bolus timing, no evidence of pulmonary   embolism.   2. Apparent worsening of diffuse bilateral tree-in-bud type   infiltrates compatible with multifocal pneumonia. No pneumothorax or   pleural effusion. Adenopathy in the mediastinum presumed reactive        MACRO:   None.        Signed by: Kd Johnson 12/8/2024 7:19 PM   Dictation workstation:   [id number]            Procedure  Critical Care    Performed by: Royce Freeman PA-C  Authorized by: Royce Freeman PA-C    Critical care provider statement:     Critical care time (minutes):  31    Critical care time was exclusive of:  Separately billable procedures and treating other patients    Critical care was necessary to treat or prevent imminent or life-threatening deterioration of the following conditions:  Respiratory failure    Critical care was time spent personally by me on the following activities:  Blood draw for specimens, development of treatment plan with patient or surrogate, evaluation of patient's response to treatment, obtaining history from patient or surrogate, ordering and review of laboratory studies, ordering and review of radiographic studies, pulse oximetry, re-evaluation of patient's condition and review of old charts    Care discussed with: admitting provider          Royce Freeman PA-C  12/09/24 0106       Royce Freeman PA-C  12/09/24 0106

## 2024-12-08 NOTE — ED TRIAGE NOTES
Pt recently dx with pneumonia and on antibiotics since Friday. Pt states she is not getting any better. Pt was slightly hypoxic upon arrival. Pt placed on 2L NC.

## 2024-12-09 ENCOUNTER — APPOINTMENT (OUTPATIENT)
Dept: CARDIOLOGY | Facility: HOSPITAL | Age: 44
End: 2024-12-09
Payer: COMMERCIAL

## 2024-12-09 LAB
ANION GAP SERPL CALCULATED.3IONS-SCNC: 13 MMOL/L (ref 10–20)
ATRIAL RATE: 90 BPM
BUN SERPL-MCNC: 9 MG/DL (ref 6–23)
CALCIUM SERPL-MCNC: 8.3 MG/DL (ref 8.6–10.3)
CHLORIDE SERPL-SCNC: 98 MMOL/L (ref 98–107)
CO2 SERPL-SCNC: 24 MMOL/L (ref 21–32)
CREAT SERPL-MCNC: 0.45 MG/DL (ref 0.5–1.05)
EGFRCR SERPLBLD CKD-EPI 2021: >90 ML/MIN/1.73M*2
ERYTHROCYTE [DISTWIDTH] IN BLOOD BY AUTOMATED COUNT: 14.6 % (ref 11.5–14.5)
GLUCOSE BLD MANUAL STRIP-MCNC: 220 MG/DL (ref 74–99)
GLUCOSE BLD MANUAL STRIP-MCNC: 246 MG/DL (ref 74–99)
GLUCOSE BLD MANUAL STRIP-MCNC: 313 MG/DL (ref 74–99)
GLUCOSE BLD MANUAL STRIP-MCNC: 362 MG/DL (ref 74–99)
GLUCOSE SERPL-MCNC: 185 MG/DL (ref 74–99)
HCT VFR BLD AUTO: 43.6 % (ref 36–46)
HGB BLD-MCNC: 14.8 G/DL (ref 12–16)
HOLD SPECIMEN: NORMAL
LEGIONELLA AG UR QL: NEGATIVE
MCH RBC QN AUTO: 31.8 PG (ref 26–34)
MCHC RBC AUTO-ENTMCNC: 33.9 G/DL (ref 32–36)
MCV RBC AUTO: 94 FL (ref 80–100)
MRSA DNA SPEC QL NAA+PROBE: NOT DETECTED
NRBC BLD-RTO: 0 /100 WBCS (ref 0–0)
P AXIS: 51 DEGREES
P OFFSET: 186 MS
P ONSET: 139 MS
PLATELET # BLD AUTO: 180 X10*3/UL (ref 150–450)
POTASSIUM SERPL-SCNC: 4.1 MMOL/L (ref 3.5–5.3)
PR INTERVAL: 152 MS
Q ONSET: 215 MS
QRS COUNT: 15 BEATS
QRS DURATION: 74 MS
QT INTERVAL: 378 MS
QTC CALCULATION(BAZETT): 462 MS
QTC FREDERICIA: 432 MS
R AXIS: -49 DEGREES
RBC # BLD AUTO: 4.65 X10*6/UL (ref 4–5.2)
RSV RNA RESP QL NAA+PROBE: NOT DETECTED
S PNEUM AG UR QL: NEGATIVE
SODIUM SERPL-SCNC: 131 MMOL/L (ref 136–145)
T AXIS: 74 DEGREES
T OFFSET: 404 MS
VENTRICULAR RATE: 90 BPM
WBC # BLD AUTO: 6.9 X10*3/UL (ref 4.4–11.3)

## 2024-12-09 PROCEDURE — 2500000001 HC RX 250 WO HCPCS SELF ADMINISTERED DRUGS (ALT 637 FOR MEDICARE OP): Performed by: REGISTERED NURSE

## 2024-12-09 PROCEDURE — 82947 ASSAY GLUCOSE BLOOD QUANT: CPT

## 2024-12-09 PROCEDURE — 93005 ELECTROCARDIOGRAM TRACING: CPT

## 2024-12-09 PROCEDURE — 94640 AIRWAY INHALATION TREATMENT: CPT

## 2024-12-09 PROCEDURE — 36415 COLL VENOUS BLD VENIPUNCTURE: CPT | Performed by: INTERNAL MEDICINE

## 2024-12-09 PROCEDURE — 2500000004 HC RX 250 GENERAL PHARMACY W/ HCPCS (ALT 636 FOR OP/ED): Performed by: INTERNAL MEDICINE

## 2024-12-09 PROCEDURE — S4991 NICOTINE PATCH NONLEGEND: HCPCS | Performed by: INTERNAL MEDICINE

## 2024-12-09 PROCEDURE — 9420000001 HC RT PATIENT EDUCATION 5 MIN

## 2024-12-09 PROCEDURE — 2500000001 HC RX 250 WO HCPCS SELF ADMINISTERED DRUGS (ALT 637 FOR MEDICARE OP): Performed by: HOSPITALIST

## 2024-12-09 PROCEDURE — 87899 AGENT NOS ASSAY W/OPTIC: CPT | Mod: WESLAB | Performed by: INTERNAL MEDICINE

## 2024-12-09 PROCEDURE — 2500000002 HC RX 250 W HCPCS SELF ADMINISTERED DRUGS (ALT 637 FOR MEDICARE OP, ALT 636 FOR OP/ED): Performed by: HOSPITALIST

## 2024-12-09 PROCEDURE — 85027 COMPLETE CBC AUTOMATED: CPT | Performed by: INTERNAL MEDICINE

## 2024-12-09 PROCEDURE — 99232 SBSQ HOSP IP/OBS MODERATE 35: CPT | Performed by: HOSPITALIST

## 2024-12-09 PROCEDURE — 87449 NOS EACH ORGANISM AG IA: CPT | Mod: WESLAB | Performed by: INTERNAL MEDICINE

## 2024-12-09 PROCEDURE — 94760 N-INVAS EAR/PLS OXIMETRY 1: CPT

## 2024-12-09 PROCEDURE — 2500000004 HC RX 250 GENERAL PHARMACY W/ HCPCS (ALT 636 FOR OP/ED): Mod: JZ

## 2024-12-09 PROCEDURE — 2500000001 HC RX 250 WO HCPCS SELF ADMINISTERED DRUGS (ALT 637 FOR MEDICARE OP): Performed by: INTERNAL MEDICINE

## 2024-12-09 PROCEDURE — 80048 BASIC METABOLIC PNL TOTAL CA: CPT | Performed by: INTERNAL MEDICINE

## 2024-12-09 PROCEDURE — 99252 IP/OBS CONSLTJ NEW/EST SF 35: CPT | Performed by: STUDENT IN AN ORGANIZED HEALTH CARE EDUCATION/TRAINING PROGRAM

## 2024-12-09 PROCEDURE — 2500000005 HC RX 250 GENERAL PHARMACY W/O HCPCS: Performed by: INTERNAL MEDICINE

## 2024-12-09 PROCEDURE — 1200000002 HC GENERAL ROOM WITH TELEMETRY DAILY

## 2024-12-09 PROCEDURE — 2500000002 HC RX 250 W HCPCS SELF ADMINISTERED DRUGS (ALT 637 FOR MEDICARE OP, ALT 636 FOR OP/ED): Performed by: INTERNAL MEDICINE

## 2024-12-09 PROCEDURE — 87632 RESP VIRUS 6-11 TARGETS: CPT | Performed by: STUDENT IN AN ORGANIZED HEALTH CARE EDUCATION/TRAINING PROGRAM

## 2024-12-09 RX ORDER — BENZONATATE 100 MG/1
100 CAPSULE ORAL 3 TIMES DAILY PRN
Status: DISCONTINUED | OUTPATIENT
Start: 2024-12-09 | End: 2024-12-09

## 2024-12-09 RX ORDER — INSULIN GLARGINE 100 [IU]/ML
22 INJECTION, SOLUTION SUBCUTANEOUS NIGHTLY
Status: DISCONTINUED | OUTPATIENT
Start: 2024-12-09 | End: 2024-12-11

## 2024-12-09 RX ORDER — ACETYLCYSTEINE 200 MG/ML
3 SOLUTION ORAL; RESPIRATORY (INHALATION)
Status: DISCONTINUED | OUTPATIENT
Start: 2024-12-09 | End: 2024-12-09

## 2024-12-09 RX ORDER — HYDROCODONE BITARTRATE AND HOMATROPINE METHYLBROMIDE ORAL SOLUTION 5; 1.5 MG/5ML; MG/5ML
5 LIQUID ORAL EVERY 6 HOURS PRN
Status: DISCONTINUED | OUTPATIENT
Start: 2024-12-09 | End: 2024-12-17 | Stop reason: HOSPADM

## 2024-12-09 RX ORDER — ACETYLCYSTEINE 200 MG/ML
3 SOLUTION ORAL; RESPIRATORY (INHALATION)
Status: DISCONTINUED | OUTPATIENT
Start: 2024-12-09 | End: 2024-12-11

## 2024-12-09 RX ORDER — TEMAZEPAM 15 MG/1
15 CAPSULE ORAL NIGHTLY PRN
Status: DISCONTINUED | OUTPATIENT
Start: 2024-12-09 | End: 2024-12-17 | Stop reason: HOSPADM

## 2024-12-09 RX ORDER — BENZONATATE 100 MG/1
100 CAPSULE ORAL 3 TIMES DAILY
Status: DISCONTINUED | OUTPATIENT
Start: 2024-12-09 | End: 2024-12-17 | Stop reason: HOSPADM

## 2024-12-09 RX ORDER — PREDNISONE 20 MG/1
40 TABLET ORAL DAILY
Status: DISCONTINUED | OUTPATIENT
Start: 2024-12-10 | End: 2024-12-14

## 2024-12-09 RX ADMIN — CLOPIDOGREL BISULFATE 75 MG: 75 TABLET ORAL at 09:30

## 2024-12-09 RX ADMIN — PANTOPRAZOLE SODIUM 20 MG: 20 TABLET, DELAYED RELEASE ORAL at 06:08

## 2024-12-09 RX ADMIN — IPRATROPIUM BROMIDE AND ALBUTEROL SULFATE 3 ML: 2.5; .5 SOLUTION RESPIRATORY (INHALATION) at 04:19

## 2024-12-09 RX ADMIN — BENZOCAINE AND MENTHOL 1 LOZENGE: 15; 3.6 LOZENGE ORAL at 02:49

## 2024-12-09 RX ADMIN — IPRATROPIUM BROMIDE AND ALBUTEROL SULFATE 3 ML: 2.5; .5 SOLUTION RESPIRATORY (INHALATION) at 19:56

## 2024-12-09 RX ADMIN — ACETYLCYSTEINE 600 MG: 200 SOLUTION ORAL; RESPIRATORY (INHALATION) at 19:54

## 2024-12-09 RX ADMIN — HYDROCODONE BITARTRATE AND HOMATROPINE METHYLBROMIDE 5 ML: 5; 1.5 SOLUTION ORAL at 22:07

## 2024-12-09 RX ADMIN — BENZOCAINE AND MENTHOL 1 LOZENGE: 15; 3.6 LOZENGE ORAL at 13:17

## 2024-12-09 RX ADMIN — INSULIN LISPRO 8 UNITS: 100 INJECTION, SOLUTION INTRAVENOUS; SUBCUTANEOUS at 16:56

## 2024-12-09 RX ADMIN — INSULIN GLARGINE 22 UNITS: 100 INJECTION, SOLUTION SUBCUTANEOUS at 20:45

## 2024-12-09 RX ADMIN — BENZONATATE 100 MG: 100 CAPSULE ORAL at 12:04

## 2024-12-09 RX ADMIN — DEXTROSE MONOHYDRATE 500 MG: 50 INJECTION, SOLUTION INTRAVENOUS at 09:31

## 2024-12-09 RX ADMIN — HYDROCODONE BITARTRATE AND HOMATROPINE METHYLBROMIDE 5 ML: 5; 1.5 SOLUTION ORAL at 09:31

## 2024-12-09 RX ADMIN — PIPERACILLIN SODIUM AND TAZOBACTAM SODIUM 3.38 G: 3; .375 INJECTION, SOLUTION INTRAVENOUS at 13:21

## 2024-12-09 RX ADMIN — NICOTINE 1 PATCH: 21 PATCH, EXTENDED RELEASE TRANSDERMAL at 09:30

## 2024-12-09 RX ADMIN — BENZONATATE 100 MG: 100 CAPSULE ORAL at 20:47

## 2024-12-09 RX ADMIN — INSULIN LISPRO 4 UNITS: 100 INJECTION, SOLUTION INTRAVENOUS; SUBCUTANEOUS at 10:07

## 2024-12-09 RX ADMIN — IPRATROPIUM BROMIDE AND ALBUTEROL SULFATE 3 ML: 2.5; .5 SOLUTION RESPIRATORY (INHALATION) at 15:34

## 2024-12-09 RX ADMIN — BENZOCAINE AND MENTHOL 1 LOZENGE: 15; 3.6 LOZENGE ORAL at 06:08

## 2024-12-09 RX ADMIN — HYDROCODONE BITARTRATE AND HOMATROPINE METHYLBROMIDE 5 ML: 5; 1.5 SOLUTION ORAL at 15:38

## 2024-12-09 RX ADMIN — NICOTINE 1 PATCH: 21 PATCH, EXTENDED RELEASE TRANSDERMAL at 21:00

## 2024-12-09 RX ADMIN — DAPAGLIFLOZIN 10 MG: 10 TABLET, FILM COATED ORAL at 09:30

## 2024-12-09 RX ADMIN — METHYLPREDNISOLONE SODIUM SUCCINATE 40 MG: 40 INJECTION, POWDER, FOR SOLUTION INTRAMUSCULAR; INTRAVENOUS at 09:30

## 2024-12-09 RX ADMIN — Medication 3 L/MIN: at 04:19

## 2024-12-09 RX ADMIN — IPRATROPIUM BROMIDE AND ALBUTEROL SULFATE 3 ML: 2.5; .5 SOLUTION RESPIRATORY (INHALATION) at 07:35

## 2024-12-09 RX ADMIN — INSULIN LISPRO 4 UNITS: 100 INJECTION, SOLUTION INTRAVENOUS; SUBCUTANEOUS at 13:23

## 2024-12-09 RX ADMIN — Medication 4 L/MIN: at 19:56

## 2024-12-09 RX ADMIN — VANCOMYCIN 1250 MG: 1.75 INJECTION, SOLUTION INTRAVENOUS at 00:12

## 2024-12-09 RX ADMIN — IPRATROPIUM BROMIDE AND ALBUTEROL SULFATE 3 ML: 2.5; .5 SOLUTION RESPIRATORY (INHALATION) at 11:15

## 2024-12-09 RX ADMIN — BENZOCAINE AND MENTHOL 1 LOZENGE: 15; 3.6 LOZENGE ORAL at 22:07

## 2024-12-09 RX ADMIN — SODIUM CHLORIDE 125 ML/HR: 900 INJECTION, SOLUTION INTRAVENOUS at 21:00

## 2024-12-09 RX ADMIN — PIPERACILLIN SODIUM AND TAZOBACTAM SODIUM 3.38 G: 3; .375 INJECTION, SOLUTION INTRAVENOUS at 06:08

## 2024-12-09 RX ADMIN — Medication 4 L/MIN: at 01:12

## 2024-12-09 RX ADMIN — TEMAZEPAM 15 MG: 15 CAPSULE ORAL at 20:48

## 2024-12-09 RX ADMIN — PIPERACILLIN SODIUM AND TAZOBACTAM SODIUM 3.38 G: 3; .375 INJECTION, SOLUTION INTRAVENOUS at 18:47

## 2024-12-09 RX ADMIN — Medication 36 PERCENT: at 07:42

## 2024-12-09 RX ADMIN — ENOXAPARIN SODIUM 40 MG: 40 INJECTION SUBCUTANEOUS at 20:47

## 2024-12-09 RX ADMIN — HYDROCODONE BITARTRATE AND HOMATROPINE METHYLBROMIDE 5 ML: 5; 1.5 SOLUTION ORAL at 02:47

## 2024-12-09 RX ADMIN — BENZONATATE 100 MG: 100 CAPSULE ORAL at 15:38

## 2024-12-09 RX ADMIN — ACETYLCYSTEINE 600 MG: 200 SOLUTION ORAL; RESPIRATORY (INHALATION) at 15:34

## 2024-12-09 RX ADMIN — IPRATROPIUM BROMIDE AND ALBUTEROL SULFATE 3 ML: 2.5; .5 SOLUTION RESPIRATORY (INHALATION) at 01:12

## 2024-12-09 RX ADMIN — BENZOCAINE AND MENTHOL 1 LOZENGE: 15; 3.6 LOZENGE ORAL at 18:56

## 2024-12-09 RX ADMIN — SODIUM CHLORIDE 125 ML/HR: 900 INJECTION, SOLUTION INTRAVENOUS at 12:06

## 2024-12-09 RX ADMIN — ACETYLCYSTEINE 600 MG: 200 SOLUTION ORAL; RESPIRATORY (INHALATION) at 11:15

## 2024-12-09 RX ADMIN — ACETYLCYSTEINE 600 MG: 200 SOLUTION ORAL; RESPIRATORY (INHALATION) at 07:35

## 2024-12-09 ASSESSMENT — ACTIVITIES OF DAILY LIVING (ADL)
ASSISTIVE_DEVICE: WALKER
GROOMING: INDEPENDENT
HEARING - LEFT EAR: FUNCTIONAL
ADEQUATE_TO_COMPLETE_ADL: YES
DRESSING YOURSELF: INDEPENDENT
WALKS IN HOME: INDEPENDENT
BATHING: INDEPENDENT
FEEDING YOURSELF: INDEPENDENT
PATIENT'S MEMORY ADEQUATE TO SAFELY COMPLETE DAILY ACTIVITIES?: YES
HEARING - RIGHT EAR: FUNCTIONAL
JUDGMENT_ADEQUATE_SAFELY_COMPLETE_DAILY_ACTIVITIES: YES
TOILETING: INDEPENDENT

## 2024-12-09 ASSESSMENT — COGNITIVE AND FUNCTIONAL STATUS - GENERAL
DAILY ACTIVITIY SCORE: 24
DAILY ACTIVITIY SCORE: 24
MOBILITY SCORE: 24
MOBILITY SCORE: 24

## 2024-12-09 ASSESSMENT — PAIN SCALES - GENERAL
PAINLEVEL_OUTOF10: 5 - MODERATE PAIN
PAINLEVEL_OUTOF10: 2
PAINLEVEL_OUTOF10: 2

## 2024-12-09 ASSESSMENT — PAIN - FUNCTIONAL ASSESSMENT
PAIN_FUNCTIONAL_ASSESSMENT: 0-10

## 2024-12-09 ASSESSMENT — PAIN DESCRIPTION - DESCRIPTORS
DESCRIPTORS: DISCOMFORT
DESCRIPTORS: SORE;ACHING;DISCOMFORT
DESCRIPTORS: ACHING
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
## 2024-12-09 NOTE — NURSING NOTE
Patient requesting another breathing treatment. Cough persistent-patient not coughing up any sputum at this time.

## 2024-12-09 NOTE — NURSING NOTE
Secure chat sent to hospitalist to inquire about medication for cough. Cough persistent and patient having difficulty catching her breath d/t cough.

## 2024-12-09 NOTE — PROGRESS NOTES
Alanna Hickman is a 44 y.o. female on day 1 of admission presenting with Acute hypoxemic respiratory failure (Multi).      Subjective   Patient reports she feels terrible. Says she has been coughing so much that she cannot sleep. Reports pain in her chest with coughing but okay at rest.     Also reports R arm is hurting a lot but that this started suddenly, while fluids being infused. RN there to look at IV at time of my rounding.       Objective     Last Recorded Vitals  /63 (BP Location: Left arm, Patient Position: Lying)   Pulse 99   Temp 36.8 °C (98.2 °F) (Oral)   Resp 20   Wt 107 kg (235 lb)   SpO2 93%   Intake/Output last 3 Shifts:    Intake/Output Summary (Last 24 hours) at 12/9/2024 1103  Last data filed at 12/9/2024 1057  Gross per 24 hour   Intake 2587.75 ml   Output 1700 ml   Net 887.75 ml       Admission Weight  Weight: 107 kg (235 lb) (12/08/24 1805)    Daily Weight  12/08/24 : 107 kg (235 lb)    Image Results  ECG 12 lead   Poor data quality, interpretation may be adversely affected  Normal sinus rhythm  Left anterior fascicular block  Abnormal ECG  No previous ECGs available  Confirmed by Anand Lorenzo ([id number]) on 12/9/2024 10:16:19 AM      Physical Exam  General: alert, no diaphoresis   Lungs: diminished throughout, no wheeze   Heart: RRR, no LE edema BL   GI: abdomen soft, nontender, nondistended, BS present   MSK: no joint effusion or deformity   Skin: no rashes, erythema, or ecchymosis   Neuro: grossly normal cognition, motor strength, sensation    Relevant Results               Assessment/Plan                  Assessment & Plan  Bacterial pneumonia  Multifocal interestingly, and will consider this failed treatment on cefuroxime.  Admittedly, this may be overkill, but the patient clinically appears quite ill, and feel it is safer, at least for tonight, to go broader coverage with vancomycin (MRSA nares ordered), Zosyn, and azithromycin for atypical coverage. (MRSA swab was negative  and vanco was stopped)  Streptococcus, Legionella, mycoplasma assays  Pulmonology consult.  Acute hypoxemic respiratory failure (Multi)  Secondary to multifocal pneumonia  On 4 L.  Wean oxygen as able.  Will order continuous tele  Pulmonology consult  Tobacco abuse  Discussed cessation briefly, and will order a nicotine patch.  DM (diabetes mellitus) (Multi)  A1c in 23 was 9.2.  Continue Tresiba, slightly lower dose while in the hospital and uptitrate as needed  Sliding scale.  Add on A1c.  Dehydration  Hemoglobin is 3 points higher than typical with poor by mouth intake with dry mucous membranes  Continues to appear dehydrated  continue normal saline 125 mL/h.  DVT ppx            Alice YESI Kevin DO

## 2024-12-09 NOTE — NURSING NOTE
Secure chat sent to hospitalist to see if anything can be added for patient as she is requesting breathing treatments q2. Slight improvement in wheezing post steroid dose from 2230.

## 2024-12-09 NOTE — ASSESSMENT & PLAN NOTE
Multifocal interestingly, and will consider this failed treatment on cefuroxime.  Admittedly, this may be overkill, but the patient clinically appears quite ill, and feel it is safer, at least for tonight, to go broader coverage with vancomycin (MRSA nares ordered), Zosyn, and azithromycin for atypical coverage  Streptococcus, Legionella, mycoplasma assays  Pulmonology consult.

## 2024-12-09 NOTE — NURSING NOTE
Assumed care of patient. Patient is currently receiving a breathing treatment. She did complain of a frequent cough and asked about the cough med. She was made aware that she was not due yet. Call light is in place will continue to monitor.

## 2024-12-09 NOTE — CARE PLAN
The patient's goals for the shift include sleep and control cough    The clinical goals for the shift include manage cough      Problem: Pain - Adult  Goal: Verbalizes/displays adequate comfort level or baseline comfort level  Outcome: Progressing     Problem: Safety - Adult  Goal: Free from fall injury  Outcome: Progressing     Problem: Discharge Planning  Goal: Discharge to home or other facility with appropriate resources  Outcome: Progressing     Problem: Chronic Conditions and Co-morbidities  Goal: Patient's chronic conditions and co-morbidity symptoms are monitored and maintained or improved  Outcome: Progressing

## 2024-12-09 NOTE — NURSING NOTE
Humidification added to oxygen. Patient reported blood tinged sputum but no episodes since initial reporting.

## 2024-12-09 NOTE — ASSESSMENT & PLAN NOTE
Hemoglobin is 3 points higher than typical with poor by mouth intake with dry mucous membranes  Continues to appear dehydrated  continue normal saline 125 mL/h.

## 2024-12-09 NOTE — ASSESSMENT & PLAN NOTE
Secondary to multifocal pneumonia  On 4 L.  Wean oxygen as able.  Will order continuous tele  Pulmonology consult

## 2024-12-09 NOTE — ASSESSMENT & PLAN NOTE
A1c in 23 was 9.2.  Continue Tresiba, slightly lower dose while in the hospital and uptitrate as needed  Sliding scale.  Add on A1c.

## 2024-12-09 NOTE — CARE PLAN
The patient's goals for the shift include sleep and control cough    The clinical goals for the shift include manage cough      Problem: Pain - Adult  Goal: Verbalizes/displays adequate comfort level or baseline comfort level  Outcome: Progressing     Problem: Safety - Adult  Goal: Free from fall injury  Outcome: Progressing     Problem: Discharge Planning  Goal: Discharge to home or other facility with appropriate resources  Outcome: Progressing     Problem: Chronic Conditions and Co-morbidities  Goal: Patient's chronic conditions and co-morbidity symptoms are monitored and maintained or improved  Outcome: Progressing     Problem: Respiratory  Goal: Clear secretions with interventions this shift  Outcome: Progressing  Goal: Minimize anxiety/maximize coping throughout shift  Outcome: Progressing  Goal: Minimal/no exertional discomfort or dyspnea this shift  Outcome: Progressing  Goal: No signs of respiratory distress (eg. Use of accessory muscles. Peds grunting)  Outcome: Progressing  Goal: Patent airway maintained this shift  Outcome: Progressing  Goal: Tolerate pulmonary toileting this shift  Outcome: Progressing  Goal: Verbalize decreased shortness of breath this shift  Outcome: Progressing  Goal: Wean oxygen to maintain O2 saturation per order/standard this shift  Outcome: Progressing  Goal: Increase self care and/or family involvement in next 24 hours  Outcome: Progressing     Problem: Fall/Injury  Goal: Not fall by end of shift  Outcome: Progressing  Goal: Be free from injury by end of the shift  Outcome: Progressing  Goal: Verbalize understanding of personal risk factors for fall in the hospital  Outcome: Progressing  Goal: Verbalize understanding of risk factor reduction measures to prevent injury from fall in the home  Outcome: Progressing  Goal: Use assistive devices by end of the shift  Outcome: Progressing  Goal: Pace activities to prevent fatigue by end of the shift  Outcome: Progressing

## 2024-12-09 NOTE — CONSULTS
Vancomycin Dosing by Pharmacy- INITIAL    Alanna Hickman is a 44 y.o. year old female who Pharmacy has been consulted for vancomycin dosing for pneumonia. Based on the patient's indication and renal status this patient will be dosed based on a goal AUC of 400-600.     Renal function is currently stable.    Visit Vitals  /89 (BP Location: Right arm, Patient Position: Sitting)   Pulse 93   Temp 36.8 °C (98.2 °F) (Oral)   Resp (!) 22        Lab Results   Component Value Date    CREATININE 0.59 2024    CREATININE 0.60 2024    CREATININE 0.7 2023    CREATININE 0.5 2023    CREATININE 0.5 2023    CREATININE 0.4 2022        Patient weight is as follows:   Vitals:    24 1805   Weight: 107 kg (235 lb)       Cultures:  No results found for the encounter in last 14 days.        No intake/output data recorded.  I/O during current shift:  No intake/output data recorded.    Temp (24hrs), Av.7 °C (98.1 °F), Min:36.6 °C (97.9 °F), Max:36.8 °C (98.2 °F)         Assessment/Plan     Patient will not be given a loading dose.  Will initiate vancomycin maintenance,  1250 mg every 12 hours, first dose 24 @ 2200.    This dosing regimen is predicted by 2C2PRx to result in the following pharmacokinetic parameters:  Loading dose: N/A  Regimen: 1250 mg IV every 12 hours.  Start time: 21:12 on 2024  Exposure target: AUC24 (range)400-600 mg/L.hr   [id number]: 378 mg/L.hr  AUC24,ss: 454 mg/L.hr  Probability of AUC24 > 400: 64 %  Ctrough,ss: 14.1 mg/L  Probability of Ctrough,ss > 20: 22 %    Follow-up level will be ordered on 24 with AM labs, unless clinically indicated sooner.  MRSA PCR ordered for potential de-escalation/discontinuation of vancomycin therapy.  Will continue to monitor renal function daily while on vancomycin and order serum creatinine at least every 48 hours if not already ordered.  Follow for continued vancomycin needs, clinical response, and signs/symptoms  of toxicity.       Soham Reich, PharmD

## 2024-12-09 NOTE — ASSESSMENT & PLAN NOTE
Hemoglobin is 3 points higher than typical with poor by mouth intake with dry mucous membranes  Will bolus another liter of normal saline and run a more aggressive normal saline 125 mL/h.

## 2024-12-09 NOTE — CONSULTS
Pulmonary Consultation Note   Subjective    Alanna Hickman is a 44 y.o. year old female patient known with asthma, CVA on plavix, DM, smoker, possible spondyloarthropathy, admitted on 12/8/2024 with following acute hypoxic respiratory failure due to pneumonia. Pulmonary are consulted for the for respiratory failure and pneumonia.    History of Present Illness:  Patient mentions that since Thanksgiving she started to have a cough which was productive in nature, shortness of breath, feeling unwell.  She mentions having blood streaks that spurted after coughing episode on Thursday however that has resolved.  She mentions pleuritic type of pain and chest pain that is constant with coughing.  Her primary care physician performed an x-ray on Friday which showed a pneumonia and he prescribed prednisone and cefuroxime which she has started but symptoms did not improve so she presented on Sunday.  Patient does not have a working thermostat at home so she is not sure if she had a fever.  Reports hoarseness    Before this episode, mentions shortness of breath on exertion.  She is only on albuterol as needed at home for her asthma.  She has received prednisone as needed for her back pain.     She was worked up for ankylosing spondylitis however rheumatology does not think she has at according to patient.  Last note from rheumatology in care everywhere that I can see mentions on the spondyloarthropathy and that rheumatologist ordered further x-rays unsure of final assessment.  She has a negative SHARA and rheumatoid factor done in 2022.      Past Medical History  She has a past medical history of Bilateral hand pain and Type 2 diabetes mellitus.    Surgical History  She has a past surgical history that includes MR angio head wo IV contrast (6/16/2022).     Social History  She reports that she has been smoking cigarettes. She started smoking about 24 years ago. She has a 25.5 pack-year smoking history. She has been exposed to  tobacco smoke. She has never used smokeless tobacco. She reports that she does not currently use alcohol. She reports that she does not use drugs.  Cigarette smoking history:   Marijuana use:  Vaping:  Pets:   Asbestos:  Other specific exposure:  Currently on disability      Family History  Family History   Problem Relation Name Age of Onset    Arthritis Mother      Hypertension Mother      Other (other conditons influening health) Mother      Heart failure Father      Heart attack Father      Diabetes type II Father      Other (severe back pain) Sister      Other (?OD) Sister          uncertain    Coronary artery disease Brother      Heart attack Brother      Other (back pain) Brother      Diabetes type II Brother      Other (bladder cancer) Brother      Other (Hunchback arthritis) Paternal Grandmother      Breast cancer Mother's Sister          Allergies  Dulaglutide    Review of Systems  As above      Meds    Scheduled medications  acetylcysteine, 3 mL, nebulization, 4x daily  azithromycin, 500 mg, intravenous, q24h  clopidogrel, 75 mg, oral, Daily  dapagliflozin propanediol, 10 mg, oral, Daily  enoxaparin, 40 mg, subcutaneous, q24h  insulin glargine, 16 Units, subcutaneous, Nightly  insulin lispro, 0-10 Units, subcutaneous, TID AC  ipratropium-albuteroL, 3 mL, nebulization, 4x daily  methylPREDNISolone sodium succinate (PF), 40 mg, intravenous, q12h  nicotine, 1 patch, transdermal, q24h MARY  oxygen, , inhalation, Continuous - Inhalation  pantoprazole, 20 mg, oral, Daily before breakfast  piperacillin-tazobactam, 3.375 g, intravenous, q6h      Continuous medications  sodium chloride 0.9%, 125 mL/hr, Last Rate: 125 mL/hr (12/09/24 0446)      PRN medications  PRN medications: acetaminophen **OR** acetaminophen **OR** acetaminophen, benzocaine-menthol, hydrocodone-homatropine, ipratropium-albuteroL, melatonin, ondansetron ODT **OR** ondansetron     Objective      Last Recorded Vitals  /63 (BP Location: Left  "arm, Patient Position: Lying)   Pulse 99   Temp 36.8 °C (98.2 °F) (Oral)   Resp 20   Wt 107 kg (235 lb)   SpO2 93%     Blood pressure 110/63, pulse 99, temperature 36.8 °C (98.2 °F), temperature source Oral, resp. rate 20, height 1.702 m (5' 7\"), weight 107 kg (235 lb), SpO2 93%.   Physical Exam   GENERAL: Anxious, not in respiratory distress  HEAD/SINUSES: On nasal cannula 4 L  NECK: Large neck  LUNGS: Decreased air entry, some wheezing and rhonchi anteriorly  CARDIAC: Regular rate and rhythm  EXTREMITIES: No edema,   NEURO: grossly normal mental status,   SKIN: Skin turgor normal.   PSYCH: Anxious    Intake/Output Summary (Last 24 hours) at 12/9/2024 1041  Last data filed at 12/9/2024 0907  Gross per 24 hour   Intake 2437.75 ml   Output 1500 ml   Net 937.75 ml     Labs:   Results from last 72 hours   Lab Units 12/09/24  0456 12/08/24  1813   SODIUM mmol/L 131* 133*   POTASSIUM mmol/L 4.1 4.2   CHLORIDE mmol/L 98 96*   CO2 mmol/L 24 29   BUN mg/dL 9 10   CREATININE mg/dL 0.45* 0.59   GLUCOSE mg/dL 185* 126*   CALCIUM mg/dL 8.3* 9.4   ANION GAP mmol/L 13 12   EGFR mL/min/1.73m*2 >90 >90      Results from last 72 hours   Lab Units 12/09/24  0456 12/08/24  1813   WBC AUTO x10*3/uL 6.9 7.9   HEMOGLOBIN g/dL 14.8 17.4*   HEMATOCRIT % 43.6 51.8*   PLATELETS AUTO x10*3/uL 180 219   NEUTROS PCT AUTO %  --  77.5   LYMPHS PCT AUTO %  --  10.5   MONOS PCT AUTO %  --  10.2   EOS PCT AUTO %  --  0.8               Micro/ID:   Lab Results   Component Value Date    URINECULTURE No significant growth 11/20/2023    BLOODCULT Loaded on Instrument - Culture in progress 12/08/2024    BLOODCULT Loaded on Instrument - Culture in progress 12/08/2024     Summary of key imaging results from the last 24 hours  CT chest shows tree-in-bud appearance bilaterally and bronchial Dilation      TTE 2022  There is normal left ventricular systolic function.  Estimated ejection fraction is 60-64%.  No cardiac source of embolus " identified.  Impression   Alanna Hickman is a 44 y.o. year old female patient known with asthma, CVA on plavix, DM, smoker, possible spondyloarthropathy, admitted on 12/8/2024 with following acute hypoxic respiratory failure due to pneumonia. Pulmonary are consulted for the for respiratory failure and pneumonia.  Acute hypoxic respiratory failure due to bronchopneumonia most probably infectious in nature, with history of dysphagia this could be also aspiration related however with viral prodrome before presentation favors infectious process viral versus atypical pneumonia versus bacterial.  She is not on chronic steroids however does use steroids intermittently for back pain.  History of asthma which does not seem very well-controlled, no PFTs on chart, on albuterol as needed at home  History of CVA and dysphagia  Smoker    Recommendations   As follows:  Recommend continuing antibiotics however feel like ceftriaxone 2 g and Zithromax should be enough instead of empiric broad coverage, would recommend treating for 7 days minimum  If MRSA negative would recommend stopping vancomycin  Strep and Legionella antigens, MRSA nare, sputum culture, procalcitonin, RVP  SLP and rule out silent aspiration   Wean oxygen as tolerated and once on room air, recommend ambulatory pulse ox  Bronchopulmonary hygiene with Incentive spirometry  Monitor for hemoptysis  Prednisone for 5 days  Scheduled neb therapy, would consider LABA/ICS on discharge  Will need PFTs outpatient and follow-up CAT scan in 4 to 6 weeks to ensure resolution  Will need outpatient pulmonary follow-up    Patricia Scruggs MD   12/09/24 at 10:41 AM     -Only the Medical problems listed under impression were addressed today.   -Please contact primary team for all other concerns and medical problem  -Thank you for your consult     Disclaimer: Documentation completed with the information available at the time of input. Parts of this note may have been scribed or  generated using voice dictation software, Dragon.  Homophonic errors may exist.  Please contact me directly if clarification is needed. The times in the chart may not be reflective of actual patient care times, interventions, or procedures. Documentation occurs after the physical care of the patient.

## 2024-12-09 NOTE — CARE PLAN
Problem: Respiratory  Goal: Clear secretions with interventions this shift  Outcome: Progressing  Goal: Minimize anxiety/maximize coping throughout shift  Outcome: Progressing  Goal: No signs of respiratory distress (eg. Use of accessory muscles. Peds grunting)  Outcome: Progressing  Goal: Wean oxygen to maintain O2 saturation per order/standard this shift  Outcome: Progressing

## 2024-12-09 NOTE — CARE PLAN
Patient c/o burning throat and blood tinged oral secretions and blood tinged nasal secretions; nursing notified.

## 2024-12-09 NOTE — PROGRESS NOTES
Speech-Language Pathology                 Therapy Communication Note    Patient Name: Alanna Hickman  MRN: [id number]  Department: [address]  Room: [address] [place] [address]  Today's Date: 12/9/2024     Discipline: Speech Language Pathology    Missed Visit Reason:  Attempted to complete CSE, pt was having breathing tx, will complete CSE tomorrow, pt is tolerating regular and thin liquids , taking pills without difficulty or s/s aspiration per RN    Missed Time: Attempt    Comment:

## 2024-12-09 NOTE — ASSESSMENT & PLAN NOTE
Multifocal interestingly, and will consider this failed treatment on cefuroxime.  Admittedly, this may be overkill, but the patient clinically appears quite ill, and feel it is safer, at least for tonight, to go broader coverage with vancomycin (MRSA nares ordered), Zosyn, and azithromycin for atypical coverage. (MRSA swab was negative and vanco was stopped)  Streptococcus, Legionella, mycoplasma assays  Pulmonology consult.

## 2024-12-10 LAB
ANION GAP BLDA CALCULATED.4IONS-SCNC: 5 MMO/L (ref 10–25)
ANION GAP SERPL CALCULATED.3IONS-SCNC: 11 MMOL/L (ref 10–20)
APPARATUS: ABNORMAL
ARTERIAL PATENCY WRIST A: POSITIVE
BASE EXCESS BLDA CALC-SCNC: 4.4 MMOL/L (ref -2–3)
BNP SERPL-MCNC: 53 PG/ML (ref 0–99)
BODY TEMPERATURE: 37 DEGREES CELSIUS
BUN SERPL-MCNC: 10 MG/DL (ref 6–23)
CA-I BLDA-SCNC: 1.22 MMOL/L (ref 1.1–1.33)
CALCIUM SERPL-MCNC: 8.4 MG/DL (ref 8.6–10.3)
CHLORIDE BLDA-SCNC: 96 MMOL/L (ref 98–107)
CHLORIDE SERPL-SCNC: 99 MMOL/L (ref 98–107)
CO2 SERPL-SCNC: 25 MMOL/L (ref 21–32)
CREAT SERPL-MCNC: 0.44 MG/DL (ref 0.5–1.05)
EGFRCR SERPLBLD CKD-EPI 2021: >90 ML/MIN/1.73M*2
ERYTHROCYTE [DISTWIDTH] IN BLOOD BY AUTOMATED COUNT: 14.8 % (ref 11.5–14.5)
FLOW: 4 LPM
GLUCOSE BLD MANUAL STRIP-MCNC: 172 MG/DL (ref 74–99)
GLUCOSE BLD MANUAL STRIP-MCNC: 271 MG/DL (ref 74–99)
GLUCOSE BLD MANUAL STRIP-MCNC: 279 MG/DL (ref 74–99)
GLUCOSE BLD MANUAL STRIP-MCNC: 364 MG/DL (ref 74–99)
GLUCOSE BLDA-MCNC: 309 MG/DL (ref 74–99)
GLUCOSE SERPL-MCNC: 166 MG/DL (ref 74–99)
HCO3 BLDA-SCNC: 29.8 MMOL/L (ref 22–26)
HCT VFR BLD AUTO: 44.2 % (ref 36–46)
HCT VFR BLD EST: 45 % (ref 36–46)
HGB BLD-MCNC: 14.7 G/DL (ref 12–16)
HGB BLDA-MCNC: 15 G/DL (ref 12–16)
INHALED O2 CONCENTRATION: 36 %
LACTATE BLDA-SCNC: 1 MMOL/L (ref 0.4–2)
MCH RBC QN AUTO: 32.4 PG (ref 26–34)
MCHC RBC AUTO-ENTMCNC: 33.3 G/DL (ref 32–36)
MCV RBC AUTO: 97 FL (ref 80–100)
NRBC BLD-RTO: 0 /100 WBCS (ref 0–0)
OXYHGB MFR BLDA: 90.3 % (ref 94–98)
PCO2 BLDA: 46 MM HG (ref 38–42)
PH BLDA: 7.42 PH (ref 7.38–7.42)
PLATELET # BLD AUTO: 229 X10*3/UL (ref 150–450)
PO2 BLDA: 64 MM HG (ref 85–95)
POTASSIUM BLDA-SCNC: 4.8 MMOL/L (ref 3.5–5.3)
POTASSIUM SERPL-SCNC: 4.3 MMOL/L (ref 3.5–5.3)
PROCALCITONIN SERPL-MCNC: 0.06 NG/ML
RBC # BLD AUTO: 4.54 X10*6/UL (ref 4–5.2)
SAO2 % BLDA: 93 % (ref 94–100)
SODIUM BLDA-SCNC: 126 MMOL/L (ref 136–145)
SODIUM SERPL-SCNC: 131 MMOL/L (ref 136–145)
SPECIMEN DRAWN FROM PATIENT: ABNORMAL
WBC # BLD AUTO: 8.8 X10*3/UL (ref 4.4–11.3)

## 2024-12-10 PROCEDURE — 99232 SBSQ HOSP IP/OBS MODERATE 35: CPT | Performed by: STUDENT IN AN ORGANIZED HEALTH CARE EDUCATION/TRAINING PROGRAM

## 2024-12-10 PROCEDURE — 87070 CULTURE OTHR SPECIMN AEROBIC: CPT | Mod: WESLAB | Performed by: INTERNAL MEDICINE

## 2024-12-10 PROCEDURE — 2500000004 HC RX 250 GENERAL PHARMACY W/ HCPCS (ALT 636 FOR OP/ED): Performed by: STUDENT IN AN ORGANIZED HEALTH CARE EDUCATION/TRAINING PROGRAM

## 2024-12-10 PROCEDURE — 2500000001 HC RX 250 WO HCPCS SELF ADMINISTERED DRUGS (ALT 637 FOR MEDICARE OP): Performed by: HOSPITALIST

## 2024-12-10 PROCEDURE — 1200000002 HC GENERAL ROOM WITH TELEMETRY DAILY

## 2024-12-10 PROCEDURE — 85027 COMPLETE CBC AUTOMATED: CPT | Performed by: HOSPITALIST

## 2024-12-10 PROCEDURE — 2500000002 HC RX 250 W HCPCS SELF ADMINISTERED DRUGS (ALT 637 FOR MEDICARE OP, ALT 636 FOR OP/ED): Performed by: HOSPITALIST

## 2024-12-10 PROCEDURE — 84145 PROCALCITONIN (PCT): CPT | Mod: WESLAB | Performed by: STUDENT IN AN ORGANIZED HEALTH CARE EDUCATION/TRAINING PROGRAM

## 2024-12-10 PROCEDURE — 2500000001 HC RX 250 WO HCPCS SELF ADMINISTERED DRUGS (ALT 637 FOR MEDICARE OP): Performed by: INTERNAL MEDICINE

## 2024-12-10 PROCEDURE — 2500000002 HC RX 250 W HCPCS SELF ADMINISTERED DRUGS (ALT 637 FOR MEDICARE OP, ALT 636 FOR OP/ED): Performed by: INTERNAL MEDICINE

## 2024-12-10 PROCEDURE — 2500000005 HC RX 250 GENERAL PHARMACY W/O HCPCS: Performed by: INTERNAL MEDICINE

## 2024-12-10 PROCEDURE — 84132 ASSAY OF SERUM POTASSIUM: CPT | Performed by: INTERNAL MEDICINE

## 2024-12-10 PROCEDURE — 92610 EVALUATE SWALLOWING FUNCTION: CPT | Mod: GN | Performed by: SPEECH-LANGUAGE PATHOLOGIST

## 2024-12-10 PROCEDURE — 2500000004 HC RX 250 GENERAL PHARMACY W/ HCPCS (ALT 636 FOR OP/ED): Performed by: INTERNAL MEDICINE

## 2024-12-10 PROCEDURE — 94640 AIRWAY INHALATION TREATMENT: CPT

## 2024-12-10 PROCEDURE — 83880 ASSAY OF NATRIURETIC PEPTIDE: CPT | Performed by: STUDENT IN AN ORGANIZED HEALTH CARE EDUCATION/TRAINING PROGRAM

## 2024-12-10 PROCEDURE — 82947 ASSAY GLUCOSE BLOOD QUANT: CPT

## 2024-12-10 PROCEDURE — 2500000001 HC RX 250 WO HCPCS SELF ADMINISTERED DRUGS (ALT 637 FOR MEDICARE OP): Performed by: REGISTERED NURSE

## 2024-12-10 PROCEDURE — 99232 SBSQ HOSP IP/OBS MODERATE 35: CPT | Performed by: INTERNAL MEDICINE

## 2024-12-10 PROCEDURE — S4991 NICOTINE PATCH NONLEGEND: HCPCS | Performed by: INTERNAL MEDICINE

## 2024-12-10 PROCEDURE — 36415 COLL VENOUS BLD VENIPUNCTURE: CPT | Performed by: HOSPITALIST

## 2024-12-10 PROCEDURE — 80048 BASIC METABOLIC PNL TOTAL CA: CPT | Performed by: HOSPITALIST

## 2024-12-10 PROCEDURE — 36600 WITHDRAWAL OF ARTERIAL BLOOD: CPT

## 2024-12-10 RX ORDER — LORAZEPAM 0.5 MG/1
0.5 TABLET ORAL EVERY 4 HOURS PRN
Status: DISCONTINUED | OUTPATIENT
Start: 2024-12-10 | End: 2024-12-11

## 2024-12-10 RX ORDER — POLYETHYLENE GLYCOL 3350 17 G/17G
17 POWDER, FOR SOLUTION ORAL 2 TIMES DAILY
Status: DISCONTINUED | OUTPATIENT
Start: 2024-12-10 | End: 2024-12-17 | Stop reason: HOSPADM

## 2024-12-10 RX ADMIN — DEXTROSE MONOHYDRATE 500 MG: 50 INJECTION, SOLUTION INTRAVENOUS at 08:52

## 2024-12-10 RX ADMIN — PANTOPRAZOLE SODIUM 20 MG: 20 TABLET, DELAYED RELEASE ORAL at 06:49

## 2024-12-10 RX ADMIN — PIPERACILLIN SODIUM AND TAZOBACTAM SODIUM 3.38 G: 3; .375 INJECTION, SOLUTION INTRAVENOUS at 00:49

## 2024-12-10 RX ADMIN — POLYETHYLENE GLYCOL 3350 17 G: 17 POWDER, FOR SOLUTION ORAL at 20:44

## 2024-12-10 RX ADMIN — BENZOCAINE AND MENTHOL 1 LOZENGE: 15; 3.6 LOZENGE ORAL at 20:45

## 2024-12-10 RX ADMIN — ENOXAPARIN SODIUM 40 MG: 40 INJECTION SUBCUTANEOUS at 20:44

## 2024-12-10 RX ADMIN — PIPERACILLIN SODIUM AND TAZOBACTAM SODIUM 3.38 G: 3; .375 INJECTION, SOLUTION INTRAVENOUS at 06:48

## 2024-12-10 RX ADMIN — PREDNISONE 40 MG: 20 TABLET ORAL at 08:52

## 2024-12-10 RX ADMIN — IPRATROPIUM BROMIDE AND ALBUTEROL SULFATE 3 ML: 2.5; .5 SOLUTION RESPIRATORY (INHALATION) at 07:30

## 2024-12-10 RX ADMIN — BENZOCAINE AND MENTHOL 1 LOZENGE: 15; 3.6 LOZENGE ORAL at 00:49

## 2024-12-10 RX ADMIN — ACETYLCYSTEINE 600 MG: 200 SOLUTION ORAL; RESPIRATORY (INHALATION) at 15:00

## 2024-12-10 RX ADMIN — INSULIN LISPRO 10 UNITS: 100 INJECTION, SOLUTION INTRAVENOUS; SUBCUTANEOUS at 16:43

## 2024-12-10 RX ADMIN — IPRATROPIUM BROMIDE AND ALBUTEROL SULFATE 3 ML: 2.5; .5 SOLUTION RESPIRATORY (INHALATION) at 04:39

## 2024-12-10 RX ADMIN — PIPERACILLIN SODIUM AND TAZOBACTAM SODIUM 3.38 G: 3; .375 INJECTION, SOLUTION INTRAVENOUS at 12:08

## 2024-12-10 RX ADMIN — BENZONATATE 100 MG: 100 CAPSULE ORAL at 08:52

## 2024-12-10 RX ADMIN — INSULIN GLARGINE 22 UNITS: 100 INJECTION, SOLUTION SUBCUTANEOUS at 20:46

## 2024-12-10 RX ADMIN — LORAZEPAM 0.5 MG: 0.5 TABLET ORAL at 12:22

## 2024-12-10 RX ADMIN — HYDROCODONE BITARTRATE AND HOMATROPINE METHYLBROMIDE 5 ML: 5; 1.5 SOLUTION ORAL at 06:55

## 2024-12-10 RX ADMIN — INSULIN LISPRO 2 UNITS: 100 INJECTION, SOLUTION INTRAVENOUS; SUBCUTANEOUS at 08:51

## 2024-12-10 RX ADMIN — ACETYLCYSTEINE 600 MG: 200 SOLUTION ORAL; RESPIRATORY (INHALATION) at 11:20

## 2024-12-10 RX ADMIN — PIPERACILLIN SODIUM AND TAZOBACTAM SODIUM 3.38 G: 3; .375 INJECTION, SOLUTION INTRAVENOUS at 17:08

## 2024-12-10 RX ADMIN — ONDANSETRON 4 MG: 2 INJECTION INTRAMUSCULAR; INTRAVENOUS at 23:44

## 2024-12-10 RX ADMIN — Medication 4 L/MIN: at 19:15

## 2024-12-10 RX ADMIN — NICOTINE 1 PATCH: 21 PATCH, EXTENDED RELEASE TRANSDERMAL at 08:52

## 2024-12-10 RX ADMIN — PIPERACILLIN SODIUM AND TAZOBACTAM SODIUM 3.38 G: 3; .375 INJECTION, SOLUTION INTRAVENOUS at 23:57

## 2024-12-10 RX ADMIN — BENZONATATE 100 MG: 100 CAPSULE ORAL at 20:44

## 2024-12-10 RX ADMIN — BENZOCAINE AND MENTHOL 1 LOZENGE: 15; 3.6 LOZENGE ORAL at 08:52

## 2024-12-10 RX ADMIN — BENZONATATE 100 MG: 100 CAPSULE ORAL at 16:36

## 2024-12-10 RX ADMIN — IPRATROPIUM BROMIDE AND ALBUTEROL SULFATE 3 ML: 2.5; .5 SOLUTION RESPIRATORY (INHALATION) at 11:20

## 2024-12-10 RX ADMIN — LORAZEPAM 0.5 MG: 0.5 TABLET ORAL at 16:47

## 2024-12-10 RX ADMIN — DAPAGLIFLOZIN 10 MG: 10 TABLET, FILM COATED ORAL at 08:52

## 2024-12-10 RX ADMIN — LORAZEPAM 0.5 MG: 0.5 TABLET ORAL at 20:56

## 2024-12-10 RX ADMIN — BENZOCAINE AND MENTHOL 1 LOZENGE: 15; 3.6 LOZENGE ORAL at 23:57

## 2024-12-10 RX ADMIN — HYDROCODONE BITARTRATE AND HOMATROPINE METHYLBROMIDE 5 ML: 5; 1.5 SOLUTION ORAL at 20:43

## 2024-12-10 RX ADMIN — IPRATROPIUM BROMIDE AND ALBUTEROL SULFATE 3 ML: 2.5; .5 SOLUTION RESPIRATORY (INHALATION) at 19:15

## 2024-12-10 RX ADMIN — Medication 5 MG: at 00:49

## 2024-12-10 RX ADMIN — INSULIN LISPRO 6 UNITS: 100 INJECTION, SOLUTION INTRAVENOUS; SUBCUTANEOUS at 12:20

## 2024-12-10 RX ADMIN — CLOPIDOGREL BISULFATE 75 MG: 75 TABLET ORAL at 08:52

## 2024-12-10 RX ADMIN — TEMAZEPAM 15 MG: 15 CAPSULE ORAL at 20:44

## 2024-12-10 RX ADMIN — Medication 3 L/MIN: at 07:34

## 2024-12-10 RX ADMIN — ACETYLCYSTEINE 600 MG: 200 SOLUTION ORAL; RESPIRATORY (INHALATION) at 07:30

## 2024-12-10 RX ADMIN — IPRATROPIUM BROMIDE AND ALBUTEROL SULFATE 3 ML: 2.5; .5 SOLUTION RESPIRATORY (INHALATION) at 14:59

## 2024-12-10 RX ADMIN — ACETYLCYSTEINE 600 MG: 200 SOLUTION ORAL; RESPIRATORY (INHALATION) at 19:09

## 2024-12-10 ASSESSMENT — COGNITIVE AND FUNCTIONAL STATUS - GENERAL
TOILETING: A LITTLE
DAILY ACTIVITIY SCORE: 23
DAILY ACTIVITIY SCORE: 24
MOBILITY SCORE: 24
MOBILITY SCORE: 24

## 2024-12-10 ASSESSMENT — PAIN SCALES - GENERAL
PAINLEVEL_OUTOF10: 0 - NO PAIN
PAINLEVEL_OUTOF10: 7

## 2024-12-10 ASSESSMENT — PAIN - FUNCTIONAL ASSESSMENT
PAIN_FUNCTIONAL_ASSESSMENT: 0-10
PAIN_FUNCTIONAL_ASSESSMENT: 0-10

## 2024-12-10 NOTE — ASSESSMENT & PLAN NOTE
Resolved.  Off IV fluids.      She seems to have an issue with depression and anxiety right now.  I am ordering a blood gas.  If that looks okay in terms of acid-base I will order her a little bit of as needed Ativan.

## 2024-12-10 NOTE — NURSING NOTE
Pt randomly woke up and started coughing a lot and couldn't catch her breath she stated and asked for a breathing treatment.

## 2024-12-10 NOTE — NURSING NOTE
Assumed care of pt I.s at bedside, educated and encouraged. 02 4l, respirations unlabored, but sob. Slow deep breathing encouraged. Call light within reach.

## 2024-12-10 NOTE — NURSING NOTE
RT at bedside administering breathing tx. Pt is feeling a little better. Will be continuing to monitor.

## 2024-12-10 NOTE — PROGRESS NOTES
Alanna Hickman is a 44 y.o. female on day 2 of admission presenting with Bacterial pneumonia.      Subjective   Treating for pneumonia and COPD.  She is feeling very miserable today.  Mainly because of severe cough keeping her awake and causing rib pain.  She is getting maximal treatment including antibiotics steroids Tessalon Perles and Hycodan.       Objective she is alert she is able to appear undistressed until she starts talking about her symptoms.  Heart regular rate and rhythm  Lungs no ossicle wheeze  Abdomen soft nontender nondistended  Extremities no significant edema.    Last Recorded Vitals  /73 (BP Location: Left arm, Patient Position: Sitting)   Pulse 91   Temp 36.3 °C (97.3 °F) (Oral)   Resp 17   Wt 107 kg (235 lb)   SpO2 93%   Intake/Output last 3 Shifts:    Intake/Output Summary (Last 24 hours) at 12/10/2024 1045  Last data filed at 12/10/2024 0911  Gross per 24 hour   Intake 4722.25 ml   Output 1300 ml   Net 3422.25 ml       Admission Weight  Weight: 107 kg (235 lb) (12/08/24 1805)    Daily Weight  12/08/24 : 107 kg (235 lb)    Image Results        Assessment/Plan                  Assessment & Plan  Bacterial pneumonia  Continue current antibiotics.  Acute hypoxemic respiratory failure (Multi)  Secondary to multifocal pneumonia  On 4 L.  Wean oxygen as able.  Will order continuous tele  Pulmonology consult  Tobacco abuse  Discussed cessation briefly, and will order a nicotine patch.  DM (diabetes mellitus) (Multi)  A1c in 23 was 9.2.  Continue Tresiba, slightly lower dose while in the hospital and uptitrate as needed  Sliding scale.  Add on A1c.  Dehydration  Resolved.  Off IV fluids.      She seems to have an issue with depression and anxiety right now.  I am ordering a blood gas.  If that looks okay in terms of acid-base I will order her a little bit of as needed Ativan.                Soham Leal MD

## 2024-12-10 NOTE — NURSING NOTE
Pt stated that she still hasn't been able to get any sleep, even with the restoril that was ordered. She requested to have her antibiotic, a melatonin since the restoril didn't help, a sore throat lozenge, and a cup of hot tea with honey.   The antibiotic was scheduled for around this time already - zosyn running per order. Melatonin and throat lozenge given - see MAR.  Hot tea brought to pt (without honey because we do not have that stocked on the unit).  Extra pillows brought, as well, per pt request.    Pt denies any other needs at this time. Will be continuing to monitor.

## 2024-12-10 NOTE — CARE PLAN
The patient's goals for the shift include decrease anxiety, monitor 02    The clinical goals for the shift include Maintain stable SPO2, manage symptoms, get sleep

## 2024-12-10 NOTE — PROGRESS NOTES
"Pulmonary Daily Progress Note   Subjective    Alanna Hickman is a 44 y.o. year old female patient known with asthma, CVA on plavix, DM, smoker, possible spondyloarthropathy, severe SANGEETA, prescribed PAP therapy but had trouble with machine, admitted on 12/8/2024 with following acute hypoxic respiratory failure due to pneumonia. Pulmonary are consulted for the for respiratory failure and pneumonia.    Interval History:  Patient feeling slightly better, however remains on 3-4 L of oxygen   Cough remains present but has improved. 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     Meds    Scheduled medications  acetylcysteine, 3 mL, nebulization, 4x daily  azithromycin, 500 mg, intravenous, q24h  benzonatate, 100 mg, oral, TID  clopidogrel, 75 mg, oral, Daily  dapagliflozin propanediol, 10 mg, oral, Daily  enoxaparin, 40 mg, subcutaneous, q24h  insulin glargine, 22 Units, subcutaneous, Nightly  insulin lispro, 0-10 Units, subcutaneous, TID AC  ipratropium-albuteroL, 3 mL, nebulization, 4x daily  nicotine, 1 patch, transdermal, q24h MARY  oxygen, , inhalation, Continuous - Inhalation  pantoprazole, 20 mg, oral, Daily before breakfast  piperacillin-tazobactam, 3.375 g, intravenous, q6h  predniSONE, 40 mg, oral, Daily      Continuous medications     PRN medications  PRN medications: acetaminophen **OR** acetaminophen **OR** acetaminophen, benzocaine-menthol, hydrocodone-homatropine, ipratropium-albuteroL, melatonin, ondansetron ODT **OR** ondansetron, temazepam     Objective    Blood pressure 130/73, pulse 91, temperature 36.3 °C (97.3 °F), temperature source Oral, resp. rate 17, height 1.702 m (5' 7\"), weight 107 kg (235 lb), SpO2 93%.   Physical Exam   GENERAL:  not in respiratory distress  HEAD/SINUSES: On nasal cannula 3-4 L  NECK: Large neck  LUNGS: Decreased air entry, some wheezing and " rhonchi  CARDIAC: Regular rate and rhythm  EXTREMITIES: No edema,   NEURO: grossly normal mental status,   SKIN: Skin turgor normal.   PSYCH: Anxious    Intake/Output Summary (Last 24 hours) at 12/10/2024 1109  Last data filed at 12/10/2024 0911  Gross per 24 hour   Intake 4572.25 ml   Output 1100 ml   Net 3472.25 ml     Labs:   Results from last 72 hours   Lab Units 12/10/24  0517 12/09/24  0456 12/08/24  1813   SODIUM mmol/L 131* 131* 133*   POTASSIUM mmol/L 4.3 4.1 4.2   CHLORIDE mmol/L 99 98 96*   CO2 mmol/L 25 24 29   BUN mg/dL 10 9 10   CREATININE mg/dL 0.44* 0.45* 0.59   GLUCOSE mg/dL 166* 185* 126*   CALCIUM mg/dL 8.4* 8.3* 9.4   ANION GAP mmol/L 11 13 12   EGFR mL/min/1.73m*2 >90 >90 >90      Results from last 72 hours   Lab Units 12/10/24  0517 12/09/24  0456 12/08/24  1813   WBC AUTO x10*3/uL 8.8 6.9 7.9   HEMOGLOBIN g/dL 14.7 14.8 17.4*   HEMATOCRIT % 44.2 43.6 51.8*   PLATELETS AUTO x10*3/uL 229 180 219   NEUTROS PCT AUTO %  --   --  77.5   LYMPHS PCT AUTO %  --   --  10.5   MONOS PCT AUTO %  --   --  10.2   EOS PCT AUTO %  --   --  0.8               Micro/ID:   Lab Results   Component Value Date    URINECULTURE No significant growth 11/20/2023    BLOODCULT No growth at 1 day 12/08/2024    BLOODCULT No growth at 1 day 12/08/2024     Summary of key imaging results from the last 24 hours  CT chest shows tree-in-bud appearance bilaterally and bronchial Dilation        TTE 2022  There is normal left ventricular systolic function.  Estimated ejection fraction is 60-64%.  No cardiac source of embolus identified.    Impression   Alanna Hickman is a 44 y.o. year old female patient known with asthma, CVA on plavix, DM, smoker, possible spondyloarthropathy, admitted on 12/8/2024 with following acute hypoxic respiratory failure due to pneumonia. Pulmonary are consulted for the for respiratory failure and pneumonia.  Acute hypoxic respiratory failure due to bronchopneumonia most probably infectious in nature,  with history of dysphagia this could be also aspiration related however with viral prodrome before presentation favors infectious process viral versus atypical pneumonia versus bacterial.  She is not on chronic steroids however does use steroids intermittently for back pain.  History of asthma which does not seem very well-controlled, no PFTs on chart, on albuterol as needed at home. Recent exposure to parrots and she is worried that is what triggered her sx.   History of CVA and dysphagia  Smoker  History of asthma uncontrolled  SANGEETA uncontrolled, not treated as had trouble with her PAP machine     Strep, legionella and MRSA normal   Pro calcitonin normal   SLP bedside no dysphagia evidence     Recommendations   As follows:  Recommend continuing antibiotics however feel like ceftriaxone 2 g and Zithromax should be enough instead of empiric broad coverage, would recommend treating for 7 days minimum for beta lactam and 5 days for atypical coverage   If MRSA negative would recommend stopping vancomycin  sputum culture, RVP  Wean oxygen as tolerated and once on room air, recommend ambulatory pulse ox  Bronchopulmonary hygiene with Incentive spirometry  Monitor for hemoptysis  Prednisone for 5 days  Based on imaging finding less likely clinically picture related to hypersensitivity pneumonitis or psittacosis however if patient does not improve and no culture on sputum sample or + RVP might consider bronchoscopy  CXR tomorrow  For her nighttime sx of cough, and choking, this could be her severe SANGEETA, with hemoptysis episode trying to avoid PAP therapy but would recommend TTE and if that is normal then might need to start PAP therapy at night.   Scheduled neb therapy, would consider LABA/ICS on discharge  Will need PFTs outpatient and follow-up CAT scan in 4 to 6 weeks to ensure resolution  Will need outpatient pulmonary follow-up    Patricia Scruggs MD   12/10/24 at 11:09 AM     -Only the Medical problems listed under  impression were addressed today.   -Please contact primary team for all other concerns and medical problem  -Thank you for your consult       Disclaimer: Documentation completed with the information available at the time of input. Parts of this note may have been scribed or generated using voice dictation software, Dragon.  Homophonic errors may exist.  Please contact me directly if clarification is needed. The times in the chart may not be reflective of actual patient care times, interventions, or procedures. Documentation occurs after the physical care of the patient.

## 2024-12-10 NOTE — PROGRESS NOTES
Speech-Language Pathology    Speech-Language Pathology Clinical Swallow Evaluation    Patient Name: Alanna Hickman  MRN: [id number]  : 1980  Today's Date: 12/10/24  Start Time: 913  Stop Time: 928  Time Calculation (min): 15 min      ASSESSMENT  Impressions:  Functional  oral phase and no suspected pharyngeal phase dysphagia based on clinical swallow evaluation. No further dysphgia tx recommended.    PLAN  Recommendations:  Is MBSS recommended? No; no pharyngeal dysphagia suspected.  Solid consistency: Regular (IDDSI level 7)  Liquid consistency: Thin (IDDSI 0)  Medication administration: Whole, in thin liquid      Recommended frequency/duration:  Skilled SLP services recommended: No  Discharge recommendation: Home with no further SLP     Strengths: Motivation  Barriers to participation in tx: N/A    SUBJECTIVE    PMHx relevant to rehab: Principal Problem:    Bacterial pneumonia  Active Problems:    Acute hypoxemic respiratory failure (Multi)    Tobacco abuse    DM (diabetes mellitus) (Multi)    Dehydration    Chief complaint: Pt was admitted on 24 due to   Chief Complaint   Patient presents with    Shortness of Breath   . She was found to have Bacterial pneumonia.    Relevant imaging results:  CT angio chest 24  IMPRESSION:  1. Allowing for limitations of bolus timing, no evidence of pulmonary  embolism.  2. Apparent worsening of diffuse bilateral tree-in-bud type  infiltrates compatible with multifocal pneumonia. No pneumothorax or  pleural effusion. Adenopathy in the mediastinum presumed reactive      General Visit Information:     Patient Class: Inpatient  Living Environment:  home    Reason for Referral: r/o aspiration d/t PNA, h/o CVA  Prior to Session Communication: Bedside nurse    RN cleared pt to participate in session and reported no difficulty swallowing    Pt reported no difficulty swallowing    Date of Onset: 24  Date of Order: 24  [employer] Diet: regular and thin  liquids  Current Diet : regular and thin liquids    Status at time of evaluation:  Pain Assessment  Pain Assessment: 0-10  0-10 (Numeric) Pain Score: 7  Pain Type: Acute pain  Pain Location: Generalized    Pt was alert, pleasant, and cooperative for session.  Orientation: Oriented to situation and Ox4  Ability to follow functional commands: WFL  Nutritional status: Appears well-nourished/no concerns    Respiratory status: Supplemental oxygen via NC 4lpm  Baseline Vocal Quality: Normal  Volitional Cough: Strong  Volitional Swallow: Within Functional Limits  Patient positioning: Upright on edge of bed      OBJECTIVE  Clinical swallow evaluation completed and consisted of interview, oral motor assessment, and PO trials (4 oz thin liquids, 1/2 antonio vegie omlette).  ORAL PHASE: Natural dentition in good condition. Oral mucosa were pink, moist, and free of obvious lesions. Lingual strength and ROM were WFL. Labial strength/ROM were WFL. Labial seal was adequate. Mastication of regular solids was WFL A/P transit and oral clearance were WFL.  PHARYNGEAL PHASE: Laryngeal elevation was visualized or palpated with all trials, however adequacy of hyolaryngeal elevation/excursion cannot be determined at bedside. No immediate or delayed s/sx aspiration/penetration were observed with any consistencies.    Was 3oz challenge administered: Yes; pt drank 3oz of thin liquid in one attempt, without breaking, with no overt s/sx aspiration/penetration observed.      Treatment/Education:  Results and recommendations were relayed to: Patient and Bedside nurse  Education provided: Yes   Learner: Patient   Barriers to learning: None   Method of teaching: Verbal   Topic: role of ST and results of assessment   Outcome of teaching: Pt verbalized understanding  Treatment provided: No

## 2024-12-10 NOTE — NURSING NOTE
"Assumed care of pt around this time.  Pt is alert, sitting at the edge of her bed, watching tv.  Pt is on 4L O2 NC - breathing tx in place during BSSR.  IV fluids running per order.  Telemetry monitoring in place.  Pt denies any pain, but stated she is feeling \"pretty bad\" right now. She asked the day shift nurse if she was able to get anything ordered for sleep because pt has been having a difficult sleeping - day shift nurse said she had not heard back yet, and I told the pt I would finish getting report and I'd look into it.  Denies any other needs right now.  Will be continuing to monitor.  "

## 2024-12-11 ENCOUNTER — APPOINTMENT (OUTPATIENT)
Dept: CARDIOLOGY | Facility: HOSPITAL | Age: 44
End: 2024-12-11
Payer: COMMERCIAL

## 2024-12-11 ENCOUNTER — APPOINTMENT (OUTPATIENT)
Dept: RADIOLOGY | Facility: HOSPITAL | Age: 44
End: 2024-12-11
Payer: COMMERCIAL

## 2024-12-11 LAB
ANION GAP SERPL CALCULATED.3IONS-SCNC: 11 MMOL/L (ref 10–20)
AORTIC VALVE MEAN GRADIENT: 7 MMHG
AORTIC VALVE PEAK VELOCITY: 1.68 M/S
AV PEAK GRADIENT: 11 MMHG
AVA (PEAK VEL): 3.15 CM2
AVA (VTI): 3.33 CM2
BUN SERPL-MCNC: 10 MG/DL (ref 6–23)
CALCIUM SERPL-MCNC: 8.9 MG/DL (ref 8.6–10.3)
CHLORIDE SERPL-SCNC: 95 MMOL/L (ref 98–107)
CO2 SERPL-SCNC: 29 MMOL/L (ref 21–32)
CREAT SERPL-MCNC: 0.46 MG/DL (ref 0.5–1.05)
EGFRCR SERPLBLD CKD-EPI 2021: >90 ML/MIN/1.73M*2
EJECTION FRACTION APICAL 4 CHAMBER: 64.5
EJECTION FRACTION: 63 %
ERYTHROCYTE [DISTWIDTH] IN BLOOD BY AUTOMATED COUNT: 14.8 % (ref 11.5–14.5)
GLUCOSE BLD MANUAL STRIP-MCNC: 208 MG/DL (ref 74–99)
GLUCOSE BLD MANUAL STRIP-MCNC: 304 MG/DL (ref 74–99)
GLUCOSE BLD MANUAL STRIP-MCNC: 353 MG/DL (ref 74–99)
GLUCOSE BLD MANUAL STRIP-MCNC: 414 MG/DL (ref 74–99)
GLUCOSE BLD MANUAL STRIP-MCNC: 481 MG/DL (ref 74–99)
GLUCOSE SERPL-MCNC: 173 MG/DL (ref 74–99)
HCT VFR BLD AUTO: 48.9 % (ref 36–46)
HGB BLD-MCNC: 15.8 G/DL (ref 12–16)
LEFT ATRIUM VOLUME AREA LENGTH INDEX BSA: 26.1 ML/M2
LEFT VENTRICLE INTERNAL DIMENSION DIASTOLE: 4.2 CM (ref 3.5–6)
LEFT VENTRICULAR OUTFLOW TRACT DIAMETER: 2.06 CM
LV EJECTION FRACTION BIPLANE: 63 %
M PNEUMO IGM SER IA-ACNC: 1.22 U/L
MCH RBC QN AUTO: 31.6 PG (ref 26–34)
MCHC RBC AUTO-ENTMCNC: 32.3 G/DL (ref 32–36)
MCV RBC AUTO: 98 FL (ref 80–100)
MITRAL VALVE E/A RATIO: 0.79
MITRAL VALVE E/E' RATIO: 9.35
NRBC BLD-RTO: 0 /100 WBCS (ref 0–0)
PLATELET # BLD AUTO: 265 X10*3/UL (ref 150–450)
POTASSIUM SERPL-SCNC: 3.9 MMOL/L (ref 3.5–5.3)
RBC # BLD AUTO: 5 X10*6/UL (ref 4–5.2)
RIGHT VENTRICLE FREE WALL PEAK S': 15.8 CM/S
RIGHT VENTRICLE PEAK SYSTOLIC PRESSURE: 35.1 MMHG
SODIUM SERPL-SCNC: 131 MMOL/L (ref 136–145)
TRICUSPID ANNULAR PLANE SYSTOLIC EXCURSION: 2 CM
WBC # BLD AUTO: 8.9 X10*3/UL (ref 4.4–11.3)

## 2024-12-11 PROCEDURE — 2500000002 HC RX 250 W HCPCS SELF ADMINISTERED DRUGS (ALT 637 FOR MEDICARE OP, ALT 636 FOR OP/ED): Performed by: INTERNAL MEDICINE

## 2024-12-11 PROCEDURE — S4991 NICOTINE PATCH NONLEGEND: HCPCS | Performed by: INTERNAL MEDICINE

## 2024-12-11 PROCEDURE — 85027 COMPLETE CBC AUTOMATED: CPT | Performed by: HOSPITALIST

## 2024-12-11 PROCEDURE — 2500000002 HC RX 250 W HCPCS SELF ADMINISTERED DRUGS (ALT 637 FOR MEDICARE OP, ALT 636 FOR OP/ED): Performed by: HOSPITALIST

## 2024-12-11 PROCEDURE — 80048 BASIC METABOLIC PNL TOTAL CA: CPT | Performed by: HOSPITALIST

## 2024-12-11 PROCEDURE — 2500000001 HC RX 250 WO HCPCS SELF ADMINISTERED DRUGS (ALT 637 FOR MEDICARE OP): Performed by: REGISTERED NURSE

## 2024-12-11 PROCEDURE — C8929 TTE W OR WO FOL WCON,DOPPLER: HCPCS

## 2024-12-11 PROCEDURE — 82947 ASSAY GLUCOSE BLOOD QUANT: CPT

## 2024-12-11 PROCEDURE — 9420000001 HC RT PATIENT EDUCATION 5 MIN

## 2024-12-11 PROCEDURE — 2500000001 HC RX 250 WO HCPCS SELF ADMINISTERED DRUGS (ALT 637 FOR MEDICARE OP): Performed by: INTERNAL MEDICINE

## 2024-12-11 PROCEDURE — 1200000002 HC GENERAL ROOM WITH TELEMETRY DAILY

## 2024-12-11 PROCEDURE — 99233 SBSQ HOSP IP/OBS HIGH 50: CPT | Performed by: HOSPITALIST

## 2024-12-11 PROCEDURE — 71046 X-RAY EXAM CHEST 2 VIEWS: CPT

## 2024-12-11 PROCEDURE — 2500000004 HC RX 250 GENERAL PHARMACY W/ HCPCS (ALT 636 FOR OP/ED): Performed by: INTERNAL MEDICINE

## 2024-12-11 PROCEDURE — 2500000004 HC RX 250 GENERAL PHARMACY W/ HCPCS (ALT 636 FOR OP/ED): Performed by: HOSPITALIST

## 2024-12-11 PROCEDURE — 2500000004 HC RX 250 GENERAL PHARMACY W/ HCPCS (ALT 636 FOR OP/ED): Performed by: STUDENT IN AN ORGANIZED HEALTH CARE EDUCATION/TRAINING PROGRAM

## 2024-12-11 PROCEDURE — 36415 COLL VENOUS BLD VENIPUNCTURE: CPT | Performed by: HOSPITALIST

## 2024-12-11 PROCEDURE — 71046 X-RAY EXAM CHEST 2 VIEWS: CPT | Performed by: RADIOLOGY

## 2024-12-11 PROCEDURE — 99232 SBSQ HOSP IP/OBS MODERATE 35: CPT | Performed by: STUDENT IN AN ORGANIZED HEALTH CARE EDUCATION/TRAINING PROGRAM

## 2024-12-11 PROCEDURE — 94640 AIRWAY INHALATION TREATMENT: CPT

## 2024-12-11 PROCEDURE — 2500000001 HC RX 250 WO HCPCS SELF ADMINISTERED DRUGS (ALT 637 FOR MEDICARE OP): Performed by: HOSPITALIST

## 2024-12-11 PROCEDURE — 93306 TTE W/DOPPLER COMPLETE: CPT | Performed by: INTERNAL MEDICINE

## 2024-12-11 PROCEDURE — 2500000005 HC RX 250 GENERAL PHARMACY W/O HCPCS: Performed by: INTERNAL MEDICINE

## 2024-12-11 RX ORDER — GUAIFENESIN/DEXTROMETHORPHAN 100-10MG/5
5 SYRUP ORAL EVERY 6 HOURS PRN
Status: DISCONTINUED | OUTPATIENT
Start: 2024-12-11 | End: 2024-12-17 | Stop reason: HOSPADM

## 2024-12-11 RX ORDER — HYDROXYZINE HYDROCHLORIDE 25 MG/1
25 TABLET, FILM COATED ORAL EVERY 6 HOURS PRN
Status: DISCONTINUED | OUTPATIENT
Start: 2024-12-11 | End: 2024-12-17 | Stop reason: HOSPADM

## 2024-12-11 RX ORDER — ACETYLCYSTEINE 200 MG/ML
3 SOLUTION ORAL; RESPIRATORY (INHALATION)
Status: DISCONTINUED | OUTPATIENT
Start: 2024-12-12 | End: 2024-12-17 | Stop reason: HOSPADM

## 2024-12-11 RX ORDER — INSULIN GLARGINE 100 [IU]/ML
26 INJECTION, SOLUTION SUBCUTANEOUS NIGHTLY
Status: DISCONTINUED | OUTPATIENT
Start: 2024-12-11 | End: 2024-12-13

## 2024-12-11 RX ADMIN — Medication 6 L/MIN: at 07:30

## 2024-12-11 RX ADMIN — BENZONATATE 100 MG: 100 CAPSULE ORAL at 09:24

## 2024-12-11 RX ADMIN — DEXTROSE MONOHYDRATE 500 MG: 50 INJECTION, SOLUTION INTRAVENOUS at 09:24

## 2024-12-11 RX ADMIN — NICOTINE 1 PATCH: 21 PATCH, EXTENDED RELEASE TRANSDERMAL at 09:24

## 2024-12-11 RX ADMIN — ACETAMINOPHEN 650 MG: 325 TABLET ORAL at 20:22

## 2024-12-11 RX ADMIN — INSULIN LISPRO 4 UNITS: 100 INJECTION, SOLUTION INTRAVENOUS; SUBCUTANEOUS at 09:24

## 2024-12-11 RX ADMIN — LORAZEPAM 0.5 MG: 0.5 TABLET ORAL at 10:39

## 2024-12-11 RX ADMIN — ACETYLCYSTEINE 600 MG: 200 SOLUTION ORAL; RESPIRATORY (INHALATION) at 10:58

## 2024-12-11 RX ADMIN — INSULIN GLARGINE 26 UNITS: 100 INJECTION, SOLUTION SUBCUTANEOUS at 22:41

## 2024-12-11 RX ADMIN — PIPERACILLIN SODIUM AND TAZOBACTAM SODIUM 3.38 G: 3; .375 INJECTION, SOLUTION INTRAVENOUS at 06:17

## 2024-12-11 RX ADMIN — INSULIN LISPRO 10 UNITS: 100 INJECTION, SOLUTION INTRAVENOUS; SUBCUTANEOUS at 12:51

## 2024-12-11 RX ADMIN — Medication 4 L/MIN: at 20:37

## 2024-12-11 RX ADMIN — PANTOPRAZOLE SODIUM 20 MG: 20 TABLET, DELAYED RELEASE ORAL at 06:18

## 2024-12-11 RX ADMIN — PREDNISONE 40 MG: 20 TABLET ORAL at 09:24

## 2024-12-11 RX ADMIN — LORAZEPAM 0.5 MG: 0.5 TABLET ORAL at 06:18

## 2024-12-11 RX ADMIN — IPRATROPIUM BROMIDE AND ALBUTEROL SULFATE 3 ML: 2.5; .5 SOLUTION RESPIRATORY (INHALATION) at 07:25

## 2024-12-11 RX ADMIN — POLYETHYLENE GLYCOL 3350 17 G: 17 POWDER, FOR SOLUTION ORAL at 09:24

## 2024-12-11 RX ADMIN — IPRATROPIUM BROMIDE AND ALBUTEROL SULFATE 3 ML: 2.5; .5 SOLUTION RESPIRATORY (INHALATION) at 20:37

## 2024-12-11 RX ADMIN — PIPERACILLIN SODIUM AND TAZOBACTAM SODIUM 3.38 G: 3; .375 INJECTION, SOLUTION INTRAVENOUS at 12:53

## 2024-12-11 RX ADMIN — ACETYLCYSTEINE 600 MG: 200 SOLUTION ORAL; RESPIRATORY (INHALATION) at 14:59

## 2024-12-11 RX ADMIN — HYDROCODONE BITARTRATE AND HOMATROPINE METHYLBROMIDE 5 ML: 5; 1.5 SOLUTION ORAL at 06:17

## 2024-12-11 RX ADMIN — HYDROXYZINE HYDROCHLORIDE 25 MG: 25 TABLET ORAL at 19:17

## 2024-12-11 RX ADMIN — BENZOCAINE AND MENTHOL 1 LOZENGE: 15; 3.6 LOZENGE ORAL at 10:43

## 2024-12-11 RX ADMIN — ENOXAPARIN SODIUM 40 MG: 40 INJECTION SUBCUTANEOUS at 22:20

## 2024-12-11 RX ADMIN — BENZOCAINE AND MENTHOL 1 LOZENGE: 15; 3.6 LOZENGE ORAL at 22:56

## 2024-12-11 RX ADMIN — HYDROCODONE BITARTRATE AND HOMATROPINE METHYLBROMIDE 5 ML: 5; 1.5 SOLUTION ORAL at 13:02

## 2024-12-11 RX ADMIN — TEMAZEPAM 15 MG: 15 CAPSULE ORAL at 22:20

## 2024-12-11 RX ADMIN — IPRATROPIUM BROMIDE AND ALBUTEROL SULFATE 3 ML: 2.5; .5 SOLUTION RESPIRATORY (INHALATION) at 02:43

## 2024-12-11 RX ADMIN — ACETYLCYSTEINE 600 MG: 200 SOLUTION ORAL; RESPIRATORY (INHALATION) at 07:25

## 2024-12-11 RX ADMIN — PERFLUTREN 10 ML OF DILUTION: 6.52 INJECTION, SUSPENSION INTRAVENOUS at 14:32

## 2024-12-11 RX ADMIN — LORAZEPAM 0.5 MG: 0.5 TABLET ORAL at 01:17

## 2024-12-11 RX ADMIN — BENZONATATE 100 MG: 100 CAPSULE ORAL at 22:20

## 2024-12-11 RX ADMIN — BENZONATATE 100 MG: 100 CAPSULE ORAL at 14:35

## 2024-12-11 RX ADMIN — PIPERACILLIN SODIUM AND TAZOBACTAM SODIUM 3.38 G: 3; .375 INJECTION, SOLUTION INTRAVENOUS at 17:50

## 2024-12-11 RX ADMIN — DAPAGLIFLOZIN 10 MG: 10 TABLET, FILM COATED ORAL at 09:25

## 2024-12-11 RX ADMIN — HYDROCODONE BITARTRATE AND HOMATROPINE METHYLBROMIDE 5 ML: 5; 1.5 SOLUTION ORAL at 22:56

## 2024-12-11 RX ADMIN — CLOPIDOGREL BISULFATE 75 MG: 75 TABLET ORAL at 09:24

## 2024-12-11 RX ADMIN — ACETAMINOPHEN 650 MG: 325 TABLET ORAL at 01:13

## 2024-12-11 RX ADMIN — POLYETHYLENE GLYCOL 3350 17 G: 17 POWDER, FOR SOLUTION ORAL at 22:20

## 2024-12-11 RX ADMIN — INSULIN LISPRO 8 UNITS: 100 INJECTION, SOLUTION INTRAVENOUS; SUBCUTANEOUS at 17:49

## 2024-12-11 RX ADMIN — IPRATROPIUM BROMIDE AND ALBUTEROL SULFATE 3 ML: 2.5; .5 SOLUTION RESPIRATORY (INHALATION) at 10:58

## 2024-12-11 RX ADMIN — IPRATROPIUM BROMIDE AND ALBUTEROL SULFATE 3 ML: 2.5; .5 SOLUTION RESPIRATORY (INHALATION) at 14:59

## 2024-12-11 RX ADMIN — BENZOCAINE AND MENTHOL 1 LOZENGE: 15; 3.6 LOZENGE ORAL at 14:35

## 2024-12-11 ASSESSMENT — COGNITIVE AND FUNCTIONAL STATUS - GENERAL
MOBILITY SCORE: 19
DAILY ACTIVITIY SCORE: 23
CLIMB 3 TO 5 STEPS WITH RAILING: A LITTLE
DAILY ACTIVITIY SCORE: 24
DRESSING REGULAR LOWER BODY CLOTHING: A LITTLE
WALKING IN HOSPITAL ROOM: A LITTLE
MOVING TO AND FROM BED TO CHAIR: A LITTLE
MOBILITY SCORE: 21
CLIMB 3 TO 5 STEPS WITH RAILING: A LOT
MOVING TO AND FROM BED TO CHAIR: A LITTLE
WALKING IN HOSPITAL ROOM: A LITTLE
STANDING UP FROM CHAIR USING ARMS: A LITTLE

## 2024-12-11 ASSESSMENT — PAIN - FUNCTIONAL ASSESSMENT
PAIN_FUNCTIONAL_ASSESSMENT: FLACC (FACE, LEGS, ACTIVITY, CRY, CONSOLABILITY)
PAIN_FUNCTIONAL_ASSESSMENT: 0-10
PAIN_FUNCTIONAL_ASSESSMENT: 0-10

## 2024-12-11 ASSESSMENT — PAIN SCALES - GENERAL
PAINLEVEL_OUTOF10: 2
PAINLEVEL_OUTOF10: 10 - WORST POSSIBLE PAIN

## 2024-12-11 ASSESSMENT — PAIN DESCRIPTION - DESCRIPTORS: DESCRIPTORS: HEADACHE

## 2024-12-11 NOTE — PROGRESS NOTES
"Pulmonary Daily Progress Note   Subjective    Alanna Hickman is a 44 y.o. year old female patient known with asthma, CVA on plavix, DM, smoker, possible spondyloarthropathy, severe SANGEETA, prescribed PAP therapy but had trouble with machine, admitted on 12/8/2024 with following acute hypoxic respiratory failure due to pneumonia. Pulmonary are consulted for the for respiratory failure and pneumonia.    Interval History:  Patient feeling slightly better, however remains on 4-5 L of oxygen   Cough remains present but has improved. No hemoptysis   Using IS   Mycoplasma IgM positive  Mentions exposure to Pets parrots specifically and mentions that she took care of them as her mother was sick and she gets SOB when she is around them.     Meds    Scheduled medications  acetylcysteine, 3 mL, nebulization, 4x daily  azithromycin, 500 mg, intravenous, q24h  benzonatate, 100 mg, oral, TID  clopidogrel, 75 mg, oral, Daily  dapagliflozin propanediol, 10 mg, oral, Daily  enoxaparin, 40 mg, subcutaneous, q24h  insulin glargine, 22 Units, subcutaneous, Nightly  insulin lispro, 0-10 Units, subcutaneous, TID AC  ipratropium-albuteroL, 3 mL, nebulization, 4x daily  nicotine, 1 patch, transdermal, q24h MARY  oxygen, , inhalation, Continuous - Inhalation  pantoprazole, 20 mg, oral, Daily before breakfast  piperacillin-tazobactam, 3.375 g, intravenous, q6h  polyethylene glycol, 17 g, oral, BID  predniSONE, 40 mg, oral, Daily      Continuous medications     PRN medications  PRN medications: acetaminophen **OR** acetaminophen **OR** acetaminophen, benzocaine-menthol, hydrocodone-homatropine, ipratropium-albuteroL, LORazepam, melatonin, ondansetron ODT **OR** ondansetron, temazepam     Objective    Blood pressure 138/85, pulse 108, temperature 36.7 °C (98.1 °F), temperature source Oral, resp. rate 19, height 1.702 m (5' 7\"), weight 107 kg (235 lb), SpO2 90%.   Physical Exam   GENERAL: Mild tachypnea  HEAD/SINUSES: On nasal cannula 4-5 " L  NECK: Large neck  LUNGS: Decreased air entry, some wheezing and rhonchi  CARDIAC: Regular rate and rhythm  EXTREMITIES: No edema  NEURO: grossly normal mental status,   SKIN: Skin turgor normal.   PSYCH: Anxious    Intake/Output Summary (Last 24 hours) at 12/11/2024 1112  Last data filed at 12/11/2024 0947  Gross per 24 hour   Intake 1360 ml   Output 4025 ml   Net -2665 ml     Labs:   Results from last 72 hours   Lab Units 12/11/24  0507 12/10/24  0517 12/09/24  0456   SODIUM mmol/L 131* 131* 131*   POTASSIUM mmol/L 3.9 4.3 4.1   CHLORIDE mmol/L 95* 99 98   CO2 mmol/L 29 25 24   BUN mg/dL 10 10 9   CREATININE mg/dL 0.46* 0.44* 0.45*   GLUCOSE mg/dL 173* 166* 185*   CALCIUM mg/dL 8.9 8.4* 8.3*   ANION GAP mmol/L 11 11 13   EGFR mL/min/1.73m*2 >90 >90 >90      Results from last 72 hours   Lab Units 12/11/24  0507 12/10/24  0517 12/09/24  0456 12/08/24  1813   WBC AUTO x10*3/uL 8.9 8.8 6.9 7.9   HEMOGLOBIN g/dL 15.8 14.7 14.8 17.4*   HEMATOCRIT % 48.9* 44.2 43.6 51.8*   PLATELETS AUTO x10*3/uL 265 229 180 219   NEUTROS PCT AUTO %  --   --   --  77.5   LYMPHS PCT AUTO %  --   --   --  10.5   MONOS PCT AUTO %  --   --   --  10.2   EOS PCT AUTO %  --   --   --  0.8      Results from last 72 hours   Lab Units 12/10/24  1119   POCT PH, ARTERIAL pH 7.42   POCT PCO2, ARTERIAL mm Hg 46*   POCT PO2, ARTERIAL mm Hg 64*   POCT SO2, ARTERIAL % 93*          Micro/ID:   Lab Results   Component Value Date    URINECULTURE No significant growth 11/20/2023    BLOODCULT No growth at 2 days 12/08/2024    BLOODCULT No growth at 2 days 12/08/2024     Summary of key imaging results from the last 24 hours  CXR better on right     CT chest shows tree-in-bud appearance bilaterally and bronchial Dilation        TTE 2022  There is normal left ventricular systolic function.  Estimated ejection fraction is 60-64%.  No cardiac source of embolus identified.    Mycoplasma IgM positive      TTE 2024   1. Left ventricular ejection fraction is normal,  by visual estimate at 60-65%.   2. Spectral Doppler shows a Grade I (impaired relaxation pattern) of left ventricular diastolic filling with normal left atrial filling pressure.   3. There is normal right ventricular global systolic function.    Impression   Alanna Hickman is a 44 y.o. year old female patient known with asthma, CVA on plavix, DM, smoker, possible spondyloarthropathy, admitted on 12/8/2024 with following acute hypoxic respiratory failure due to pneumonia. Pulmonary are consulted for the for respiratory failure and pneumonia.  Acute hypoxic respiratory failure due to bronchopneumonia most probably infectious in nature, with history of dysphagia this could be also aspiration related however with viral prodrome before presentation favors infectious process viral versus atypical pneumonia versus bacterial.  She is not on chronic steroids however does use steroids intermittently for back pain.  History of asthma which does not seem very well-controlled, no PFTs on chart, on albuterol as needed at home. Recent exposure to parrots and she is worried that is what triggered her sx.   History of CVA and dysphagia  Smoker  History of asthma uncontrolled  SANGEETA uncontrolled, not treated as had trouble with her PAP machine   Mycoplasma pneumonia: IgM positive    Strep, legionella and MRSA normal   Pro calcitonin normal   SLP bedside no dysphagia evidence     Recommendations   As follows:  Recommend continuing antibiotics however feel like ceftriaxone 2 g and Zithromax should be enough instead of empiric broad coverage, would recommend treating for 7 days minimum for beta lactam and 5 days for atypical coverage   RVP  Wean oxygen as tolerated and once on room air, recommend ambulatory pulse ox  Bronchopulmonary hygiene with Incentive spirometry  Monitor for hemoptysis  Prednisone for 5 days however if remains to wheeze we could start a taper  Based on imaging finding less likely clinically picture related to  hypersensitivity pneumonitis or psittacosis and with IgM mycoplasma + will hold off on bronchoscopy. Might consider again if no improvement   For her nighttime sx of cough, and choking, recommend  PAP therapy at night ( we have to be careful as she had an episode of hemoptysis last week)  Scheduled neb therapy, would consider LABA/ICS on discharge  Will need PFTs outpatient and follow-up CAT scan in 4 to 6 weeks to ensure resolution  Will need outpatient pulmonary follow-up    Patricia Scruggs MD   12/11/24 at 11:12 AM     -Only the Medical problems listed under impression were addressed today.   -Please contact primary team for all other concerns and medical problem  -Thank you for your consult       Disclaimer: Documentation completed with the information available at the time of input. Parts of this note may have been scribed or generated using voice dictation software, Dragon.  Homophonic errors may exist.  Please contact me directly if clarification is needed. The times in the chart may not be reflective of actual patient care times, interventions, or procedures. Documentation occurs after the physical care of the patient.

## 2024-12-11 NOTE — NURSING NOTE
Assumed care of patient. Patient is laying in bed resting comfortably. No signs of distress. Call light is in place will continue to monitor.

## 2024-12-11 NOTE — CARE PLAN
The patient's goals for the shift include sleep and control cough    The clinical goals for the shift include Stable SPO2 level, manage cough, ATB therapy    Over the shift, the patient did make progress toward the following goals.     Problem: Pain - Adult  Goal: Verbalizes/displays adequate comfort level or baseline comfort level  Outcome: Progressing     Problem: Safety - Adult  Goal: Free from fall injury  Outcome: Progressing     Problem: Discharge Planning  Goal: Discharge to home or other facility with appropriate resources  Outcome: Progressing     Problem: Chronic Conditions and Co-morbidities  Goal: Patient's chronic conditions and co-morbidity symptoms are monitored and maintained or improved  Outcome: Progressing     Problem: Respiratory  Goal: Clear secretions with interventions this shift  Outcome: Progressing     Problem: Respiratory  Goal: Minimize anxiety/maximize coping throughout shift  Outcome: Progressing     Problem: Respiratory  Goal: Patent airway maintained this shift  Outcome: Progressing  Problem: Respiratory  Goal: Verbalize decreased shortness of breath this shift  Outcome: Progressing     Problem: Respiratory  Goal: Increase self care and/or family involvement in next 24 hours  Outcome: Progressing     Problem: Fall/Injury  Goal: Not fall by end of shift  Outcome: Progressing     Problem: Fall/Injury  Goal: Be free from injury by end of the shift  Outcome: Progressing     Problem: Fall/Injury  Goal: Use assistive devices by end of the shift  Outcome: Progressing     Problem: Fall/Injury  Goal: Pace activities to prevent fatigue by end of the shift  Outcome: Progressing     Problem: Diabetes  Goal: Achieve decreasing blood glucose levels by end of shift  Outcome: Progressing     Problem: Diabetes  Goal: Increase stability of blood glucose readings by end of shift  Outcome: Progressing     Problem: Diabetes  Goal: Maintain electrolyte levels within acceptable range throughout  shift  Outcome: Progressing     Problem: Diabetes  Goal: Maintain glucose levels >70mg/dl to <250mg/dl throughout shift  Outcome: Progressing

## 2024-12-11 NOTE — PROGRESS NOTES
Alanna Grant is a 44 y.o. female on day 3 of admission presenting with Bacterial pneumonia.      Subjective   Patient reports she still feels terrible. Frequent cough still and SOB. Very SOB overnight with episodes of desaturation. Put on 6L overnight but able to be put back on 4L.        Objective     Last Recorded Vitals  /86 (BP Location: Left arm, Patient Position: Lying)   Pulse 99   Temp 37 °C (98.6 °F) (Oral)   Resp 18   Wt 107 kg (235 lb)   SpO2 92%   Intake/Output last 3 Shifts:    Intake/Output Summary (Last 24 hours) at 12/11/2024 1618  Last data filed at 12/11/2024 1241  Gross per 24 hour   Intake 880 ml   Output 2225 ml   Net -1345 ml       Admission Weight  Weight: 107 kg (235 lb) (12/08/24 1805)    Daily Weight  12/08/24 : 107 kg (235 lb)    Image Results  Transthoracic Echo (TTE) [place] [address]             Phone [phone]    TRANSTHORACIC ECHOCARDIOGRAM REPORT    Patient Name:       ALANNA GRANT   Reading Physician:    [id number] Jax Maravilla DO  Study Date:         12/11/2024           Ordering Provider:    [id number] ALICE JOYCE  MRN/PID:            [id number]             Fellow:  Accession#:         [id number]         Nurse:  Date of Birth/Age:  1980 / 44      Sonographer:          Brit warren RDCS  Gender Assigned at  F                    Additional Staff:  Birth:  Height:             170.18 cm            Admit Date:  Weight:             106.59 kg            Admission Status:     Inpatient -                                                                 Routine  BSA / BMI:          2.17 m2 / 36.81      Department Location:  [facility]                      kg/m2  Blood Pressure: 138 /85 mmHg    Study  Type:    TRANSTHORACIC ECHO (TTE) COMPLETE  Diagnosis/ICD: Acute respiratory failure with hypoxia-J96.01  Indication:    acute hypoxemic respiratory failure, pneumonia  CPT Codes:     Echo Complete w Full [id number]    Patient History:  Pertinent History: HLD, stroke, altered mental status, DM, pneumonia, resp                     failure.    Study Detail: The following Echo studies were performed: 2D, M-Mode, Doppler and                color flow. Technically challenging study due to prominent lung                artifact. Definity used as a contrast agent for endocardial border                definition. Total contrast used for this procedure was 4 mL via IV                push.       PHYSICIAN INTERPRETATION:  Left Ventricle: Left ventricular ejection fraction is normal, by visual estimate at 60-65%. There are no regional wall motion abnormalities. The left ventricular cavity size is normal. There is mild increased septal and normal posterior left ventricular wall thickness. There is left ventricular concentric remodeling. Spectral Doppler shows a Grade I (impaired relaxation pattern) of left ventricular diastolic filling with normal left atrial filling pressure.  Left Atrium: The left atrium is normal in size.  Right Ventricle: The right ventricle is normal in size. There is normal right ventricular global systolic function.  Right Atrium: The right atrium is normal in size.  Aortic Valve: The aortic valve is trileaflet. The aortic valve dimensionless index is 1.00. There is no evidence of aortic valve regurgitation. The peak instantaneous gradient of the aortic valve is 11 mmHg. The mean gradient of the aortic valve is 7 mmHg.  Mitral Valve: The mitral valve is normal in structure. There is no evidence of mitral valve regurgitation.  Tricuspid Valve: The tricuspid valve is structurally normal. No evidence of tricuspid regurgitation.  Pulmonic Valve: The pulmonic valve is not well visualized. The pulmonic  valve regurgitation was not well visualized.  Pericardium: No pericardial effusion noted.  Aorta: The aortic root is normal.       CONCLUSIONS:   1. Left ventricular ejection fraction is normal, by visual estimate at 60-65%.   2. Spectral Doppler shows a Grade I (impaired relaxation pattern) of left ventricular diastolic filling with normal left atrial filling pressure.   3. There is normal right ventricular global systolic function.    QUANTITATIVE DATA SUMMARY:     2D MEASUREMENTS:           Normal Ranges:  LAs:             3.40 cm   (2.7-4.0cm)  IVSd:            1.16 cm   (0.6-1.1cm)  LVPWd:           0.93 cm   (0.6-1.1cm)  LVIDd:           4.20 cm   (3.9-5.9cm)  LVIDs:           2.86 cm  LV Mass Index:   67.2 g/m2  LV % FS          31.8 %       LA VOLUME:                    Normal Ranges:  LA Vol A4C:        52.1 ml    (22+/-6mL/m2)  LA Vol A2C:        57.5 ml  LA Vol BP:         56.6 ml  LA Vol Index A4C:  24.1 ml/m2  LA Vol Index A2C:  26.5 ml/m2  LA Vol Index BP:   26.1 ml/m2  LA Area A4C:       19.5 cm2  LA Area A2C:       19.8 cm2  LA Major Axis A4C: 6.2 cm  LA Major Axis A2C: 5.8 cm  LA Volume Index:   25.8 ml/m2  LA Vol A4C:        52.0 ml  LA Vol A2C:        57.0 ml  LA Vol Index BSA:  25.2 ml/m2       RA VOLUME BY A/L METHOD:            Normal Ranges:  RA Vol A4C:              25.7 ml    (8.3-19.5ml)  RA Vol Index A4C:        11.9 ml/m2  RA Area A4C:             12.3 cm2  RA Major Axis A4C:       5.0 cm       M-MODE MEASUREMENTS:         Normal Ranges:  LAs:                 3.52 cm (2.7-4.0cm)       AORTA MEASUREMENTS:         Normal Ranges:  Ao Sinus, d:        2.70 cm (2.1-3.5cm)  Ao STJ, d:          2.50 cm (1.7-3.4cm)  Asc Ao, d:          2.80 cm (2.1-3.4cm)       LV SYSTOLIC FUNCTION BY 2D PLANIMETRY (MOD):                       Normal Ranges:  EF-A4C View:    65 % (>=55%)  EF-A2C View:    61 %  EF-Biplane:     63 %  EF-Visual:      63 %  LV EF Reported: 63 %       LV DIASTOLIC FUNCTION:            Normal Ranges:  MV Peak E:             0.84 m/s  (0.7-1.2 m/s)  MV Peak A:             1.06 m/s  (0.42-0.7 m/s)  E/A Ratio:             0.79      (1.0-2.2)  MV e'                  0.099 m/s (>8.0)  MV lateral e'          0.12 m/s  MV medial e'           0.08 m/s  E/e' Ratio:            8.51      (<8.0)       MITRAL VALVE:          Normal Ranges:  MV DT:        311 msec (150-240msec)       AORTIC VALVE:                      Normal Ranges:  AoV Vmax:                1.68 m/s  (<=1.7m/s)  AoV Peak P.3 mmHg (<20mmHg)  AoV Mean P.9 mmHg  (1.7-11.5mmHg)  LVOT Max Darvin:            1.59 m/s  (<=1.1m/s)  AoV VTI:                 26.14 cm  (18-25cm)  LVOT VTI:                26.09 cm  LVOT Diameter:           2.06 cm   (1.8-2.4cm)  AoV Area, VTI:           3.33 cm2  (2.5-5.5cm2)  AoV Area,Vmax:           3.15 cm2  (2.5-4.5cm2)  AoV Dimensionless Index: 1.00       RIGHT VENTRICLE:  RV Basal 2.92 cm  RV Mid   1.90 cm  RV Major 6.9 cm  TAPSE:   19.9 mm  RV s'    0.16 m/s       TRICUSPID VALVE/RVSP:          Normal Ranges:  Peak TR Velocity:     2.83 m/s  RV Syst Pressure:     35 mmHg  (< 30mmHg)  IVC Diam:             1.47 cm       AORTA:  Asc Ao Diam 2.83 cm       [id number] Jax Maravilla DO  Electronically signed on 2024 at 4:02:55 PM       ** Final **  XR chest 2 views  Narrative: Interpreted By:  Lauren Canas,   STUDY:  XR CHEST 2 VIEWS 2024 8:55 am      INDICATION:  Signs/Symptoms:evaluate for ongoing hypoxia      COMPARISON:  2024      ACCESSION NUMBER(S):  [id number]      ORDERING CLINICIAN:  NIKOLAS OLSON      TECHNIQUE:  PA and lateral views of the chest      FINDINGS:  Cardiac silhouette is within normal limits. There is mild generalized  increased interstitial prominence in particular at the lung bases  with streaky appearing basilar infiltrate at the left lung base and  intervally improved at the right lung base. No dense airspace  consolidation. No pleural  effusions.                  Impression: 1. Streaky appearing bilateral basilar infiltrate with interval  improvement in right basilar infiltrate with mild interval worsening  of the left basilar infiltrate.      Signed by: Lauren Canas 12/11/2024 9:40 AM  Dictation workstation:   [id number]      Physical Exam  General: alert, no diaphoresis   Lungs: diminished, bibasilar rhonchi   Heart: RRR, no LE edema BL   GI: abdomen soft, nontender, nondistended, BS present   MSK: no joint effusion or deformity   Skin: no rashes, erythema, or ecchymosis   Neuro: grossly normal cognition, motor strength, sensation    Relevant Results               Assessment/Plan                  Assessment & Plan  Bacterial pneumonia  Continue current antibiotics.  Acute hypoxemic respiratory failure (Multi)  Secondary to multifocal pneumonia  On 4 L.  Wean oxygen as able.  Will order continuous pulsox  Pulmonology consult- discussed with Dr Scruggs. She recommended echocardiogram which I ordered- normal EF, mild diastolic dysfunction   Low threshold for checking ABG- if any somnolence or confusion RN is to notify me  Tobacco abuse  Discussed cessation, nicotine patch  DM (diabetes mellitus) (Multi)  A1c in 23 was 9.2.  Lantus reordered at higher dose for tonight and moving forward (also increased about 2 days ago)  Sliding scale.  Add on A1c.  Dehydration  Resolved.  Off IV fluids.    Patient remains very sick. Low threshold for ABG. May need vapotherm if desaturating further.              Alice Kevin DO

## 2024-12-11 NOTE — PROGRESS NOTES
Care transitions not consulted, pt anticipated to return home without skilled needs. Please consult if needed.      12/11/24 7209   Discharge Planning   Expected Discharge Disposition Home

## 2024-12-11 NOTE — NURSING NOTE
Assumed care of pt around this time.  Isolation precautions outside door.  Pt is alert, sitting in bed, watching tv, and getting a breathing treatment.  4L O2 NC noted.  No IV fluids running.  Pt is requesting cough syrup and sleep medication.  Day shift nurse said pt sputum sample was sent today and the results are pending.  She denies any other needs at the moment.  Call light and belongings within reach.  Will be continuing to monitor.

## 2024-12-11 NOTE — NURSING NOTE
"Pt randomly woke up again and started yelling for help . Pt was sitting in the tripod position, with labored breathing, and extremely diaphoretic and kept saying \"I can't breathe somebody help me\" \"I'm burning up why is it so hot I can't take this anymore.\"    Pt SPO2 was 76% on 4L O2 NC and after having her take some deep breaths through her nose, SPO2 was only fluctuating between 76-82%. I bumped the O2 to 5L, which brought her saturation to 92%. Called RT to come assess the pt.    RT put the pt on 6L O2 NC. SPO2 98%. Pt is feeling a little better and not working as hard to breathe.     Room temp lowered. Ice pack, cold wash cloths, and ice water brought to pt. Pt received a breathing treatment per RT and is now sitting in bed eating a popsicle. Call light and belongings within reach. Will be continuing to monitor.  "

## 2024-12-11 NOTE — ASSESSMENT & PLAN NOTE
Secondary to multifocal pneumonia  On 4 L.  Wean oxygen as able.  Will order continuous pulsox  Pulmonology consult- discussed with Dr Scruggs. She recommended echocardiogram which I ordered- normal EF, mild diastolic dysfunction   Low threshold for checking ABG- if any somnolence or confusion RN is to notify me

## 2024-12-11 NOTE — CARE PLAN
The patient's goals for the shift include sleep and control cough    The clinical goals for the shift include Stable SPO2 level, manage cough, ATB therapy      Problem: Pain - Adult  Goal: Verbalizes/displays adequate comfort level or baseline comfort level  Outcome: Progressing     Problem: Safety - Adult  Goal: Free from fall injury  Outcome: Progressing     Problem: Discharge Planning  Goal: Discharge to home or other facility with appropriate resources  Outcome: Progressing     Problem: Chronic Conditions and Co-morbidities  Goal: Patient's chronic conditions and co-morbidity symptoms are monitored and maintained or improved  Outcome: Progressing     Problem: Respiratory  Goal: Clear secretions with interventions this shift  Outcome: Progressing  Goal: Minimize anxiety/maximize coping throughout shift  Outcome: Progressing  Goal: Minimal/no exertional discomfort or dyspnea this shift  Outcome: Progressing  Goal: No signs of respiratory distress (eg. Use of accessory muscles. Peds grunting)  Outcome: Progressing  Goal: Patent airway maintained this shift  Outcome: Progressing  Goal: Tolerate pulmonary toileting this shift  Outcome: Progressing  Goal: Verbalize decreased shortness of breath this shift  Outcome: Progressing  Goal: Wean oxygen to maintain O2 saturation per order/standard this shift  Outcome: Progressing  Goal: Increase self care and/or family involvement in next 24 hours  Outcome: Progressing     Problem: Fall/Injury  Goal: Not fall by end of shift  Outcome: Progressing  Goal: Be free from injury by end of the shift  Outcome: Progressing  Goal: Verbalize understanding of personal risk factors for fall in the hospital  Outcome: Progressing  Goal: Verbalize understanding of risk factor reduction measures to prevent injury from fall in the home  Outcome: Progressing  Goal: Use assistive devices by end of the shift  Outcome: Progressing  Goal: Pace activities to prevent fatigue by end of the  shift  Outcome: Progressing     Problem: Diabetes  Goal: Achieve decreasing blood glucose levels by end of shift  Outcome: Progressing  Goal: Increase stability of blood glucose readings by end of shift  Outcome: Progressing  Goal: Decrease in ketones present in urine by end of shift  Outcome: Progressing  Goal: Maintain electrolyte levels within acceptable range throughout shift  Outcome: Progressing  Goal: Maintain glucose levels >70mg/dl to <250mg/dl throughout shift  Outcome: Progressing  Goal: No changes in neurological exam by end of shift  Outcome: Progressing  Goal: Learn about and adhere to nutrition recommendations by end of shift  Outcome: Progressing  Goal: Vital signs within normal range for age by end of shift  Outcome: Progressing  Goal: Increase self care and/or family involovement by end of shift  Outcome: Progressing  Goal: Receive DSME education by end of shift  Outcome: Progressing

## 2024-12-11 NOTE — NURSING NOTE
Pt randomly woke up a little before midnight and said she was very nauseous, dry heaving, and felt like she couldn't breathe (her O2 was off when I walked into the room). Checked her oxygen level and it was at 77% on room air (since she had taken the nasal cannula off). I put her oxygen back on and told her to take slow, deep breaths through her nose and her SPO2 came back to normal and she had already started feeling a little better. RT was also called to help adjust her oxygen (it was making a high pitched screeching noise with the humidification). Pt was crying and clearly anxious. PRN medications given per order - see MAR. I also stayed with the pt until she was calm enough and felt okay enough to lay back in bed to sleep. Pt was very appreciative and said she felt terrible for needing so much help - I told her to not feel bad about anything, that's what I'm here for and I want to see her feeling better!     Pt is resting quietly in bed trying to fall back asleep. Call light and belongings within reach. Will be continuing to monitor.

## 2024-12-11 NOTE — PROGRESS NOTES
Alanna Hickman is a 44 y.o. female on day 3 of admission presenting with Bacterial pneumonia.      Subjective   Patient is alert, sitting upright in bed, able to answer questions appropriately but reports some confusion. She states that her symptoms are worsening with subjective fevers and chills. She reports forceful productive coughing with clear sputum and one episode of hemoptysis with bright red speckles of blood. Patient notes chest wall and throat pain from coughing, as well as difficulty sleeping while lying flat. When she is upright, she states her breathing is improved but she is still dyspneic.     Patient also reports constipation with epigastric and LLQ discomfort. She has not had a BM in a few days. Her appetite is improved and she is hydrating well.        Objective     Last Recorded Vitals  /85 (BP Location: Left arm, Patient Position: Lying)   Pulse 108   Temp 36.7 °C (98.1 °F) (Oral)   Resp 19   Wt 107 kg (235 lb)   SpO2 90%   Intake/Output last 3 Shifts:    Intake/Output Summary (Last 24 hours) at 12/11/2024 1121  Last data filed at 12/11/2024 0947  Gross per 24 hour   Intake 1360 ml   Output 4025 ml   Net -2665 ml       Admission Weight  Weight: 107 kg (235 lb) (12/08/24 1805)    Daily Weight  12/08/24 : 107 kg (235 lb)    Image Results  XR chest 2 views  Narrative: Interpreted By:  Lauren Canas,   STUDY:  XR CHEST 2 VIEWS 12/11/2024 8:55 am      INDICATION:  Signs/Symptoms:evaluate for ongoing hypoxia      COMPARISON:  12/06/2024      ACCESSION NUMBER(S):  [id number]      ORDERING CLINICIAN:  NIKOLAS OLSON      TECHNIQUE:  PA and lateral views of the chest      FINDINGS:  Cardiac silhouette is within normal limits. There is mild generalized  increased interstitial prominence in particular at the lung bases  with streaky appearing basilar infiltrate at the left lung base and  intervally improved at the right lung base. No dense airspace  consolidation. No pleural  [facility] screening reviewed.  Screening for abdominal aortic aneurysm is normal/negative.  EKG shows no atrial fibrillation and a heart rate of 58 bpm.  Carotid arteries appear completely normal on the left carotid artery and mild plaque buildup on the right.  They are reading that is minimal plaque buildup that does not affect the blood flow.  Study for osteoporosis showed low probability of osteoporosis.  Peripheral arterial screening showed no evidence of peripheral arterial disease or blockage.  There is no need for any additional testing.   effusions.                  Impression: 1. Streaky appearing bilateral basilar infiltrate with interval  improvement in right basilar infiltrate with mild interval worsening  of the left basilar infiltrate.      Signed by: Lauren Canas 12/11/2024 9:40 AM  Dictation workstation:   [id number]      Physical Exam  Vitals reviewed.   Constitutional:       Appearance: She is ill-appearing and toxic-appearing.   HENT:      Head: Normocephalic and atraumatic.      Mouth/Throat:      Mouth: Mucous membranes are dry.      Pharynx: Posterior oropharyngeal erythema present.   Eyes:      Extraocular Movements: Extraocular movements intact.   Cardiovascular:      Rate and Rhythm: Regular rhythm. Tachycardia present.      Pulses: Normal pulses.      Heart sounds: Normal heart sounds.   Pulmonary:      Effort: Tachypnea present.      Breath sounds: Rales present.   Abdominal:      General: There is distension.      Tenderness: There is abdominal tenderness in the epigastric area and left lower quadrant.   Musculoskeletal:      Cervical back: Normal range of motion.   Skin:     General: Skin is warm and dry.   Neurological:      Mental Status: She is alert. She is confused.   Psychiatric:         Mood and Affect: Mood normal.         Behavior: Behavior normal.         Judgment: Judgment normal.         Relevant Results  Scheduled medications  acetylcysteine, 3 mL, nebulization, 4x daily  azithromycin, 500 mg, intravenous, q24h  benzonatate, 100 mg, oral, TID  clopidogrel, 75 mg, oral, Daily  dapagliflozin propanediol, 10 mg, oral, Daily  enoxaparin, 40 mg, subcutaneous, q24h  insulin glargine, 22 Units, subcutaneous, Nightly  insulin lispro, 0-10 Units, subcutaneous, TID AC  ipratropium-albuteroL, 3 mL, nebulization, 4x daily  nicotine, 1 patch, transdermal, q24h MARY  oxygen, , inhalation, Continuous - Inhalation  pantoprazole, 20 mg, oral, Daily before breakfast  piperacillin-tazobactam, 3.375 g, intravenous, q6h  polyethylene  glycol, 17 g, oral, BID  predniSONE, 40 mg, oral, Daily      Continuous medications     PRN medications  PRN medications: acetaminophen **OR** acetaminophen **OR** acetaminophen, benzocaine-menthol, hydrocodone-homatropine, ipratropium-albuteroL, LORazepam, melatonin, ondansetron ODT **OR** ondansetron, temazepam    Results for orders placed or performed during the hospital encounter of 12/08/24 (from the past 24 hours)   POCT GLUCOSE   Result Value Ref Range    POCT Glucose 279 (H) 74 - 99 mg/dL   POCT GLUCOSE   Result Value Ref Range    POCT Glucose 364 (H) 74 - 99 mg/dL   POCT GLUCOSE   Result Value Ref Range    POCT Glucose 271 (H) 74 - 99 mg/dL   Basic Metabolic Panel   Result Value Ref Range    Glucose 173 (H) 74 - 99 mg/dL    Sodium 131 (L) 136 - 145 mmol/L    Potassium 3.9 3.5 - 5.3 mmol/L    Chloride 95 (L) 98 - 107 mmol/L    Bicarbonate 29 21 - 32 mmol/L    Anion Gap 11 10 - 20 mmol/L    Urea Nitrogen 10 6 - 23 mg/dL    Creatinine 0.46 (L) 0.50 - 1.05 mg/dL    eGFR >90 >60 mL/min/1.73m*2    Calcium 8.9 8.6 - 10.3 mg/dL   CBC   Result Value Ref Range    WBC 8.9 4.4 - 11.3 x10*3/uL    nRBC 0.0 0.0 - 0.0 /100 WBCs    RBC 5.00 4.00 - 5.20 x10*6/uL    Hemoglobin 15.8 12.0 - 16.0 g/dL    Hematocrit 48.9 (H) 36.0 - 46.0 %    MCV 98 80 - 100 fL    MCH 31.6 26.0 - 34.0 pg    MCHC 32.3 32.0 - 36.0 g/dL    RDW 14.8 (H) 11.5 - 14.5 %    Platelets 265 150 - 450 x10*3/uL   POCT GLUCOSE   Result Value Ref Range    POCT Glucose 208 (H) 74 - 99 mg/dL     XR chest 2 views    Result Date: 12/11/2024  Interpreted By:  Lauren Canas, STUDY: XR CHEST 2 VIEWS 12/11/2024 8:55 am   INDICATION: Signs/Symptoms:evaluate for ongoing hypoxia   COMPARISON: 12/06/2024   ACCESSION NUMBER(S): [id number]   ORDERING CLINICIAN: NIKOLAS OLSON   TECHNIQUE: PA and lateral views of the chest   FINDINGS: Cardiac silhouette is within normal limits. There is mild generalized increased interstitial prominence in particular at the lung bases  with streaky appearing basilar infiltrate at the left lung base and intervally improved at the right lung base. No dense airspace consolidation. No pleural effusions.             1. Streaky appearing bilateral basilar infiltrate with interval improvement in right basilar infiltrate with mild interval worsening of the left basilar infiltrate.   Signed by: Lauren Canas 12/11/2024 9:40 AM Dictation workstation:   [id number]    ECG 12 lead    Result Date: 12/9/2024  Poor data quality, interpretation may be adversely affected Normal sinus rhythm Left anterior fascicular block Abnormal ECG No previous ECGs available Confirmed by Anand Lorenzo ([id number]) on 12/9/2024 10:16:19 AM    CT angio chest for pulmonary embolism    Result Date: 12/8/2024  Interpreted By:  Kd Johnson, STUDY: CT ANGIO CHEST FOR PULMONARY EMBOLISM;  12/8/2024 6:37 pm   INDICATION: Signs/Symptoms:cough, chest pain, recent pneumonia and on antibiotics. hypoxic in ER. concern for PE or progressive pneumonia.   COMPARISON: 08/05/2022 and recent radiographs   ACCESSION NUMBER(S): [id number]   ORDERING CLINICIAN: DIANE BARBOUR   TECHNIQUE: Helical data acquisition of the chest was obtained after intravenous administration of 75 ML Omnipaque 350 as per PE protocol. Images were reformatted in coronal and sagittal planes. Axial and coronal maximum intensity projection (MIP) images were created and reviewed.   FINDINGS: Bolus timing limits sensitivity . No central pulmonary embolism. Ascending aorta measures 3.1 cm. No pericardial effusion.   Hepatic steatosis. No adrenal abnormality.   Diffuse tree-in-bud type nodular infiltrate seen throughout the lungs compatible with multifocal pneumonia. Subjectively, this appears worse than the most radiograph allowing for differences in technique. No pneumothorax. No measurable pleural effusion.       Prominent hilar and mediastinal lymph nodes presumed reactive. Reference 1.4 cm subcarinal lymph node as well as  prominent AP lymph nodes.   Ascending aorta measures 3.1. No suspicious osseous abnormality       1. Allowing for limitations of bolus timing, no evidence of pulmonary embolism. 2. Apparent worsening of diffuse bilateral tree-in-bud type infiltrates compatible with multifocal pneumonia. No pneumothorax or pleural effusion. Adenopathy in the mediastinum presumed reactive   MACRO: None.   Signed by: Kd Johnson 12/8/2024 7:19 PM Dictation workstation:   [id number]    XR chest 2 views    Result Date: 12/7/2024  Interpreted By:  Saji Lira, STUDY: XR CHEST 2 VIEWS   INDICATION: Signs/Symptoms:cough.   COMPARISON: May 28   ACCESSION NUMBER(S): [id number]   ORDERING CLINICIAN: MARISOL GOODWIN   FINDINGS: New right basilar consolidation consistent with pneumonia. No effusion or pneumothorax.       New right basilar pneumonia.   Signed by: Saji Lira 12/7/2024 8:42 AM Dictation workstation:   [id number]    BI mammo bilateral screening tomosynthesis    Result Date: 11/13/2024  Interpreted By:  Neisha Lomas, STUDY: BI MAMMO BILATERAL SCREENING TOMOSYNTHESIS;  11/13/2024 11:46 am   ACCESSION NUMBER(S): [id number]   ORDERING CLINICIAN: TONE MENDOZA   INDICATION: Screening.   COMPARISON: All prior mammograms that are available at the time of interpretation.   FINDINGS: 2D and tomosynthesis images were reviewed at 1 mm slice thickness.   Density:  There are scattered areas of fibroglandular density.   No suspicious masses or calcifications are identified.       No mammographic evidence of malignancy.   BI-RADS CATEGORY: BI-RADS Category:  1 Negative. Recommendation:  Annual Screening. Recommended Date:  1 Year. Laterality:  Bilateral.       For any future breast imaging appointments, please call [phone]).     MACRO: None   Signed by: Neisha Lomas 11/13/2024 3:47 PM Dictation workstation:   [id number]                Assessment/Plan    Impression: This is a 44 year old female on day  3 of admission for acute hypoxic respiratory failure due to pneumonia.     Bacterial pneumonia. Diffuse rales on lung auscultation, dyspnea, and subjective fevers and chills. Strep pneumonia, legionella Ag, MRSA negative. CT chest 12/11:  Streaky appearing bilateral basilar infiltrate with interval improvement in right basilar infiltrate with mild interval worsening of the left basilar infiltrate.  Continue with current course of Azithromycin, Zosyn, Predisone. Sputum culture and respiratory viral panel recommended by pulmonology. Consider ID consult.   Acute hypoxic respiratory failure, secondary to multifocal pneumonia. Patient reports progressive dyspnea, currently on 4L oxygen. Continue Duo-Neb.  Functional constipation. Patient reports LLQ discomfort. Continue Miralax.  Cough. Patient reports progressive throat and chest wall discomfort, hemoptysis. Secondary to pneumonia. Continue Mucomyst, benzonatate.  Tobacco abuse. Continue nicotine patch  Type 2 DM. Continue Farxiga, sliding scale.   DVT and GI prophylaxis, continue Plavix, PPI.     Assessment & Plan  Bacterial pneumonia  Continue current antibiotics.  Acute hypoxemic respiratory failure (Multi)  Secondary to multifocal pneumonia  On 4 L.  Wean oxygen as able.  Will order continuous tele  Pulmonology consult  Tobacco abuse  Discussed cessation briefly, and will order a nicotine patch.  DM (diabetes mellitus) (Multi)  A1c in 23 was 9.2.  Continue Tresiba, slightly lower dose while in the hospital and uptitrate as needed  Sliding scale.  Add on A1c.  Dehydration  Resolved.  Off IV fluids.                DANIEL LO

## 2024-12-11 NOTE — ASSESSMENT & PLAN NOTE
A1c in 23 was 9.2.  Lantus reordered at higher dose for tonight and moving forward (also increased about 2 days ago)  Sliding scale.  Add on A1c.

## 2024-12-12 ENCOUNTER — APPOINTMENT (OUTPATIENT)
Dept: RADIOLOGY | Facility: HOSPITAL | Age: 44
End: 2024-12-12
Payer: COMMERCIAL

## 2024-12-12 LAB
ADENOVIRUS RVP, VIRC: NOT DETECTED
ANION GAP BLDA CALCULATED.4IONS-SCNC: 3 MMO/L (ref 10–25)
ANION GAP SERPL CALCULATED.3IONS-SCNC: 12 MMOL/L (ref 10–20)
APPARATUS: ABNORMAL
ARTERIAL PATENCY WRIST A: POSITIVE
BACTERIA SPEC RESP CULT: NORMAL
BASE EXCESS BLDA CALC-SCNC: 9.6 MMOL/L (ref -2–3)
BODY TEMPERATURE: 37 DEGREES CELSIUS
BUN SERPL-MCNC: 12 MG/DL (ref 6–23)
CA-I BLDA-SCNC: 1.22 MMOL/L (ref 1.1–1.33)
CALCIUM SERPL-MCNC: 9 MG/DL (ref 8.6–10.3)
CHLORIDE BLDA-SCNC: 92 MMOL/L (ref 98–107)
CHLORIDE SERPL-SCNC: 92 MMOL/L (ref 98–107)
CO2 SERPL-SCNC: 32 MMOL/L (ref 21–32)
CREAT SERPL-MCNC: 0.49 MG/DL (ref 0.5–1.05)
EGFRCR SERPLBLD CKD-EPI 2021: >90 ML/MIN/1.73M*2
ENTEROVIRUS/RHINOVIRUS RVP, VIRC: NOT DETECTED
ERYTHROCYTE [DISTWIDTH] IN BLOOD BY AUTOMATED COUNT: 14.6 % (ref 11.5–14.5)
FLOW: 10 LPM
GLUCOSE BLD MANUAL STRIP-MCNC: 158 MG/DL (ref 74–99)
GLUCOSE BLD MANUAL STRIP-MCNC: 209 MG/DL (ref 74–99)
GLUCOSE BLD MANUAL STRIP-MCNC: 357 MG/DL (ref 74–99)
GLUCOSE BLD MANUAL STRIP-MCNC: 400 MG/DL (ref 74–99)
GLUCOSE BLDA-MCNC: 191 MG/DL (ref 74–99)
GLUCOSE SERPL-MCNC: 216 MG/DL (ref 74–99)
GRAM STN SPEC: NORMAL
GRAM STN SPEC: NORMAL
HCO3 BLDA-SCNC: 36.3 MMOL/L (ref 22–26)
HCT VFR BLD AUTO: 44.6 % (ref 36–46)
HCT VFR BLD EST: 44 % (ref 36–46)
HGB BLD-MCNC: 14.8 G/DL (ref 12–16)
HGB BLDA-MCNC: 14.8 G/DL (ref 12–16)
HUMAN BOCAVIRUS RVP, VIRC: NOT DETECTED
HUMAN CORONAVIRUS RVP, VIRC: NOT DETECTED
INFLUENZA A , VIRC: NOT DETECTED
INFLUENZA A H1N1-09 , VIRC: NOT DETECTED
INFLUENZA B PCR, VIRC: NOT DETECTED
INHALED O2 CONCENTRATION: 60 %
LACTATE BLDA-SCNC: 0.8 MMOL/L (ref 0.4–2)
MCH RBC QN AUTO: 31.9 PG (ref 26–34)
MCHC RBC AUTO-ENTMCNC: 33.2 G/DL (ref 32–36)
MCV RBC AUTO: 96 FL (ref 80–100)
METAPNEUMOVIRUS , VIRC: NOT DETECTED
NRBC BLD-RTO: 0 /100 WBCS (ref 0–0)
OXYHGB MFR BLDA: 92.7 % (ref 94–98)
PARAINFLUENZA PCR, VIRC: NOT DETECTED
PCO2 BLDA: 56 MM HG (ref 38–42)
PH BLDA: 7.42 PH (ref 7.38–7.42)
PLATELET # BLD AUTO: 258 X10*3/UL (ref 150–450)
PO2 BLDA: 67 MM HG (ref 85–95)
POTASSIUM BLDA-SCNC: 4.6 MMOL/L (ref 3.5–5.3)
POTASSIUM SERPL-SCNC: 4.6 MMOL/L (ref 3.5–5.3)
RBC # BLD AUTO: 4.64 X10*6/UL (ref 4–5.2)
RSV PCR, RVP, VIRC: NOT DETECTED
SAO2 % BLDA: 95 % (ref 94–100)
SODIUM BLDA-SCNC: 127 MMOL/L (ref 136–145)
SODIUM SERPL-SCNC: 131 MMOL/L (ref 136–145)
SPECIMEN DRAWN FROM PATIENT: ABNORMAL
WBC # BLD AUTO: 9.2 X10*3/UL (ref 4.4–11.3)

## 2024-12-12 PROCEDURE — 2500000004 HC RX 250 GENERAL PHARMACY W/ HCPCS (ALT 636 FOR OP/ED): Performed by: INTERNAL MEDICINE

## 2024-12-12 PROCEDURE — 84132 ASSAY OF SERUM POTASSIUM: CPT | Performed by: HOSPITALIST

## 2024-12-12 PROCEDURE — 71250 CT THORAX DX C-: CPT

## 2024-12-12 PROCEDURE — 94669 MECHANICAL CHEST WALL OSCILL: CPT

## 2024-12-12 PROCEDURE — 2500000004 HC RX 250 GENERAL PHARMACY W/ HCPCS (ALT 636 FOR OP/ED): Performed by: STUDENT IN AN ORGANIZED HEALTH CARE EDUCATION/TRAINING PROGRAM

## 2024-12-12 PROCEDURE — S4991 NICOTINE PATCH NONLEGEND: HCPCS | Performed by: INTERNAL MEDICINE

## 2024-12-12 PROCEDURE — 2500000005 HC RX 250 GENERAL PHARMACY W/O HCPCS: Performed by: HOSPITALIST

## 2024-12-12 PROCEDURE — 5A0945A ASSISTANCE WITH RESPIRATORY VENTILATION, 24-96 CONSECUTIVE HOURS, HIGH NASAL FLOW/VELOCITY: ICD-10-PCS | Performed by: INTERNAL MEDICINE

## 2024-12-12 PROCEDURE — 99233 SBSQ HOSP IP/OBS HIGH 50: CPT | Performed by: INTERNAL MEDICINE

## 2024-12-12 PROCEDURE — 84132 ASSAY OF SERUM POTASSIUM: CPT | Performed by: INTERNAL MEDICINE

## 2024-12-12 PROCEDURE — 36600 WITHDRAWAL OF ARTERIAL BLOOD: CPT

## 2024-12-12 PROCEDURE — 99291 CRITICAL CARE FIRST HOUR: CPT | Performed by: HOSPITALIST

## 2024-12-12 PROCEDURE — 2500000002 HC RX 250 W HCPCS SELF ADMINISTERED DRUGS (ALT 637 FOR MEDICARE OP, ALT 636 FOR OP/ED): Performed by: INTERNAL MEDICINE

## 2024-12-12 PROCEDURE — 94760 N-INVAS EAR/PLS OXIMETRY 1: CPT

## 2024-12-12 PROCEDURE — 80048 BASIC METABOLIC PNL TOTAL CA: CPT | Performed by: HOSPITALIST

## 2024-12-12 PROCEDURE — 85027 COMPLETE CBC AUTOMATED: CPT | Performed by: HOSPITALIST

## 2024-12-12 PROCEDURE — 82947 ASSAY GLUCOSE BLOOD QUANT: CPT

## 2024-12-12 PROCEDURE — 94660 CPAP INITIATION&MGMT: CPT

## 2024-12-12 PROCEDURE — 1200000002 HC GENERAL ROOM WITH TELEMETRY DAILY

## 2024-12-12 PROCEDURE — 71250 CT THORAX DX C-: CPT | Performed by: RADIOLOGY

## 2024-12-12 PROCEDURE — 2500000002 HC RX 250 W HCPCS SELF ADMINISTERED DRUGS (ALT 637 FOR MEDICARE OP, ALT 636 FOR OP/ED): Performed by: HOSPITALIST

## 2024-12-12 PROCEDURE — 2500000001 HC RX 250 WO HCPCS SELF ADMINISTERED DRUGS (ALT 637 FOR MEDICARE OP): Performed by: REGISTERED NURSE

## 2024-12-12 PROCEDURE — 2500000005 HC RX 250 GENERAL PHARMACY W/O HCPCS: Performed by: INTERNAL MEDICINE

## 2024-12-12 PROCEDURE — 2500000001 HC RX 250 WO HCPCS SELF ADMINISTERED DRUGS (ALT 637 FOR MEDICARE OP): Performed by: HOSPITALIST

## 2024-12-12 PROCEDURE — 2500000001 HC RX 250 WO HCPCS SELF ADMINISTERED DRUGS (ALT 637 FOR MEDICARE OP): Performed by: INTERNAL MEDICINE

## 2024-12-12 PROCEDURE — 94640 AIRWAY INHALATION TREATMENT: CPT

## 2024-12-12 PROCEDURE — 36415 COLL VENOUS BLD VENIPUNCTURE: CPT | Performed by: HOSPITALIST

## 2024-12-12 RX ORDER — TRAZODONE HYDROCHLORIDE 100 MG/1
100 TABLET ORAL NIGHTLY PRN
Status: DISCONTINUED | OUTPATIENT
Start: 2024-12-12 | End: 2024-12-13

## 2024-12-12 RX ORDER — AZITHROMYCIN 500 MG/1
500 TABLET, FILM COATED ORAL
Status: DISCONTINUED | OUTPATIENT
Start: 2024-12-12 | End: 2024-12-16

## 2024-12-12 RX ORDER — AZITHROMYCIN 500 MG/1
500 TABLET, FILM COATED ORAL
Status: DISCONTINUED | OUTPATIENT
Start: 2024-12-13 | End: 2024-12-12

## 2024-12-12 RX ORDER — OXYCODONE HYDROCHLORIDE 5 MG/1
5 TABLET ORAL EVERY 6 HOURS PRN
Status: DISCONTINUED | OUTPATIENT
Start: 2024-12-12 | End: 2024-12-13

## 2024-12-12 RX ADMIN — HYDROCODONE BITARTRATE AND HOMATROPINE METHYLBROMIDE 5 ML: 5; 1.5 SOLUTION ORAL at 06:26

## 2024-12-12 RX ADMIN — PIPERACILLIN SODIUM AND TAZOBACTAM SODIUM 3.38 G: 3; .375 INJECTION, SOLUTION INTRAVENOUS at 06:26

## 2024-12-12 RX ADMIN — DAPAGLIFLOZIN 10 MG: 10 TABLET, FILM COATED ORAL at 09:15

## 2024-12-12 RX ADMIN — ENOXAPARIN SODIUM 40 MG: 40 INJECTION SUBCUTANEOUS at 20:15

## 2024-12-12 RX ADMIN — PIPERACILLIN SODIUM AND TAZOBACTAM SODIUM 3.38 G: 3; .375 INJECTION, SOLUTION INTRAVENOUS at 12:14

## 2024-12-12 RX ADMIN — CLOPIDOGREL BISULFATE 75 MG: 75 TABLET ORAL at 09:15

## 2024-12-12 RX ADMIN — INSULIN LISPRO 10 UNITS: 100 INJECTION, SOLUTION INTRAVENOUS; SUBCUTANEOUS at 17:15

## 2024-12-12 RX ADMIN — OXYCODONE 5 MG: 5 TABLET ORAL at 12:32

## 2024-12-12 RX ADMIN — BENZONATATE 100 MG: 100 CAPSULE ORAL at 20:15

## 2024-12-12 RX ADMIN — Medication 5 MG: at 00:37

## 2024-12-12 RX ADMIN — TEMAZEPAM 15 MG: 15 CAPSULE ORAL at 20:16

## 2024-12-12 RX ADMIN — Medication 40 L/MIN: at 21:36

## 2024-12-12 RX ADMIN — POLYETHYLENE GLYCOL 3350 17 G: 17 POWDER, FOR SOLUTION ORAL at 09:15

## 2024-12-12 RX ADMIN — PREDNISONE 40 MG: 20 TABLET ORAL at 09:15

## 2024-12-12 RX ADMIN — HYDROXYZINE HYDROCHLORIDE 25 MG: 25 TABLET ORAL at 12:32

## 2024-12-12 RX ADMIN — IPRATROPIUM BROMIDE AND ALBUTEROL SULFATE 3 ML: 2.5; .5 SOLUTION RESPIRATORY (INHALATION) at 21:27

## 2024-12-12 RX ADMIN — Medication 10 L/MIN: at 07:10

## 2024-12-12 RX ADMIN — IPRATROPIUM BROMIDE AND ALBUTEROL SULFATE 3 ML: 2.5; .5 SOLUTION RESPIRATORY (INHALATION) at 07:09

## 2024-12-12 RX ADMIN — HYDROXYZINE HYDROCHLORIDE 25 MG: 25 TABLET ORAL at 06:26

## 2024-12-12 RX ADMIN — BENZONATATE 100 MG: 100 CAPSULE ORAL at 15:14

## 2024-12-12 RX ADMIN — BENZONATATE 100 MG: 100 CAPSULE ORAL at 09:15

## 2024-12-12 RX ADMIN — INSULIN GLARGINE 26 UNITS: 100 INJECTION, SOLUTION SUBCUTANEOUS at 20:28

## 2024-12-12 RX ADMIN — IPRATROPIUM BROMIDE AND ALBUTEROL SULFATE 3 ML: 2.5; .5 SOLUTION RESPIRATORY (INHALATION) at 11:38

## 2024-12-12 RX ADMIN — ACETYLCYSTEINE 600 MG: 200 SOLUTION ORAL; RESPIRATORY (INHALATION) at 07:09

## 2024-12-12 RX ADMIN — OXYCODONE 5 MG: 5 TABLET ORAL at 18:35

## 2024-12-12 RX ADMIN — Medication 60 PERCENT: at 15:03

## 2024-12-12 RX ADMIN — IPRATROPIUM BROMIDE AND ALBUTEROL SULFATE 3 ML: 2.5; .5 SOLUTION RESPIRATORY (INHALATION) at 14:51

## 2024-12-12 RX ADMIN — PIPERACILLIN SODIUM AND TAZOBACTAM SODIUM 3.38 G: 3; .375 INJECTION, SOLUTION INTRAVENOUS at 17:59

## 2024-12-12 RX ADMIN — PANTOPRAZOLE SODIUM 20 MG: 20 TABLET, DELAYED RELEASE ORAL at 06:26

## 2024-12-12 RX ADMIN — HYDROXYZINE HYDROCHLORIDE 25 MG: 25 TABLET ORAL at 20:11

## 2024-12-12 RX ADMIN — HYDROCODONE BITARTRATE AND HOMATROPINE METHYLBROMIDE 5 ML: 5; 1.5 SOLUTION ORAL at 20:11

## 2024-12-12 RX ADMIN — NICOTINE 1 PATCH: 21 PATCH, EXTENDED RELEASE TRANSDERMAL at 09:15

## 2024-12-12 RX ADMIN — INSULIN LISPRO 4 UNITS: 100 INJECTION, SOLUTION INTRAVENOUS; SUBCUTANEOUS at 09:17

## 2024-12-12 RX ADMIN — ACETYLCYSTEINE 600 MG: 200 SOLUTION ORAL; RESPIRATORY (INHALATION) at 21:20

## 2024-12-12 RX ADMIN — AZITHROMYCIN 500 MG: 500 TABLET, FILM COATED ORAL at 10:29

## 2024-12-12 RX ADMIN — PIPERACILLIN SODIUM AND TAZOBACTAM SODIUM 3.38 G: 3; .375 INJECTION, SOLUTION INTRAVENOUS at 00:37

## 2024-12-12 RX ADMIN — Medication 60 PERCENT: at 21:30

## 2024-12-12 RX ADMIN — BENZOCAINE AND MENTHOL 1 LOZENGE: 15; 3.6 LOZENGE ORAL at 18:35

## 2024-12-12 ASSESSMENT — COGNITIVE AND FUNCTIONAL STATUS - GENERAL
DRESSING REGULAR UPPER BODY CLOTHING: A LITTLE
MOBILITY SCORE: 24
EATING MEALS: A LITTLE
DRESSING REGULAR LOWER BODY CLOTHING: A LITTLE
HELP NEEDED FOR BATHING: A LITTLE
TOILETING: A LITTLE
MOBILITY SCORE: 24
DAILY ACTIVITIY SCORE: 19
DRESSING REGULAR LOWER BODY CLOTHING: A LITTLE
DAILY ACTIVITIY SCORE: 22
HELP NEEDED FOR BATHING: A LITTLE

## 2024-12-12 ASSESSMENT — PAIN DESCRIPTION - LOCATION: LOCATION: CHEST

## 2024-12-12 ASSESSMENT — PAIN - FUNCTIONAL ASSESSMENT
PAIN_FUNCTIONAL_ASSESSMENT: 0-10
PAIN_FUNCTIONAL_ASSESSMENT: FLACC (FACE, LEGS, ACTIVITY, CRY, CONSOLABILITY)
PAIN_FUNCTIONAL_ASSESSMENT: FLACC (FACE, LEGS, ACTIVITY, CRY, CONSOLABILITY)
PAIN_FUNCTIONAL_ASSESSMENT: 0-10

## 2024-12-12 ASSESSMENT — PAIN SCALES - GENERAL
PAINLEVEL_OUTOF10: 0 - NO PAIN
PAINLEVEL_OUTOF10: 4
PAINLEVEL_OUTOF10: 0 - NO PAIN
PAINLEVEL_OUTOF10: 7

## 2024-12-12 ASSESSMENT — PAIN DESCRIPTION - DESCRIPTORS
DESCRIPTORS: ACHING;NAGGING
DESCRIPTORS: ACHING

## 2024-12-12 ASSESSMENT — PAIN DESCRIPTION - ORIENTATION: ORIENTATION: MID

## 2024-12-12 NOTE — PROGRESS NOTES
Medication Adjustment    The following medication(s) was/were adjusted for per protocol/policy [facility].    Medication(s) adjusted:   Azithromycin IV to PO    -Bianca Hdz, PharmD

## 2024-12-12 NOTE — PROGRESS NOTES
"Pulmonary Daily Progress Note   Subjective    Alanna Hickman is a 44 y.o. year old female patient known with asthma, CVA on plavix, DM, smoker, possible spondyloarthropathy, severe SANGEETA, prescribed PAP therapy but had trouble with machine, admitted on 12/8/2024 with following acute hypoxic respiratory failure due to pneumonia. Pulmonary are consulted for the for respiratory failure and pneumonia.    Interval History:  Now on HFNC this AM    Meds    Scheduled medications  acetylcysteine, 3 mL, nebulization, BID  azithromycin, 500 mg, oral, q24h MARY  benzonatate, 100 mg, oral, TID  clopidogrel, 75 mg, oral, Daily  dapagliflozin propanediol, 10 mg, oral, Daily  enoxaparin, 40 mg, subcutaneous, q24h  insulin glargine, 26 Units, subcutaneous, Nightly  insulin lispro, 0-10 Units, subcutaneous, TID AC  ipratropium-albuteroL, 3 mL, nebulization, 4x daily  nicotine, 1 patch, transdermal, q24h MARY  oxygen, , inhalation, Continuous - Inhalation  oxygen, , inhalation, Continuous - Inhalation  pantoprazole, 20 mg, oral, Daily before breakfast  perflutren protein A microsphere, 0.5 mL, intravenous, Once in imaging  piperacillin-tazobactam, 3.375 g, intravenous, q6h  polyethylene glycol, 17 g, oral, BID  predniSONE, 40 mg, oral, Daily  sulfur hexafluoride microsphr, 2 mL, intravenous, Once in imaging      Continuous medications     PRN medications  PRN medications: acetaminophen **OR** acetaminophen **OR** acetaminophen, benzocaine-menthol, dextromethorphan-guaifenesin, hydrocodone-homatropine, hydrOXYzine HCL, ipratropium-albuteroL, melatonin, ondansetron ODT **OR** ondansetron, oxyCODONE, temazepam, traZODone     Objective    Blood pressure 118/77, pulse 107, temperature 36.7 °C (98.1 °F), temperature source Oral, resp. rate 18, height 1.702 m (5' 7\"), weight 107 kg (235 lb), SpO2 93%.   Physical Exam   GENERAL: Mild tachypnea  HEAD/SINUSES: On nasal cannula 4-5 L  NECK: Large neck  LUNGS: Decreased air entry, some wheezing " and rhonchi  CARDIAC: Regular rate and rhythm  EXTREMITIES: No edema  NEURO: grossly normal mental status,   SKIN: Skin turgor normal.   PSYCH: Anxious    Intake/Output Summary (Last 24 hours) at 12/12/2024 1328  Last data filed at 12/12/2024 1307  Gross per 24 hour   Intake 1530 ml   Output 3000 ml   Net -1470 ml     Labs:   Results from last 72 hours   Lab Units 12/12/24  0534 12/11/24  0507 12/10/24  0517   SODIUM mmol/L 131* 131* 131*   POTASSIUM mmol/L 4.6 3.9 4.3   CHLORIDE mmol/L 92* 95* 99   CO2 mmol/L 32 29 25   BUN mg/dL 12 10 10   CREATININE mg/dL 0.49* 0.46* 0.44*   GLUCOSE mg/dL 216* 173* 166*   CALCIUM mg/dL 9.0 8.9 8.4*   ANION GAP mmol/L 12 11 11   EGFR mL/min/1.73m*2 >90 >90 >90      Results from last 72 hours   Lab Units 12/12/24  0534 12/11/24  0507 12/10/24  0517   WBC AUTO x10*3/uL 9.2 8.9 8.8   HEMOGLOBIN g/dL 14.8 15.8 14.7   HEMATOCRIT % 44.6 48.9* 44.2   PLATELETS AUTO x10*3/uL 258 265 229      Results from last 72 hours   Lab Units 12/12/24  1215 12/10/24  1119   POCT PH, ARTERIAL pH 7.42 7.42   POCT PCO2, ARTERIAL mm Hg 56* 46*   POCT PO2, ARTERIAL mm Hg 67* 64*   POCT SO2, ARTERIAL % 95 93*          Micro/ID:   Lab Results   Component Value Date    URINECULTURE No significant growth 11/20/2023    BLOODCULT No growth at 3 days 12/08/2024    BLOODCULT No growth at 3 days 12/08/2024     Summary of key imaging results from the last 24 hours  CXR better on right     CT chest shows tree-in-bud appearance bilaterally and bronchial Dilation        TTE 2022  There is normal left ventricular systolic function.  Estimated ejection fraction is 60-64%.  No cardiac source of embolus identified.    Mycoplasma IgM positive      TTE 2024   1. Left ventricular ejection fraction is normal, by visual estimate at 60-65%.   2. Spectral Doppler shows a Grade I (impaired relaxation pattern) of left ventricular diastolic filling with normal left atrial filling pressure.   3. There is normal right ventricular  global systolic function.    Impression   Alanna Hickman is a 44 y.o. year old female patient known with asthma, CVA on plavix, DM, smoker, possible spondyloarthropathy, admitted on 12/8/2024 with following acute hypoxic respiratory failure due to pneumonia. Pulmonary are consulted for the for respiratory failure and pneumonia.  Acute hypoxic respiratory failure due to bronchopneumonia most probably infectious in nature, with history of dysphagia this could be also aspiration related however with viral prodrome before presentation favors infectious process viral versus atypical pneumonia versus bacterial.  She is not on chronic steroids however does use steroids intermittently for back pain.  History of asthma which does not seem very well-controlled, no PFTs on chart, on albuterol as needed at home. Recent exposure to parrots and she is worried that is what triggered her sx.   History of CVA and dysphagia  Smoker  History of asthma uncontrolled  SANGEETA uncontrolled, not treated as had trouble with her PAP machine   Mycoplasma pneumonia: IgM positive    Strep, legionella and MRSA normal   Pro calcitonin normal   SLP bedside no dysphagia evidence     Recommendations   As follows:  Recommend continuing antibiotics  Bronchopulmonary hygiene with Incentive spirometry  Monitor for hemoptysis  Prednisone   Based on imaging finding less likely clinically picture related to hypersensitivity pneumonitis or psittacosis and with IgM mycoplasma + will hold off on bronchoscopy. Might consider again if no improvement   Worsening resp status  CT chest and ABG      Horace Hernandez MD   12/12/24 at 1:28 PM     -Only the Medical problems listed under impression were addressed today.   -Please contact primary team for all other concerns and medical problem  -Thank you for your consult       Disclaimer: Documentation completed with the information available at the time of input. Parts of this note may have been scribed or generated  using voice dictation software, Dragon.  Homophonic errors may exist.  Please contact me directly if clarification is needed. The times in the chart may not be reflective of actual patient care times, interventions, or procedures. Documentation occurs after the physical care of the patient.

## 2024-12-12 NOTE — PROGRESS NOTES
Pt anticipated to return home without skilled needs at dc. Please consult if needed.      12/12/24 5510   Discharge Planning   Expected Discharge Disposition Home

## 2024-12-12 NOTE — NURSING NOTE
Assumed care of pt around this time.  Pt is alert, lying in bed, trying to get some sleep.  6L O2 NC noted.  Continuous pulseox monitoring in place.  Telemetry monitoring in place.  Pt is requesting ativan - day shift nurse told the pt that she no longer has ativan ordered because they think that's what was causing her confusion last night. Atarax ordered instead. Day shift nurse administered during BSSR.  She denies any other needs right now.  Call light and belongings within reach.  Will be continuing to monitor.

## 2024-12-12 NOTE — PROGRESS NOTES
Alanna Grant is a 44 y.o. female on day 4 of admission presenting with Bacterial pneumonia.      Subjective   Patient is alert, sitting upright in bed, able to answer questions appropriately. She states that her symptoms are not improving and she is miserable. She reports worsening cough with chest wall, rib, throat, and eye pain. Throat lozenges, honey, and ice are not relieving the discomfort. She denies any hemoptysis, nausea, or vomiting.     She reports dyspnea worse when lying flat and difficulty sleeping. Patient also reports worsened constipation, not having BMs, LLQ and epigastric discomfort. She denies any diarrhea, chest pain, or LE swelling/discomfort.        Objective     Last Recorded Vitals  /67 (BP Location: Left arm, Patient Position: Lying)   Pulse 100   Temp 37 °C (98.6 °F) (Oral)   Resp 18   Wt 107 kg (235 lb)   SpO2 95%   Intake/Output last 3 Shifts:    Intake/Output Summary (Last 24 hours) at 12/12/2024 1022  Last data filed at 12/12/2024 0700  Gross per 24 hour   Intake 1050 ml   Output 2000 ml   Net -950 ml       Admission Weight  Weight: 107 kg (235 lb) (12/08/24 1805)    Daily Weight  12/08/24 : 107 kg (235 lb)    Image Results  Transthoracic Echo (TTE) [place] [address]             Phone [phone]    TRANSTHORACIC ECHOCARDIOGRAM REPORT    Patient Name:       ALANNA GRANT   Reading Physician:    [id number] Maravilla DO  Study Date:         12/11/2024           Ordering Provider:    [id number] ALICE JOYCE  MRN/PID:            [id number]             Fellow:  Accession#:         [id number]         Nurse:  Date of Birth/Age:  1980 / 44      Sonographer:          Brit warren RDCS  Gender Assigned at  F                     Additional Staff:  Birth:  Height:             170.18 cm            Admit Date:  Weight:             106.59 kg            Admission Status:     Inpatient -                                                                 Routine  BSA / BMI:          2.17 m2 / 36.81      Department Location:  [facility]                      kg/m2  Blood Pressure: 138 /85 mmHg    Study Type:    TRANSTHORACIC ECHO (TTE) COMPLETE  Diagnosis/ICD: Acute respiratory failure with hypoxia-J96.01  Indication:    acute hypoxemic respiratory failure, pneumonia  CPT Codes:     Echo Complete w Full [id number]    Patient History:  Pertinent History: HLD, stroke, altered mental status, DM, pneumonia, resp                     failure.    Study Detail: The following Echo studies were performed: 2D, M-Mode, Doppler and                color flow. Technically challenging study due to prominent lung                artifact. Definity used as a contrast agent for endocardial border                definition. Total contrast used for this procedure was 4 mL via IV                push.       PHYSICIAN INTERPRETATION:  Left Ventricle: Left ventricular ejection fraction is normal, by visual estimate at 60-65%. There are no regional wall motion abnormalities. The left ventricular cavity size is normal. There is mild increased septal and normal posterior left ventricular wall thickness. There is left ventricular concentric remodeling. Spectral Doppler shows a Grade I (impaired relaxation pattern) of left ventricular diastolic filling with normal left atrial filling pressure.  Left Atrium: The left atrium is normal in size.  Right Ventricle: The right ventricle is normal in size. There is normal right ventricular global systolic function.  Right Atrium: The right atrium is normal in size.  Aortic Valve: The aortic valve is trileaflet. The aortic valve dimensionless index is 1.00. There is no evidence of aortic valve regurgitation. The peak  instantaneous gradient of the aortic valve is 11 mmHg. The mean gradient of the aortic valve is 7 mmHg.  Mitral Valve: The mitral valve is normal in structure. There is no evidence of mitral valve regurgitation.  Tricuspid Valve: The tricuspid valve is structurally normal. No evidence of tricuspid regurgitation.  Pulmonic Valve: The pulmonic valve is not well visualized. The pulmonic valve regurgitation was not well visualized.  Pericardium: No pericardial effusion noted.  Aorta: The aortic root is normal.       CONCLUSIONS:   1. Left ventricular ejection fraction is normal, by visual estimate at 60-65%.   2. Spectral Doppler shows a Grade I (impaired relaxation pattern) of left ventricular diastolic filling with normal left atrial filling pressure.   3. There is normal right ventricular global systolic function.    QUANTITATIVE DATA SUMMARY:     2D MEASUREMENTS:           Normal Ranges:  LAs:             3.40 cm   (2.7-4.0cm)  IVSd:            1.16 cm   (0.6-1.1cm)  LVPWd:           0.93 cm   (0.6-1.1cm)  LVIDd:           4.20 cm   (3.9-5.9cm)  LVIDs:           2.86 cm  LV Mass Index:   67.2 g/m2  LV % FS          31.8 %       LA VOLUME:                    Normal Ranges:  LA Vol A4C:        52.1 ml    (22+/-6mL/m2)  LA Vol A2C:        57.5 ml  LA Vol BP:         56.6 ml  LA Vol Index A4C:  24.1 ml/m2  LA Vol Index A2C:  26.5 ml/m2  LA Vol Index BP:   26.1 ml/m2  LA Area A4C:       19.5 cm2  LA Area A2C:       19.8 cm2  LA Major Axis A4C: 6.2 cm  LA Major Axis A2C: 5.8 cm  LA Volume Index:   25.8 ml/m2  LA Vol A4C:        52.0 ml  LA Vol A2C:        57.0 ml  LA Vol Index BSA:  25.2 ml/m2       RA VOLUME BY A/L METHOD:            Normal Ranges:  RA Vol A4C:              25.7 ml    (8.3-19.5ml)  RA Vol Index A4C:        11.9 ml/m2  RA Area A4C:             12.3 cm2  RA Major Axis A4C:       5.0 cm       M-MODE MEASUREMENTS:         Normal Ranges:  LAs:                 3.52 cm (2.7-4.0cm)       AORTA MEASUREMENTS:          Normal Ranges:  Ao Sinus, d:        2.70 cm (2.1-3.5cm)  Ao STJ, d:          2.50 cm (1.7-3.4cm)  Asc Ao, d:          2.80 cm (2.1-3.4cm)       LV SYSTOLIC FUNCTION BY 2D PLANIMETRY (MOD):                       Normal Ranges:  EF-A4C View:    65 % (>=55%)  EF-A2C View:    61 %  EF-Biplane:     63 %  EF-Visual:      63 %  LV EF Reported: 63 %       LV DIASTOLIC FUNCTION:           Normal Ranges:  MV Peak E:             0.84 m/s  (0.7-1.2 m/s)  MV Peak A:             1.06 m/s  (0.42-0.7 m/s)  E/A Ratio:             0.79      (1.0-2.2)  MV e'                  0.099 m/s (>8.0)  MV lateral e'          0.12 m/s  MV medial e'           0.08 m/s  E/e' Ratio:            8.51      (<8.0)       MITRAL VALVE:          Normal Ranges:  MV DT:        311 msec (150-240msec)       AORTIC VALVE:                      Normal Ranges:  AoV Vmax:                1.68 m/s  (<=1.7m/s)  AoV Peak P.3 mmHg (<20mmHg)  AoV Mean P.9 mmHg  (1.7-11.5mmHg)  LVOT Max Darvin:            1.59 m/s  (<=1.1m/s)  AoV VTI:                 26.14 cm  (18-25cm)  LVOT VTI:                26.09 cm  LVOT Diameter:           2.06 cm   (1.8-2.4cm)  AoV Area, VTI:           3.33 cm2  (2.5-5.5cm2)  AoV Area,Vmax:           3.15 cm2  (2.5-4.5cm2)  AoV Dimensionless Index: 1.00       RIGHT VENTRICLE:  RV Basal 2.92 cm  RV Mid   1.90 cm  RV Major 6.9 cm  TAPSE:   19.9 mm  RV s'    0.16 m/s       TRICUSPID VALVE/RVSP:          Normal Ranges:  Peak TR Velocity:     2.83 m/s  RV Syst Pressure:     35 mmHg  (< 30mmHg)  IVC Diam:             1.47 cm       AORTA:  Asc Ao Diam 2.83 cm       [id number] Jax Maravilla DO  Electronically signed on 2024 at 4:02:55 PM       ** Final **  XR chest 2 views  Narrative: Interpreted By:  Lauren Canas,   STUDY:  XR CHEST 2 VIEWS 2024 8:55 am      INDICATION:  Signs/Symptoms:evaluate for ongoing hypoxia      COMPARISON:  2024      ACCESSION NUMBER(S):  [id number]      ORDERING  CLINICIAN:  NIKOLAS OLSON      TECHNIQUE:  PA and lateral views of the chest      FINDINGS:  Cardiac silhouette is within normal limits. There is mild generalized  increased interstitial prominence in particular at the lung bases  with streaky appearing basilar infiltrate at the left lung base and  intervally improved at the right lung base. No dense airspace  consolidation. No pleural effusions.                  Impression: 1. Streaky appearing bilateral basilar infiltrate with interval  improvement in right basilar infiltrate with mild interval worsening  of the left basilar infiltrate.      Signed by: Lauren Canas 12/11/2024 9:40 AM  Dictation workstation:   [id number]      Physical Exam  Vitals and nursing note reviewed.   Constitutional:       Appearance: She is ill-appearing and toxic-appearing.   HENT:      Head: Normocephalic and atraumatic.   Eyes:      Extraocular Movements: Extraocular movements intact.   Cardiovascular:      Rate and Rhythm: Regular rhythm. Tachycardia present.      Pulses: Normal pulses.      Heart sounds: Normal heart sounds.   Pulmonary:      Breath sounds: Rhonchi present.   Chest:      Chest wall: Tenderness present.   Musculoskeletal:      Cervical back: Normal range of motion.   Skin:     General: Skin is warm and dry.   Neurological:      General: No focal deficit present.      Mental Status: She is alert.   Psychiatric:         Mood and Affect: Mood normal.         Behavior: Behavior normal.         Judgment: Judgment normal.         Relevant Results  Scheduled medications  acetylcysteine, 3 mL, nebulization, BID  azithromycin, 500 mg, oral, q24h MARY  benzonatate, 100 mg, oral, TID  clopidogrel, 75 mg, oral, Daily  dapagliflozin propanediol, 10 mg, oral, Daily  enoxaparin, 40 mg, subcutaneous, q24h  insulin glargine, 26 Units, subcutaneous, Nightly  insulin lispro, 0-10 Units, subcutaneous, TID AC  ipratropium-albuteroL, 3 mL, nebulization, 4x daily  nicotine, 1 patch,  transdermal, q24h MARY  oxygen, , inhalation, Continuous - Inhalation  pantoprazole, 20 mg, oral, Daily before breakfast  perflutren protein A microsphere, 0.5 mL, intravenous, Once in imaging  piperacillin-tazobactam, 3.375 g, intravenous, q6h  polyethylene glycol, 17 g, oral, BID  predniSONE, 40 mg, oral, Daily  sulfur hexafluoride microsphr, 2 mL, intravenous, Once in imaging      Continuous medications     PRN medications  PRN medications: acetaminophen **OR** acetaminophen **OR** acetaminophen, benzocaine-menthol, dextromethorphan-guaifenesin, hydrocodone-homatropine, hydrOXYzine HCL, ipratropium-albuteroL, melatonin, ondansetron ODT **OR** ondansetron, temazepam    Results for orders placed or performed during the hospital encounter of 12/08/24 (from the past 24 hours)   POCT GLUCOSE   Result Value Ref Range    POCT Glucose 481 (H) 74 - 99 mg/dL   Transthoracic Echo (TTE) Complete   Result Value Ref Range    AV pk bruno 1.68 m/s    LVOT diam 2.06 cm    AV mn grad 7 mmHg    MV E/A ratio 0.79     Tricuspid annular plane systolic excursion 2.0 cm    LV Biplane EF 63 %    LA vol index A/L 26.1 ml/m2    MV avg E/e' ratio 9.35     LV EF 63 %    RV free wall pk S' 15.80 cm/s    RVSP 35.1 mmHg    LVIDd 4.20 cm    AV pk grad 11 mmHg    Aortic Valve Area by Continuity of VTI 3.33 cm2    Aortic Valve Area by Continuity of Peak Velocity 3.15 cm2    LV A4C EF 64.5    POCT GLUCOSE   Result Value Ref Range    POCT Glucose 304 (H) 74 - 99 mg/dL   POCT GLUCOSE   Result Value Ref Range    POCT Glucose 414 (H) 74 - 99 mg/dL   POCT GLUCOSE   Result Value Ref Range    POCT Glucose 353 (H) 74 - 99 mg/dL   Basic Metabolic Panel   Result Value Ref Range    Glucose 216 (H) 74 - 99 mg/dL    Sodium 131 (L) 136 - 145 mmol/L    Potassium 4.6 3.5 - 5.3 mmol/L    Chloride 92 (L) 98 - 107 mmol/L    Bicarbonate 32 21 - 32 mmol/L    Anion Gap 12 10 - 20 mmol/L    Urea Nitrogen 12 6 - 23 mg/dL    Creatinine 0.49 (L) 0.50 - 1.05 mg/dL    eGFR >90  >60 mL/min/1.73m*2    Calcium 9.0 8.6 - 10.3 mg/dL   CBC   Result Value Ref Range    WBC 9.2 4.4 - 11.3 x10*3/uL    nRBC 0.0 0.0 - 0.0 /100 WBCs    RBC 4.64 4.00 - 5.20 x10*6/uL    Hemoglobin 14.8 12.0 - 16.0 g/dL    Hematocrit 44.6 36.0 - 46.0 %    MCV 96 80 - 100 fL    MCH 31.9 26.0 - 34.0 pg    MCHC 33.2 32.0 - 36.0 g/dL    RDW 14.6 (H) 11.5 - 14.5 %    Platelets 258 150 - 450 x10*3/uL   POCT GLUCOSE   Result Value Ref Range    POCT Glucose 209 (H) 74 - 99 mg/dL     Transthoracic Echo (TTE) Complete    Result Date: 12/11/2024           [place] [address]            Phone [phone] TRANSTHORACIC ECHOCARDIOGRAM REPORT Patient Name:       JOCY Barragan Physician:    [id number] Jax Maravilla DO Study Date:         12/11/2024           Ordering Provider:    [id number] ALICE JOYCE MRN/PID:            [id number]             Fellow: Accession#:         [id number]         Nurse: Date of Birth/Age:  1980 / 44      Sonographer:          Brit warren RDCS Gender Assigned at  F                    Additional Staff: Birth: Height:             170.18 cm            Admit Date: Weight:             106.59 kg            Admission Status:     Inpatient -                                                                Routine BSA / BMI:          2.17 m2 / 36.81      Department Location:  [facility]                     kg/m2 Blood Pressure: 138 /85 mmHg Study Type:    TRANSTHORACIC ECHO (TTE) COMPLETE Diagnosis/ICD: Acute respiratory failure with hypoxia-J96.01 Indication:    acute hypoxemic respiratory failure, pneumonia CPT Codes:     Echo Complete w Full Doppler-[id number] Patient History: Pertinent History: HLD, stroke, altered mental status, DM, pneumonia, resp                     failure. Study Detail: The following Echo studies were performed: 2D, M-Mode, Doppler and               color flow. Technically challenging study due to prominent lung               artifact. Definity used as a contrast agent for endocardial border               definition. Total contrast used for this procedure was 4 mL via IV               push.  PHYSICIAN INTERPRETATION: Left Ventricle: Left ventricular ejection fraction is normal, by visual estimate at 60-65%. There are no regional wall motion abnormalities. The left ventricular cavity size is normal. There is mild increased septal and normal posterior left ventricular wall thickness. There is left ventricular concentric remodeling. Spectral Doppler shows a Grade I (impaired relaxation pattern) of left ventricular diastolic filling with normal left atrial filling pressure. Left Atrium: The left atrium is normal in size. Right Ventricle: The right ventricle is normal in size. There is normal right ventricular global systolic function. Right Atrium: The right atrium is normal in size. Aortic Valve: The aortic valve is trileaflet. The aortic valve dimensionless index is 1.00. There is no evidence of aortic valve regurgitation. The peak instantaneous gradient of the aortic valve is 11 mmHg. The mean gradient of the aortic valve is 7 mmHg. Mitral Valve: The mitral valve is normal in structure. There is no evidence of mitral valve regurgitation. Tricuspid Valve: The tricuspid valve is structurally normal. No evidence of tricuspid regurgitation. Pulmonic Valve: The pulmonic valve is not well visualized. The pulmonic valve regurgitation was not well visualized. Pericardium: No pericardial effusion noted. Aorta: The aortic root is normal.  CONCLUSIONS:  1. Left ventricular ejection fraction is normal, by visual estimate at 60-65%.  2. Spectral Doppler shows a Grade I (impaired relaxation pattern) of left ventricular diastolic filling with normal left atrial filling  pressure.  3. There is normal right ventricular global systolic function. QUANTITATIVE DATA SUMMARY:  2D MEASUREMENTS:           Normal Ranges: LAs:             3.40 cm   (2.7-4.0cm) IVSd:            1.16 cm   (0.6-1.1cm) LVPWd:           0.93 cm   (0.6-1.1cm) LVIDd:           4.20 cm   (3.9-5.9cm) LVIDs:           2.86 cm LV Mass Index:   67.2 g/m2 LV % FS          31.8 %  LA VOLUME:                    Normal Ranges: LA Vol A4C:        52.1 ml    (22+/-6mL/m2) LA Vol A2C:        57.5 ml LA Vol BP:         56.6 ml LA Vol Index A4C:  24.1 ml/m2 LA Vol Index A2C:  26.5 ml/m2 LA Vol Index BP:   26.1 ml/m2 LA Area A4C:       19.5 cm2 LA Area A2C:       19.8 cm2 LA Major Axis A4C: 6.2 cm LA Major Axis A2C: 5.8 cm LA Volume Index:   25.8 ml/m2 LA Vol A4C:        52.0 ml LA Vol A2C:        57.0 ml LA Vol Index BSA:  25.2 ml/m2  RA VOLUME BY A/L METHOD:            Normal Ranges: RA Vol A4C:              25.7 ml    (8.3-19.5ml) RA Vol Index A4C:        11.9 ml/m2 RA Area A4C:             12.3 cm2 RA Major Axis A4C:       5.0 cm  M-MODE MEASUREMENTS:         Normal Ranges: LAs:                 3.52 cm (2.7-4.0cm)  AORTA MEASUREMENTS:         Normal Ranges: Ao Sinus, d:        2.70 cm (2.1-3.5cm) Ao STJ, d:          2.50 cm (1.7-3.4cm) Asc Ao, d:          2.80 cm (2.1-3.4cm)  LV SYSTOLIC FUNCTION BY 2D PLANIMETRY (MOD):                      Normal Ranges: EF-A4C View:    65 % (>=55%) EF-A2C View:    61 % EF-Biplane:     63 % EF-Visual:      63 % LV EF Reported: 63 %  LV DIASTOLIC FUNCTION:           Normal Ranges: MV Peak E:             0.84 m/s  (0.7-1.2 m/s) MV Peak A:             1.06 m/s  (0.42-0.7 m/s) E/A Ratio:             0.79      (1.0-2.2) MV e'                  0.099 m/s (>8.0) MV lateral e'          0.12 m/s MV medial e'           0.08 m/s E/e' Ratio:            8.51      (<8.0)  MITRAL VALVE:          Normal Ranges: MV DT:        311 msec (150-240msec)  AORTIC VALVE:                      Normal Ranges: AoV Vmax:                 1.68 m/s  (<=1.7m/s) AoV Peak P.3 mmHg (<20mmHg) AoV Mean P.9 mmHg  (1.7-11.5mmHg) LVOT Max Darvin:            1.59 m/s  (<=1.1m/s) AoV VTI:                 26.14 cm  (18-25cm) LVOT VTI:                26.09 cm LVOT Diameter:           2.06 cm   (1.8-2.4cm) AoV Area, VTI:           3.33 cm2  (2.5-5.5cm2) AoV Area,Vmax:           3.15 cm2  (2.5-4.5cm2) AoV Dimensionless Index: 1.00  RIGHT VENTRICLE: RV Basal 2.92 cm RV Mid   1.90 cm RV Major 6.9 cm TAPSE:   19.9 mm RV s'    0.16 m/s  TRICUSPID VALVE/RVSP:          Normal Ranges: Peak TR Velocity:     2.83 m/s RV Syst Pressure:     35 mmHg  (< 30mmHg) IVC Diam:             1.47 cm  AORTA: Asc Ao Diam 2.83 cm  [id number] Jax Maravilla DO Electronically signed on 2024 at 4:02:55 PM  ** Final **     XR chest 2 views    Result Date: 2024  Interpreted By:  Lauren Canas, STUDY: XR CHEST 2 VIEWS 2024 8:55 am   INDICATION: Signs/Symptoms:evaluate for ongoing hypoxia   COMPARISON: 2024   ACCESSION NUMBER(S): [id number]   ORDERING CLINICIAN: NIKOLAS OLSON   TECHNIQUE: PA and lateral views of the chest   FINDINGS: Cardiac silhouette is within normal limits. There is mild generalized increased interstitial prominence in particular at the lung bases with streaky appearing basilar infiltrate at the left lung base and intervally improved at the right lung base. No dense airspace consolidation. No pleural effusions.             1. Streaky appearing bilateral basilar infiltrate with interval improvement in right basilar infiltrate with mild interval worsening of the left basilar infiltrate.   Signed by: Lauren Canas 2024 9:40 AM Dictation workstation:   [id number]    ECG 12 lead    Result Date: 2024  Poor data quality, interpretation may be adversely affected Normal sinus rhythm Left anterior fascicular block Abnormal ECG No previous ECGs available Confirmed by Anand Lorenzo ([id number]) on 2024  10:16:19 AM    CT angio chest for pulmonary embolism    Result Date: 12/8/2024  Interpreted By:  Kd Johnson, STUDY: CT ANGIO CHEST FOR PULMONARY EMBOLISM;  12/8/2024 6:37 pm   INDICATION: Signs/Symptoms:cough, chest pain, recent pneumonia and on antibiotics. hypoxic in ER. concern for PE or progressive pneumonia.   COMPARISON: 08/05/2022 and recent radiographs   ACCESSION NUMBER(S): [id number]   ORDERING CLINICIAN: DIANE BARBOUR   TECHNIQUE: Helical data acquisition of the chest was obtained after intravenous administration of 75 ML Omnipaque 350 as per PE protocol. Images were reformatted in coronal and sagittal planes. Axial and coronal maximum intensity projection (MIP) images were created and reviewed.   FINDINGS: Bolus timing limits sensitivity . No central pulmonary embolism. Ascending aorta measures 3.1 cm. No pericardial effusion.   Hepatic steatosis. No adrenal abnormality.   Diffuse tree-in-bud type nodular infiltrate seen throughout the lungs compatible with multifocal pneumonia. Subjectively, this appears worse than the most radiograph allowing for differences in technique. No pneumothorax. No measurable pleural effusion.       Prominent hilar and mediastinal lymph nodes presumed reactive. Reference 1.4 cm subcarinal lymph node as well as prominent AP lymph nodes.   Ascending aorta measures 3.1. No suspicious osseous abnormality       1. Allowing for limitations of bolus timing, no evidence of pulmonary embolism. 2. Apparent worsening of diffuse bilateral tree-in-bud type infiltrates compatible with multifocal pneumonia. No pneumothorax or pleural effusion. Adenopathy in the mediastinum presumed reactive   MACRO: None.   Signed by: Kd Johnson 12/8/2024 7:19 PM Dictation workstation:   [id number]    XR chest 2 views    Result Date: 12/7/2024  Interpreted By:  Saji Lira, STUDY: XR CHEST 2 VIEWS   INDICATION: Signs/Symptoms:cough.   COMPARISON: May 28   ACCESSION NUMBER(S): [id number]    ORDERING CLINICIAN: MARISOL GOODWIN   FINDINGS: New right basilar consolidation consistent with pneumonia. No effusion or pneumothorax.       New right basilar pneumonia.   Signed by: Saji Lira 12/7/2024 8:42 AM Dictation workstation:   [id number]    BI mammo bilateral screening tomosynthesis    Result Date: 11/13/2024  Interpreted By:  Neisha Lomas, STUDY: BI MAMMO BILATERAL SCREENING TOMOSYNTHESIS;  11/13/2024 11:46 am   ACCESSION NUMBER(S): [id number]   ORDERING CLINICIAN: TONE MENDOZA   INDICATION: Screening.   COMPARISON: All prior mammograms that are available at the time of interpretation.   FINDINGS: 2D and tomosynthesis images were reviewed at 1 mm slice thickness.   Density:  There are scattered areas of fibroglandular density.   No suspicious masses or calcifications are identified.       No mammographic evidence of malignancy.   BI-RADS CATEGORY: BI-RADS Category:  1 Negative. Recommendation:  Annual Screening. Recommended Date:  1 Year. Laterality:  Bilateral.       For any future breast imaging appointments, please call [phone]).     MACRO: None   Signed by: Neisha Lomas 11/13/2024 3:47 PM Dictation workstation:   [id number]                Assessment/Plan      Impression: This is a 44 year old female on day 4 of admission for acute hypoxic respiratory failure due to bacterial pneumonia.      Bacterial pneumonia. Mycoplasma IgM positive. Diffuse rales remain present on lung auscultation, patient with worsening dyspnea. CT chest 12/11:  Streaky appearing bilateral basilar infiltrate with interval improvement in right basilar infiltrate with mild interval worsening of the left basilar infiltrate.  Continue current antibiotic regimen, oxygen therapy.  Pulmonology consulted.   Acute hypoxemic respiratory failure, likely secondary to mycoplasma pneumonia. Up from 6L to 10L oxygen. pCO2 46, HCO3 29.8, FiO2 5. Continue Duo-Neb, consider Vapotherm. Continue continuous  pulse ox. Respiratory therapy and pulmonology consulted.   Functional constipation. Continuous LLQ and epigastric discomfort. Continue Miralax.  Cough. Progressive throat, chest wall, rib, and eye discomfort. Continue mucomyst, robitussin, benzonatate. Continue to monitor for hemoptysis  Tobacco abuse. Continue nicotine patch.   Type 2 DM. Continue Lantus, sliding scale    DVT and GI prophylaxis with Plavix, PPI            Assessment & Plan  Bacterial pneumonia  Continue current antibiotics.  Acute hypoxemic respiratory failure (Multi)  Secondary to multifocal pneumonia  On 4 L.  Wean oxygen as able.  Will order continuous pulsox  Pulmonology consult- discussed with Dr Scruggs. She recommended echocardiogram which I ordered- normal EF, mild diastolic dysfunction   Low threshold for checking ABG- if any somnolence or confusion RN is to notify me  Tobacco abuse  Discussed cessation, nicotine patch  DM (diabetes mellitus) (Multi)  A1c in 23 was 9.2.  Lantus reordered at higher dose for tonight and moving forward (also increased about 2 days ago)  Sliding scale.  Add on A1c.  Dehydration  Resolved.  Off IV fluids.                  DANIEL LO, OMS-III

## 2024-12-12 NOTE — CARE PLAN
Problem: Respiratory  Goal: Clear secretions with interventions this shift  Outcome: Progressing  Goal: Minimize anxiety/maximize coping throughout shift  Outcome: Progressing  Goal: Minimal/no exertional discomfort or dyspnea this shift  Outcome: Progressing  Goal: No signs of respiratory distress (eg. Use of accessory muscles. Peds grunting)  Outcome: Progressing  Goal: Patent airway maintained this shift  Outcome: Progressing  Goal: Tolerate pulmonary toileting this shift  Outcome: Progressing  Goal: Verbalize decreased shortness of breath this shift  Outcome: Progressing

## 2024-12-12 NOTE — NURSING NOTE
Pt A&O x3 currently resting in bed. No complaints or concerns. Call light within reach.  Pt continues on atb per order. Pt encouraged to sit in chair and use IS.

## 2024-12-12 NOTE — PROGRESS NOTES
Alanna Hickman is a 44 y.o. female on day 4 of admission presenting with Bacterial pneumonia.      Subjective   Patient more awake today she says she feels terrible.  She is tearful.  Says she cannot sleep.  Complains of pain in her chest from frequent coughing.  Says she is very anxious.  Requesting Ativan.    Put on 10 L this morning with pulse ox around 92%.  She refused overnight CPAP.  I ordered Vapotherm for her and saw her after she had been placed on this.  She does not like the feeling.  Her pulse ox is higher though and she looks more comfortable than previous.  Remains tachypneic.         Objective     Last Recorded Vitals  /60 (BP Location: Right arm, Patient Position: Lying)   Pulse 86   Temp 37.6 °C (99.7 °F) (Oral)   Resp 18   Wt 107 kg (235 lb)   SpO2 94%   Intake/Output last 3 Shifts:    Intake/Output Summary (Last 24 hours) at 12/12/2024 1636  Last data filed at 12/12/2024 1307  Gross per 24 hour   Intake 1280 ml   Output 2500 ml   Net -1220 ml       Admission Weight  Weight: 107 kg (235 lb) (12/08/24 1805)    Daily Weight  12/08/24 : 107 kg (235 lb)    Image Results  CT chest wo IV contrast  Narrative: Interpreted By:  Soham Connell,   STUDY:  CT CHEST WO IV CONTRAST; 12/12/2024 2:29 pm      INDICATION:  Signs/Symptoms:pneumonia. Shortness of breath.      COMPARISON:  12/08/2024      ACCESSION NUMBER(S):  [id number]      ORDERING CLINICIAN:  DIANA BRYAN      TECHNIQUE:  The study was performed without intravenous contrast material as  requested. A helical acquisition data was obtained with multiplanar  reconstructions.      One or more of the following dose reduction techniques were used:  Automated exposure control Adjustment of the mA and/or kV according  to patient size, and/or use of iterative reconstruction technique.      FINDINGS:  Within the limits of this unenhanced study no hilar or mediastinal  lymphadenopathy is identified. *Exam is somewhat limited by  motion  artifact. *There is worsening bibasilar tree-in-bud nodular  infiltrate. *There is new large patchy ground-glass infiltrate right  lower lobe 4.3 cm in diameter (Series 8, Image 208) *There are new  patches of ground-glass infiltrate within both upper lobes, most  marked on the right where there are large patches of ground-glass  infiltrate superiorly. There is also new ground-glass infiltrate  within the superior segment left lower lobe. There is also increasing  new right middle lobe tree-in-bud nodular infiltrate. *No effusions  are identified.          Chest Wall: No chest wall masses are identified.      Skeletal System: No fractures or destructive lesions are identified.      Upper Abdomen: There are focal areas of diminished attenuation within  the medial segment left lobe, somewhat elongated tubular in  configuration along with a more focal area medially within the  posterior aspect lateral segment left lobe demonstrating fat  attenuation characteristics. Etiology for these findings is unclear  and they appear stable compared to 12/08/2024. Correlation with LFTs  is recommended. Correlation with contrast-enhanced CT of the abdomen  may also be useful.      Impression: 1. Notable worsening of the infiltrates bilaterally as described  above with worsening tree-in-bud nodular infiltrates along with  multiple new patches of ground-glass infiltrate suggesting infectious  etiology.  2. No alveolar consolidation is noted. No effusions are identified.  3. Stable but somewhat unusual appearance within the liver as  described above for which contrast-enhanced CT is recommended if and  when the patient can tolerate. Correlation with LFTs is also  recommended.      MACRO:  none      Signed by: Soham Connell 12/12/2024 4:26 PM  Dictation workstation:   [id number]      Physical Exam  General: alert, no diaphoresis.  Ill-appearing   Lungs: diminished, diffuse rhonchi.  Tachypneic with conversational dyspnea    Heart: RRR, no LE edema BL   GI: abdomen soft, nontender, nondistended, BS present   MSK: no joint effusion or deformity   Skin: no rashes, erythema, or ecchymosis   Neuro: grossly normal cognition, motor strength, sensation    Relevant Results               Assessment/Plan                  Assessment & Plan  Bacterial pneumonia  Continue current antibiotics.  Acute hypoxemic respiratory failure (Multi)  Respiratory status today.  I increased her to Vapotherm.  Still with tachypnea and respiratory distress on this.  Ordered ABG which showed somewhat higher pCO2 but still compensated.  Her pO2 remains somewhat low on 60% FiO2.  I discussed the case extensively with Dr. Hernandez.  He is getting repeat imaging given patient's worsening status.  He also ordered repeat ABG.  Repeat imaging shows worsening infiltrates.  There is  also an abnormal read of liver-- will check CMP for am  Tobacco abuse  Discussed cessation, nicotine patch  DM (diabetes mellitus) (Multi)  A1c in 23 was 9.2.  Lantus reordered at higher dose for tonight and moving forward (also increased about 2 days ago)  Sliding scale.  Add on A1c.  Dehydration  Resolved.  Off IV fluids.    Patient remains very sick.  Repeat imaging. Dr. Hernandez following closely.  Will adjust pain medications.   CMP for am    CCT: 40 min              Alice Kevin DO

## 2024-12-12 NOTE — CARE PLAN
The patient's goals for the shift include sleep and control cough    The clinical goals for the shift include Manage cough, adequate O2, stable VS

## 2024-12-12 NOTE — NURSING NOTE
Pt sitting on edge of bed. No complaints or concerns. Call light within reach.  Pt continues on atb per order. Pt in droplet isolation per order.

## 2024-12-12 NOTE — ASSESSMENT & PLAN NOTE
Respiratory status today.  I increased her to Vapotherm.  Still with tachypnea and respiratory distress on this.  Ordered ABG which showed somewhat higher pCO2 but still compensated.  Her pO2 remains somewhat low on 60% FiO2.  I discussed the case extensively with Dr. Hernandez.  He is getting repeat imaging given patient's worsening status.  He also ordered repeat ABG.  Repeat imaging shows worsening infiltrates.  There is  also an abnormal read of liver-- will check CMP for am

## 2024-12-13 PROBLEM — R93.2 ABNORMAL LIVER CT: Status: ACTIVE | Noted: 2024-12-13

## 2024-12-13 LAB
ALBUMIN SERPL BCP-MCNC: 3.5 G/DL (ref 3.4–5)
ALP SERPL-CCNC: 70 U/L (ref 33–110)
ALT SERPL W P-5'-P-CCNC: 30 U/L (ref 7–45)
ANION GAP BLDA CALCULATED.4IONS-SCNC: 4 MMO/L (ref 10–25)
ANION GAP SERPL CALCULATED.3IONS-SCNC: 9 MMOL/L (ref 10–20)
APPARATUS: ABNORMAL
ARTERIAL PATENCY WRIST A: POSITIVE
AST SERPL W P-5'-P-CCNC: 15 U/L (ref 9–39)
BACTERIA BLD CULT: NORMAL
BACTERIA BLD CULT: NORMAL
BASE EXCESS BLDA CALC-SCNC: 7.5 MMOL/L (ref -2–3)
BILIRUB SERPL-MCNC: 0.6 MG/DL (ref 0–1.2)
BODY TEMPERATURE: ABNORMAL
BUN SERPL-MCNC: 12 MG/DL (ref 6–23)
CA-I BLDA-SCNC: 1.16 MMOL/L (ref 1.1–1.33)
CALCIUM SERPL-MCNC: 8.9 MG/DL (ref 8.6–10.3)
CHLORIDE BLDA-SCNC: 91 MMOL/L (ref 98–107)
CHLORIDE SERPL-SCNC: 92 MMOL/L (ref 98–107)
CO2 SERPL-SCNC: 34 MMOL/L (ref 21–32)
CREAT SERPL-MCNC: 0.48 MG/DL (ref 0.5–1.05)
EGFRCR SERPLBLD CKD-EPI 2021: >90 ML/MIN/1.73M*2
ERYTHROCYTE [DISTWIDTH] IN BLOOD BY AUTOMATED COUNT: 14.4 % (ref 11.5–14.5)
EST. AVERAGE GLUCOSE BLD GHB EST-MCNC: 177 MG/DL
FLOW: 40 LPM
GLUCOSE BLD MANUAL STRIP-MCNC: 189 MG/DL (ref 74–99)
GLUCOSE BLD MANUAL STRIP-MCNC: 404 MG/DL (ref 74–99)
GLUCOSE BLD MANUAL STRIP-MCNC: 489 MG/DL (ref 74–99)
GLUCOSE BLD MANUAL STRIP-MCNC: 570 MG/DL (ref 74–99)
GLUCOSE BLDA-MCNC: 517 MG/DL (ref 74–99)
GLUCOSE SERPL-MCNC: 282 MG/DL (ref 74–99)
HBA1C MFR BLD: 7.8 %
HCO3 BLDA-SCNC: 33.7 MMOL/L (ref 22–26)
HCT VFR BLD AUTO: 45.2 % (ref 36–46)
HCT VFR BLD EST: 44 % (ref 36–46)
HGB BLD-MCNC: 14.9 G/DL (ref 12–16)
HGB BLDA-MCNC: 14.6 G/DL (ref 12–16)
INHALED O2 CONCENTRATION: 60 %
LACTATE BLDA-SCNC: 1.4 MMOL/L (ref 0.4–2)
MCH RBC QN AUTO: 33.3 PG (ref 26–34)
MCHC RBC AUTO-ENTMCNC: 33 G/DL (ref 32–36)
MCV RBC AUTO: 101 FL (ref 80–100)
NRBC BLD-RTO: 0 /100 WBCS (ref 0–0)
OXYHGB MFR BLDA: 92.5 % (ref 94–98)
PCO2 BLDA: 52 MM HG (ref 38–42)
PH BLDA: 7.42 PH (ref 7.38–7.42)
PLATELET # BLD AUTO: 262 X10*3/UL (ref 150–450)
PO2 BLDA: 67 MM HG (ref 85–95)
POTASSIUM BLDA-SCNC: 5.1 MMOL/L (ref 3.5–5.3)
POTASSIUM SERPL-SCNC: 4.2 MMOL/L (ref 3.5–5.3)
PROT SERPL-MCNC: 7.4 G/DL (ref 6.4–8.2)
RBC # BLD AUTO: 4.48 X10*6/UL (ref 4–5.2)
SAO2 % BLDA: 94 % (ref 94–100)
SODIUM BLDA-SCNC: 124 MMOL/L (ref 136–145)
SODIUM SERPL-SCNC: 131 MMOL/L (ref 136–145)
SPECIMEN DRAWN FROM PATIENT: ABNORMAL
WBC # BLD AUTO: 8.5 X10*3/UL (ref 4.4–11.3)

## 2024-12-13 PROCEDURE — 94640 AIRWAY INHALATION TREATMENT: CPT

## 2024-12-13 PROCEDURE — 2500000001 HC RX 250 WO HCPCS SELF ADMINISTERED DRUGS (ALT 637 FOR MEDICARE OP): Performed by: INTERNAL MEDICINE

## 2024-12-13 PROCEDURE — 36415 COLL VENOUS BLD VENIPUNCTURE: CPT | Performed by: HOSPITALIST

## 2024-12-13 PROCEDURE — 82947 ASSAY GLUCOSE BLOOD QUANT: CPT

## 2024-12-13 PROCEDURE — 2500000002 HC RX 250 W HCPCS SELF ADMINISTERED DRUGS (ALT 637 FOR MEDICARE OP, ALT 636 FOR OP/ED): Performed by: HOSPITALIST

## 2024-12-13 PROCEDURE — 2500000005 HC RX 250 GENERAL PHARMACY W/O HCPCS: Performed by: HOSPITALIST

## 2024-12-13 PROCEDURE — 2500000005 HC RX 250 GENERAL PHARMACY W/O HCPCS: Performed by: INTERNAL MEDICINE

## 2024-12-13 PROCEDURE — 94668 MNPJ CHEST WALL SBSQ: CPT

## 2024-12-13 PROCEDURE — 99233 SBSQ HOSP IP/OBS HIGH 50: CPT | Performed by: HOSPITALIST

## 2024-12-13 PROCEDURE — 97161 PT EVAL LOW COMPLEX 20 MIN: CPT | Mod: GP

## 2024-12-13 PROCEDURE — 2500000004 HC RX 250 GENERAL PHARMACY W/ HCPCS (ALT 636 FOR OP/ED): Performed by: STUDENT IN AN ORGANIZED HEALTH CARE EDUCATION/TRAINING PROGRAM

## 2024-12-13 PROCEDURE — 2500000001 HC RX 250 WO HCPCS SELF ADMINISTERED DRUGS (ALT 637 FOR MEDICARE OP): Performed by: HOSPITALIST

## 2024-12-13 PROCEDURE — 83036 HEMOGLOBIN GLYCOSYLATED A1C: CPT | Mod: WESLAB | Performed by: HOSPITALIST

## 2024-12-13 PROCEDURE — 9420000001 HC RT PATIENT EDUCATION 5 MIN

## 2024-12-13 PROCEDURE — 2500000004 HC RX 250 GENERAL PHARMACY W/ HCPCS (ALT 636 FOR OP/ED): Performed by: INTERNAL MEDICINE

## 2024-12-13 PROCEDURE — 1200000002 HC GENERAL ROOM WITH TELEMETRY DAILY

## 2024-12-13 PROCEDURE — 99232 SBSQ HOSP IP/OBS MODERATE 35: CPT | Performed by: INTERNAL MEDICINE

## 2024-12-13 PROCEDURE — 80053 COMPREHEN METABOLIC PANEL: CPT | Performed by: HOSPITALIST

## 2024-12-13 PROCEDURE — S4991 NICOTINE PATCH NONLEGEND: HCPCS | Performed by: INTERNAL MEDICINE

## 2024-12-13 PROCEDURE — 2500000002 HC RX 250 W HCPCS SELF ADMINISTERED DRUGS (ALT 637 FOR MEDICARE OP, ALT 636 FOR OP/ED): Performed by: STUDENT IN AN ORGANIZED HEALTH CARE EDUCATION/TRAINING PROGRAM

## 2024-12-13 PROCEDURE — 85027 COMPLETE CBC AUTOMATED: CPT | Performed by: HOSPITALIST

## 2024-12-13 PROCEDURE — 2500000001 HC RX 250 WO HCPCS SELF ADMINISTERED DRUGS (ALT 637 FOR MEDICARE OP): Performed by: REGISTERED NURSE

## 2024-12-13 PROCEDURE — 2500000002 HC RX 250 W HCPCS SELF ADMINISTERED DRUGS (ALT 637 FOR MEDICARE OP, ALT 636 FOR OP/ED): Performed by: INTERNAL MEDICINE

## 2024-12-13 PROCEDURE — 97166 OT EVAL MOD COMPLEX 45 MIN: CPT | Mod: GO

## 2024-12-13 RX ORDER — INSULIN LISPRO 100 [IU]/ML
15 INJECTION, SOLUTION INTRAVENOUS; SUBCUTANEOUS ONCE
Status: COMPLETED | OUTPATIENT
Start: 2024-12-13 | End: 2024-12-13

## 2024-12-13 RX ORDER — INSULIN LISPRO 100 [IU]/ML
0-10 INJECTION, SOLUTION INTRAVENOUS; SUBCUTANEOUS
Status: DISCONTINUED | OUTPATIENT
Start: 2024-12-13 | End: 2024-12-17 | Stop reason: HOSPADM

## 2024-12-13 RX ORDER — FUROSEMIDE 10 MG/ML
40 INJECTION INTRAMUSCULAR; INTRAVENOUS ONCE
Status: COMPLETED | OUTPATIENT
Start: 2024-12-13 | End: 2024-12-13

## 2024-12-13 RX ORDER — INSULIN GLARGINE 100 [IU]/ML
30 INJECTION, SOLUTION SUBCUTANEOUS NIGHTLY
Status: DISCONTINUED | OUTPATIENT
Start: 2024-12-13 | End: 2024-12-14

## 2024-12-13 RX ORDER — OXYCODONE HYDROCHLORIDE 5 MG/1
7.5 TABLET ORAL EVERY 6 HOURS PRN
Status: DISCONTINUED | OUTPATIENT
Start: 2024-12-13 | End: 2024-12-17 | Stop reason: HOSPADM

## 2024-12-13 RX ADMIN — GUAIFENESIN SYRUP AND DEXTROMETHORPHAN 5 ML: 100; 10 SYRUP ORAL at 06:08

## 2024-12-13 RX ADMIN — HYDROCODONE BITARTRATE AND HOMATROPINE METHYLBROMIDE 5 ML: 5; 1.5 SOLUTION ORAL at 03:14

## 2024-12-13 RX ADMIN — BENZONATATE 100 MG: 100 CAPSULE ORAL at 09:28

## 2024-12-13 RX ADMIN — INSULIN LISPRO 15 UNITS: 100 INJECTION, SOLUTION INTRAVENOUS; SUBCUTANEOUS at 21:54

## 2024-12-13 RX ADMIN — DAPAGLIFLOZIN 10 MG: 10 TABLET, FILM COATED ORAL at 09:27

## 2024-12-13 RX ADMIN — NICOTINE 1 PATCH: 21 PATCH, EXTENDED RELEASE TRANSDERMAL at 09:27

## 2024-12-13 RX ADMIN — OXYCODONE 7.5 MG: 5 TABLET ORAL at 15:38

## 2024-12-13 RX ADMIN — FUROSEMIDE 40 MG: 10 INJECTION, SOLUTION INTRAMUSCULAR; INTRAVENOUS at 12:23

## 2024-12-13 RX ADMIN — PREDNISONE 40 MG: 20 TABLET ORAL at 09:27

## 2024-12-13 RX ADMIN — PIPERACILLIN SODIUM AND TAZOBACTAM SODIUM 3.38 G: 3; .375 INJECTION, SOLUTION INTRAVENOUS at 23:53

## 2024-12-13 RX ADMIN — INSULIN LISPRO 15 UNITS: 100 INJECTION, SOLUTION INTRAVENOUS; SUBCUTANEOUS at 16:49

## 2024-12-13 RX ADMIN — ACETYLCYSTEINE 200 MG: 200 SOLUTION ORAL; RESPIRATORY (INHALATION) at 19:50

## 2024-12-13 RX ADMIN — IPRATROPIUM BROMIDE AND ALBUTEROL SULFATE 3 ML: 2.5; .5 SOLUTION RESPIRATORY (INHALATION) at 03:34

## 2024-12-13 RX ADMIN — TEMAZEPAM 15 MG: 15 CAPSULE ORAL at 20:26

## 2024-12-13 RX ADMIN — HYDROXYZINE HYDROCHLORIDE 25 MG: 25 TABLET ORAL at 21:55

## 2024-12-13 RX ADMIN — Medication 40 L/MIN: at 19:50

## 2024-12-13 RX ADMIN — PIPERACILLIN SODIUM AND TAZOBACTAM SODIUM 3.38 G: 3; .375 INJECTION, SOLUTION INTRAVENOUS at 17:37

## 2024-12-13 RX ADMIN — BENZONATATE 100 MG: 100 CAPSULE ORAL at 20:25

## 2024-12-13 RX ADMIN — AZITHROMYCIN 500 MG: 500 TABLET, FILM COATED ORAL at 09:27

## 2024-12-13 RX ADMIN — INSULIN LISPRO 10 UNITS: 100 INJECTION, SOLUTION INTRAVENOUS; SUBCUTANEOUS at 12:24

## 2024-12-13 RX ADMIN — IPRATROPIUM BROMIDE AND ALBUTEROL SULFATE 3 ML: 2.5; .5 SOLUTION RESPIRATORY (INHALATION) at 09:05

## 2024-12-13 RX ADMIN — OXYCODONE 7.5 MG: 5 TABLET ORAL at 09:26

## 2024-12-13 RX ADMIN — Medication 40 L/MIN: at 09:07

## 2024-12-13 RX ADMIN — HYDROXYZINE HYDROCHLORIDE 25 MG: 25 TABLET ORAL at 03:14

## 2024-12-13 RX ADMIN — PIPERACILLIN SODIUM AND TAZOBACTAM SODIUM 3.38 G: 3; .375 INJECTION, SOLUTION INTRAVENOUS at 00:13

## 2024-12-13 RX ADMIN — INSULIN LISPRO 2 UNITS: 100 INJECTION, SOLUTION INTRAVENOUS; SUBCUTANEOUS at 09:27

## 2024-12-13 RX ADMIN — PANTOPRAZOLE SODIUM 20 MG: 20 TABLET, DELAYED RELEASE ORAL at 06:07

## 2024-12-13 RX ADMIN — HYDROXYZINE HYDROCHLORIDE 25 MG: 25 TABLET ORAL at 15:37

## 2024-12-13 RX ADMIN — INSULIN GLARGINE 30 UNITS: 100 INJECTION, SOLUTION SUBCUTANEOUS at 21:54

## 2024-12-13 RX ADMIN — HYDROCODONE BITARTRATE AND HOMATROPINE METHYLBROMIDE 5 ML: 5; 1.5 SOLUTION ORAL at 15:38

## 2024-12-13 RX ADMIN — BENZONATATE 100 MG: 100 CAPSULE ORAL at 15:38

## 2024-12-13 RX ADMIN — PIPERACILLIN SODIUM AND TAZOBACTAM SODIUM 3.38 G: 3; .375 INJECTION, SOLUTION INTRAVENOUS at 12:23

## 2024-12-13 RX ADMIN — HYDROCODONE BITARTRATE AND HOMATROPINE METHYLBROMIDE 5 ML: 5; 1.5 SOLUTION ORAL at 21:55

## 2024-12-13 RX ADMIN — ENOXAPARIN SODIUM 40 MG: 40 INJECTION SUBCUTANEOUS at 20:25

## 2024-12-13 RX ADMIN — OXYCODONE 7.5 MG: 5 TABLET ORAL at 22:05

## 2024-12-13 RX ADMIN — CLOPIDOGREL BISULFATE 75 MG: 75 TABLET ORAL at 09:27

## 2024-12-13 RX ADMIN — ACETYLCYSTEINE 600 MG: 200 SOLUTION ORAL; RESPIRATORY (INHALATION) at 09:04

## 2024-12-13 RX ADMIN — Medication 40 L/MIN: at 09:05

## 2024-12-13 RX ADMIN — HYDROCODONE BITARTRATE AND HOMATROPINE METHYLBROMIDE 5 ML: 5; 1.5 SOLUTION ORAL at 09:30

## 2024-12-13 RX ADMIN — PIPERACILLIN SODIUM AND TAZOBACTAM SODIUM 3.38 G: 3; .375 INJECTION, SOLUTION INTRAVENOUS at 06:08

## 2024-12-13 RX ADMIN — HYDROXYZINE HYDROCHLORIDE 25 MG: 25 TABLET ORAL at 09:26

## 2024-12-13 RX ADMIN — IPRATROPIUM BROMIDE AND ALBUTEROL SULFATE 3 ML: 2.5; .5 SOLUTION RESPIRATORY (INHALATION) at 12:16

## 2024-12-13 RX ADMIN — GUAIFENESIN SYRUP AND DEXTROMETHORPHAN 5 ML: 100; 10 SYRUP ORAL at 17:37

## 2024-12-13 RX ADMIN — OXYCODONE 5 MG: 5 TABLET ORAL at 03:14

## 2024-12-13 RX ADMIN — IPRATROPIUM BROMIDE AND ALBUTEROL SULFATE 3 ML: 2.5; .5 SOLUTION RESPIRATORY (INHALATION) at 19:50

## 2024-12-13 ASSESSMENT — COGNITIVE AND FUNCTIONAL STATUS - GENERAL
WALKING IN HOSPITAL ROOM: A LITTLE
STANDING UP FROM CHAIR USING ARMS: A LITTLE
DAILY ACTIVITIY SCORE: 16
WALKING IN HOSPITAL ROOM: A LITTLE
DRESSING REGULAR LOWER BODY CLOTHING: A LOT
MOVING FROM LYING ON BACK TO SITTING ON SIDE OF FLAT BED WITH BEDRAILS: A LITTLE
PERSONAL GROOMING: A LITTLE
TURNING FROM BACK TO SIDE WHILE IN FLAT BAD: A LITTLE
CLIMB 3 TO 5 STEPS WITH RAILING: A LOT
DAILY ACTIVITIY SCORE: 24
DRESSING REGULAR UPPER BODY CLOTHING: A LITTLE
WALKING IN HOSPITAL ROOM: A LITTLE
CLIMB 3 TO 5 STEPS WITH RAILING: A LITTLE
DAILY ACTIVITIY SCORE: 24
CLIMB 3 TO 5 STEPS WITH RAILING: A LOT
TOILETING: A LOT
MOBILITY SCORE: 21
MOVING TO AND FROM BED TO CHAIR: A LITTLE
MOBILITY SCORE: 22
HELP NEEDED FOR BATHING: A LOT
MOBILITY SCORE: 17

## 2024-12-13 ASSESSMENT — PAIN - FUNCTIONAL ASSESSMENT
PAIN_FUNCTIONAL_ASSESSMENT: 0-10
PAIN_FUNCTIONAL_ASSESSMENT: 0-10
PAIN_FUNCTIONAL_ASSESSMENT: FLACC (FACE, LEGS, ACTIVITY, CRY, CONSOLABILITY)
PAIN_FUNCTIONAL_ASSESSMENT: 0-10
PAIN_FUNCTIONAL_ASSESSMENT: FLACC (FACE, LEGS, ACTIVITY, CRY, CONSOLABILITY)
PAIN_FUNCTIONAL_ASSESSMENT: 0-10
PAIN_FUNCTIONAL_ASSESSMENT: FLACC (FACE, LEGS, ACTIVITY, CRY, CONSOLABILITY)
PAIN_FUNCTIONAL_ASSESSMENT: FLACC (FACE, LEGS, ACTIVITY, CRY, CONSOLABILITY)
PAIN_FUNCTIONAL_ASSESSMENT: 0-10
PAIN_FUNCTIONAL_ASSESSMENT: FLACC (FACE, LEGS, ACTIVITY, CRY, CONSOLABILITY)
PAIN_FUNCTIONAL_ASSESSMENT: 0-10
PAIN_FUNCTIONAL_ASSESSMENT: FLACC (FACE, LEGS, ACTIVITY, CRY, CONSOLABILITY)
PAIN_FUNCTIONAL_ASSESSMENT: 0-10

## 2024-12-13 ASSESSMENT — PAIN SCALES - GENERAL
PAINLEVEL_OUTOF10: 0 - NO PAIN
PAINLEVEL_OUTOF10: 6
PAINLEVEL_OUTOF10: 0 - NO PAIN
PAINLEVEL_OUTOF10: 7
PAINLEVEL_OUTOF10: 6
PAINLEVEL_OUTOF10: 3
PAINLEVEL_OUTOF10: 0 - NO PAIN
PAINLEVEL_OUTOF10: 8
PAINLEVEL_OUTOF10: 0 - NO PAIN
PAINLEVEL_OUTOF10: 8
PAINLEVEL_OUTOF10: 0 - NO PAIN

## 2024-12-13 ASSESSMENT — ACTIVITIES OF DAILY LIVING (ADL)
BATHING_ASSISTANCE: MODERATE
ADL_ASSISTANCE: INDEPENDENT
ADL_ASSISTANCE: INDEPENDENT

## 2024-12-13 ASSESSMENT — PAIN DESCRIPTION - DESCRIPTORS
DESCRIPTORS: SHARP
DESCRIPTORS: SHARP;ACHING
DESCRIPTORS: ACHING
DESCRIPTORS: ACHING;SHARP

## 2024-12-13 NOTE — NURSING NOTE
Took over care of pt at this time. Pt sitting at edge of bed, alert, aox3-4. Pt c/o rib pain from coughing. Pt states she's asked for cough syrup, something for anxiety, and a sleep aid stronger than melatonin. I inform her I will review her MAR when I finish report. Pt understands and is agreeable to plan. Pt has no other needs or complaints at this time. Pt oriented to call bell and it and belongings are placed in reach. Telemetry leads connected and reading on monitor. HF NC O2 in place with no audible leaks or alarms. Bed alarm refused. Continuing to monitor.

## 2024-12-13 NOTE — NURSING NOTE
Pt resting in bed. No complaints or concerns. Call light within reach.  Pt continues on atb per order.  Pt in droplet isolation per order.

## 2024-12-13 NOTE — NURSING NOTE
Pt A&O x3 currently resting in bed. No complaints or concerns. Call light within reach. Pt continues on atb per order.  Pt continues on high flow oxygen per order. Pt in droplet isolation per order.

## 2024-12-13 NOTE — PROGRESS NOTES
"Pulmonary Daily Progress Note   Subjective    Alanna Hickman is a 44 y.o. year old female patient known with asthma, CVA on plavix, DM, smoker, possible spondyloarthropathy, severe SANGEETA, prescribed PAP therapy but had trouble with machine, admitted on 12/8/2024 with following acute hypoxic respiratory failure due to pneumonia. Pulmonary are consulted for the for respiratory failure and pneumonia.    Interval History:  Remains on high flow  States she is very tired this AM    Meds    Scheduled medications  acetylcysteine, 3 mL, nebulization, BID  azithromycin, 500 mg, oral, q24h MARY  benzonatate, 100 mg, oral, TID  clopidogrel, 75 mg, oral, Daily  dapagliflozin propanediol, 10 mg, oral, Daily  enoxaparin, 40 mg, subcutaneous, q24h  insulin glargine, 26 Units, subcutaneous, Nightly  insulin lispro, 0-10 Units, subcutaneous, TID AC  ipratropium-albuteroL, 3 mL, nebulization, 4x daily  nicotine, 1 patch, transdermal, q24h [facility]  oxygen, , inhalation, Continuous - Inhalation  oxygen, , inhalation, Continuous - Inhalation  pantoprazole, 20 mg, oral, Daily before breakfast  perflutren protein A microsphere, 0.5 mL, intravenous, Once in imaging  piperacillin-tazobactam, 3.375 g, intravenous, q6h  polyethylene glycol, 17 g, oral, BID  predniSONE, 40 mg, oral, Daily  sulfur hexafluoride microsphr, 2 mL, intravenous, Once in imaging      Continuous medications     PRN medications  PRN medications: acetaminophen **OR** acetaminophen **OR** acetaminophen, benzocaine-menthol, dextromethorphan-guaifenesin, hydrocodone-homatropine, hydrOXYzine HCL, ipratropium-albuteroL, melatonin, ondansetron ODT **OR** ondansetron, oxyCODONE, temazepam     Objective    Blood pressure 114/82, pulse 102, temperature 36.7 °C (98.1 °F), temperature source Oral, resp. rate 18, height 1.702 m (5' 7\"), weight 107 kg (235 lb), SpO2 94%.   Physical Exam   GENERAL: Mild tachypnea  HEAD/SINUSES: On nasal cannula 4-5 L  NECK: Large neck  LUNGS: Decreased " air entry, some wheezing and rhonchi  CARDIAC: Regular rate and rhythm  EXTREMITIES: No edema  NEURO: grossly normal mental status,   SKIN: Skin turgor normal.   PSYCH: Anxious    Intake/Output Summary (Last 24 hours) at 12/13/2024 1316  Last data filed at 12/13/2024 1116  Gross per 24 hour   Intake 820 ml   Output 200 ml   Net 620 ml     Labs:   Results from last 72 hours   Lab Units 12/13/24  0534 12/12/24  0534 12/11/24  0507   SODIUM mmol/L 131* 131* 131*   POTASSIUM mmol/L 4.2 4.6 3.9   CHLORIDE mmol/L 92* 92* 95*   CO2 mmol/L 34* 32 29   BUN mg/dL 12 12 10   CREATININE mg/dL 0.48* 0.49* 0.46*   GLUCOSE mg/dL 282* 216* 173*   CALCIUM mg/dL 8.9 9.0 8.9   ANION GAP mmol/L 9* 12 11   EGFR mL/min/1.73m*2 >90 >90 >90      Results from last 72 hours   Lab Units 12/13/24  0534 12/12/24  0534 12/11/24  0507   WBC AUTO x10*3/uL 8.5 9.2 8.9   HEMOGLOBIN g/dL 14.9 14.8 15.8   HEMATOCRIT % 45.2 44.6 48.9*   PLATELETS AUTO x10*3/uL 262 258 265      Results from last 72 hours   Lab Units 12/12/24  1503 12/12/24  1215   POCT PH, ARTERIAL pH 7.42 7.42   POCT PCO2, ARTERIAL mm Hg 52* 56*   POCT PO2, ARTERIAL mm Hg 67* 67*   POCT SO2, ARTERIAL % 94 95          Micro/ID:   Lab Results   Component Value Date    URINECULTURE No significant growth 11/20/2023    BLOODCULT No growth at 4 days -  FINAL REPORT 12/08/2024    BLOODCULT No growth at 4 days -  FINAL REPORT 12/08/2024     Summary of key imaging results from the last 24 hours  CXR better on right     CT chest shows tree-in-bud appearance bilaterally and bronchial Dilation   TTE 2022  There is normal left ventricular systolic function.  Estimated ejection fraction is 60-64%.  No cardiac source of embolus identified.    Mycoplasma IgM positive      TTE 2024   1. Left ventricular ejection fraction is normal, by visual estimate at 60-65%.   2. Spectral Doppler shows a Grade I (impaired relaxation pattern) of left ventricular diastolic filling with normal left atrial filling  pressure.   3. There is normal right ventricular global systolic function.    Impression   Alanna Hickman is a 44 y.o. year old female patient known with asthma, CVA on plavix, DM, smoker, possible spondyloarthropathy, admitted on 12/8/2024 with following acute hypoxic respiratory failure due to pneumonia. Pulmonary are consulted for the for respiratory failure and pneumonia.  Acute hypoxic respiratory failure due to bronchopneumonia most probably infectious in nature, with history of dysphagia this could be also aspiration related however with viral prodrome before presentation favors infectious process viral versus atypical pneumonia versus bacterial.  She is not on chronic steroids however does use steroids intermittently for back pain.  History of asthma which does not seem very well-controlled, no PFTs on chart, on albuterol as needed at home. Recent exposure to parrots and she is worried that is what triggered her sx.   History of CVA and dysphagia  Smoker  History of asthma uncontrolled  SANGEETA uncontrolled, not treated as had trouble with her PAP machine   Mycoplasma pneumonia: IgM positive    Strep, legionella and MRSA normal   Pro calcitonin normal   SLP bedside no dysphagia evidence     Recommendations   As follows:  Recommend continuing antibiotics  Bronchopulmonary hygiene with Incentive spirometry  Monitor for hemoptysis  Prednisone   Based on imaging finding less likely clinically picture related to hypersensitivity pneumonitis or psittacosis and with IgM mycoplasma    Stable overnight but worsening status overall.   CT chest reviewed  Slightly worse findings  Trial of lasix  Will monitor over the weekend  May need bronch for biopsy or culture      Horace Hernandez MD   12/13/24 at 1:16 PM     -Only the Medical problems listed under impression were addressed today.   -Please contact primary team for all other concerns and medical problem  -Thank you for your consult       Disclaimer: Documentation  completed with the information available at the time of input. Parts of this note may have been scribed or generated using voice dictation software, Dragon.  Homophonic errors may exist.  Please contact me directly if clarification is needed. The times in the chart may not be reflective of actual patient care times, interventions, or procedures. Documentation occurs after the physical care of the patient.

## 2024-12-13 NOTE — NURSING NOTE
Rounded on pt. Pt laying in bed, alert, aox4. Pt denies pain and has no needs or complaints at this time. PCA at bedside and empties BSC and brings pt a popsicle. Pt oriented to call bell and it and belongings are placed in reach. Telemetry leads connected and reading on monitor. HF NC O2 in place with no audible leaks or alarms. Bed alarm refused. Continuing to monitor.

## 2024-12-13 NOTE — CARE PLAN
Problem: Pain - Adult  Goal: Verbalizes/displays adequate comfort level or baseline comfort level  Outcome: Progressing     Problem: Safety - Adult  Goal: Free from fall injury  Outcome: Progressing     Problem: Discharge Planning  Goal: Discharge to home or other facility with appropriate resources  Outcome: Progressing     Problem: Chronic Conditions and Co-morbidities  Goal: Patient's chronic conditions and co-morbidity symptoms are monitored and maintained or improved  Outcome: Progressing     Problem: Respiratory  Goal: Clear secretions with interventions this shift  Outcome: Progressing  Goal: Minimize anxiety/maximize coping throughout shift  Outcome: Progressing  Goal: Minimal/no exertional discomfort or dyspnea this shift  Outcome: Progressing  Goal: No signs of respiratory distress (eg. Use of accessory muscles. Peds grunting)  Outcome: Progressing  Goal: Patent airway maintained this shift  Outcome: Progressing  Goal: Tolerate pulmonary toileting this shift  Outcome: Progressing  Goal: Verbalize decreased shortness of breath this shift  Outcome: Progressing  Goal: Wean oxygen to maintain O2 saturation per order/standard this shift  Outcome: Progressing  Goal: Increase self care and/or family involvement in next 24 hours  Outcome: Progressing     Problem: Fall/Injury  Goal: Not fall by end of shift  Outcome: Progressing  Goal: Be free from injury by end of the shift  Outcome: Progressing  Goal: Verbalize understanding of personal risk factors for fall in the hospital  Outcome: Progressing  Goal: Verbalize understanding of risk factor reduction measures to prevent injury from fall in the home  Outcome: Progressing  Goal: Use assistive devices by end of the shift  Outcome: Progressing  Goal: Pace activities to prevent fatigue by end of the shift  Outcome: Progressing     Problem: Diabetes  Goal: Achieve decreasing blood glucose levels by end of shift  Outcome: Progressing  Goal: Increase stability of blood  glucose readings by end of shift  Outcome: Progressing  Goal: Decrease in ketones present in urine by end of shift  Outcome: Progressing  Goal: Maintain electrolyte levels within acceptable range throughout shift  Outcome: Progressing  Goal: Maintain glucose levels >70mg/dl to <250mg/dl throughout shift  Outcome: Progressing  Goal: No changes in neurological exam by end of shift  Outcome: Progressing  Goal: Learn about and adhere to nutrition recommendations by end of shift  Outcome: Progressing  Goal: Vital signs within normal range for age by end of shift  Outcome: Progressing  Goal: Increase self care and/or family involovement by end of shift  Outcome: Progressing  Goal: Receive DSME education by end of shift  Outcome: Progressing   The patient's goals for the shift include sleep and control cough    The clinical goals for the shift include maintain pulse ox 92% or greater, manage cough/pain    Over the shift, the patient did make progress toward the goals.

## 2024-12-13 NOTE — PROGRESS NOTES
Physical Therapy    Physical Therapy Evaluation    Patient Name: Alanna Hickman  MRN: [id number]  Department: [address]  Room: [address] [place] [address]  Today's Date: 12/13/2024   Time Calculation  Start Time: 1207  Stop Time: 1224  Time Calculation (min): 17 min    Assessment/Plan   PT Assessment  PT Assessment Results: Decreased strength, Decreased endurance, Impaired balance, Decreased mobility, Decreased safety awareness, Impaired sensation, Pain  Rehab Prognosis: Good  Evaluation/Treatment Tolerance: Patient limited by fatigue (stating she hadn't slept last night)  Medical Staff Made Aware: Yes (discussed with RNSaranya)  Strengths: Support of extended family/friends, Premorbid level of function  Barriers to Participation: Ability to acquire knowledge, Attitude of self, Coping skills, Comorbidities  End of Session Communication: Bedside nurse  Assessment Comment: Pt. would benefit from continued physical therapy at mod intensity level.  End of Session Patient Position: Bed, 3 rail up, Alarm off, not on at start of session (RT providing breathing treatment bedside)  IP OR SWING BED PT PLAN  Inpatient or Swing Bed: Inpatient  PT Plan  Treatment/Interventions: Bed mobility, Transfer training, Gait training, Balance training, Strengthening, Endurance training, Therapeutic activity  PT Plan: Ongoing PT  PT Frequency: 4 times per week  PT Discharge Recommendations: Moderate intensity level of continued care  Equipment Recommended upon Discharge:  (rollator)    Subjective   General Visit Information:  General  Reason for Referral: impaired functional mobility; PT eval and treat  Referred By: Dorita  Past Medical History Relevant to Rehab: asthma, CVA on plavix, DM, smoker, possible spondyloarthropathy, severe SANGEETA,  Prior to Session Communication: Bedside nurse (Saranya)  Patient Position Received: Bed, 3 rail up, Alarm off, not on at start of session (was getting off BSC heading back to bed, no ad)  General Comment: Pt. is 44  "y/o female adm. due to c/o sob, progressive cough with greenish sputum.  In ER, found to be hypoxemic.   DX: Bacterial pneumonia; Hypoxic respiratory failure  Home Living:  Home Living  Type of Home: House  Lives With: Adult children, Dependent children  Home Adaptive Equipment:  (rollator)  Home Layout: Multi-level, Able to live on main level with bedroom/bathroom  Home Access: Stairs to enter with rails  Entrance Stairs-Number of Steps: 3  Prior Level of Function:  Prior Function Per Pt/Caregiver Report  Level of [place]: Independent with ADLs and functional transfers, Needs assistance with homemaking  Receives Help From: Family  ADL Assistance: Independent  Homemaking Assistance: Needs assistance  Ambulatory Assistance: Independent (with rollator)  Vocational: On disability  Precautions:  Precautions  Medical Precautions: Fall precautions, Infection precautions, Oxygen therapy device and L/min (Droplet)  Precautions Comment: 40L, 60% High flow  Objective   Pain:  Pain Assessment  Pain Assessment: 0-10  0-10 (Numeric) Pain Score: 8  Pain Type: Chronic pain  Pain Location: Chest (and ribs \"from coughing so much\")  Cognition:  Cognition  Cognition Comments:  (Pt. is anxious, easily agitated with questioning.  Follows simple commands, reluctantly.  Needs encouragement to participate.)    General Assessments:     Activity Tolerance  Endurance: Decreased tolerance for upright activites    Sensation  Light Touch: No apparent deficits    Strength  Strength Comments: grossly diamond ues 3+/5, grossly diamond hips 3+/5, knees 4-/5 ankles 3+/5  Static Sitting Balance  Static Sitting-Balance Support: No upper extremity supported  Static Sitting-Level of Assistance: Close supervision  Static Sitting-Comment/Number of Minutes: 6  Dynamic Sitting Balance  Dynamic Sitting-Balance Support: No upper extremity supported  Dynamic Sitting-Level of Assistance: Contact guard  Dynamic Sitting-Comments: F+    Static Standing Balance  Static " Standing-Balance Support: No upper extremity supported  Static Standing-Level of Assistance: Contact guard  Static Standing-Comment/Number of Minutes: 2  Dynamic Standing Balance  Dynamic Standing-Balance Support: No upper extremity supported  Dynamic Standing-Level of Assistance: Contact guard, Minimum assistance  Dynamic Standing-Comments: F-  Functional Assessments:  Bed Mobility  Bed Mobility:  (sit>supine supervision)    Transfers  Transfer:  (sit<>stand from eob req CGA)    Ambulation/Gait Training  Ambulation/Gait Training Performed:  (Pt. ambulated few steps from bsc to bed with cga, pt. reaching out for external support and unsteady.  Then stood again from eob and took few sidesteps with cga.  Unsteady, at risk for falls; wide giovanna; decr. trunk control for erect posture.)  Extremity/Trunk Assessments:  RUE   RUE : Within Functional Limits  LUE   LUE: Within Functional Limits  RLE   RLE : Within Functional Limits  LLE   LLE : Within Functional Limits  Outcome Measures:  [facility] Basic Mobility  Turning from your back to your side while in a flat bed without using bedrails: A little  Moving from lying on your back to sitting on the side of a flat bed without using bedrails: A little  Moving to and from bed to chair (including a wheelchair): A little  Standing up from a chair using your arms (e.g. wheelchair or bedside chair): A little  To walk in hospital room: A little  Climbing 3-5 steps with railing: A lot  Basic Mobility - Total Score: 17    Encounter Problems       Encounter Problems (Active)       PT Problem       STG - Pt will transition supine <> sitting independently (Progressing)       Start:  12/13/24    Expected End:  12/27/24            STG - Pt will transfer sit<>stand independently (Progressing)       Start:  12/13/24    Expected End:  12/27/24            STG - Pt will amb 150' using ww/rollator or short distances w/o ad with independently, O2 and rest breaks prn  (Progressing)       Start:   12/13/24    Expected End:  12/27/24            Pt.will perform BLE therapeutic exercises x 20reps x 2 sets for increased functional strength/endurance  (Progressing)       Start:  12/13/24    Expected End:  12/27/24            Pt. will demonstrate >=G dyn stand balance for decr. risk for falls (Progressing)       Start:  12/13/24    Expected End:  12/27/24               Pain - Adult              Education Documentation  Mobility Training, taught by Laila Montanez, PT at 12/13/2024  1:53 PM.  Learner: Patient  Readiness: Acceptance  Method: Explanation  Response: Needs Reinforcement    Education Comments  No comments found.

## 2024-12-13 NOTE — ASSESSMENT & PLAN NOTE
A1c in 23 was 9.2.  Continue current dose of lantus. Still with poor glucose control. Will increase lantus again tomorrow if Sliding scale increase does not help   Will increase sliding scale to option 2  Check a1c

## 2024-12-13 NOTE — PROGRESS NOTES
"Alanna Hickman is a 44 y.o. female on day 5 of admission presenting with Bacterial pneumonia.      Subjective   Says she feels terrible. Says her pain in her chest remains severe. Slept some overnight but not great. Woke up at one point unable to breathe- her NC had fallen out. RN got her situated again. Still asking for ativan. Says the pain medication \"takes the edge off\". Has no been getting out of bed except to go to bedside commode.          Objective     Last Recorded Vitals  /88 (BP Location: Left arm, Patient Position: Sitting)   Pulse 86   Temp 37.3 °C (99.1 °F) (Oral)   Resp 18   Wt 107 kg (235 lb)   SpO2 96%   Intake/Output last 3 Shifts:    Intake/Output Summary (Last 24 hours) at 12/13/2024 0802  Last data filed at 12/13/2024 0043  Gross per 24 hour   Intake 870 ml   Output 1400 ml   Net -530 ml       Admission Weight  Weight: 107 kg (235 lb) (12/08/24 1805)    Daily Weight  12/08/24 : 107 kg (235 lb)    Image Results  CT chest wo IV contrast  Narrative: Interpreted By:  Soham Connell,   STUDY:  CT CHEST WO IV CONTRAST; 12/12/2024 2:29 pm      INDICATION:  Signs/Symptoms:pneumonia. Shortness of breath.      COMPARISON:  12/08/2024      ACCESSION NUMBER(S):  [id number]      ORDERING CLINICIAN:  DIANA BRYAN      TECHNIQUE:  The study was performed without intravenous contrast material as  requested. A helical acquisition data was obtained with multiplanar  reconstructions.      One or more of the following dose reduction techniques were used:  Automated exposure control Adjustment of the mA and/or kV according  to patient size, and/or use of iterative reconstruction technique.      FINDINGS:  Within the limits of this unenhanced study no hilar or mediastinal  lymphadenopathy is identified. *Exam is somewhat limited by motion  artifact. *There is worsening bibasilar tree-in-bud nodular  infiltrate. *There is new large patchy ground-glass infiltrate right  lower lobe 4.3 cm in diameter " (Series 8, Image 208) *There are new  patches of ground-glass infiltrate within both upper lobes, most  marked on the right where there are large patches of ground-glass  infiltrate superiorly. There is also new ground-glass infiltrate  within the superior segment left lower lobe. There is also increasing  new right middle lobe tree-in-bud nodular infiltrate. *No effusions  are identified.          Chest Wall: No chest wall masses are identified.      Skeletal System: No fractures or destructive lesions are identified.      Upper Abdomen: There are focal areas of diminished attenuation within  the medial segment left lobe, somewhat elongated tubular in  configuration along with a more focal area medially within the  posterior aspect lateral segment left lobe demonstrating fat  attenuation characteristics. Etiology for these findings is unclear  and they appear stable compared to 12/08/2024. Correlation with LFTs  is recommended. Correlation with contrast-enhanced CT of the abdomen  may also be useful.      Impression: 1. Notable worsening of the infiltrates bilaterally as described  above with worsening tree-in-bud nodular infiltrates along with  multiple new patches of ground-glass infiltrate suggesting infectious  etiology.  2. No alveolar consolidation is noted. No effusions are identified.  3. Stable but somewhat unusual appearance within the liver as  described above for which contrast-enhanced CT is recommended if and  when the patient can tolerate. Correlation with LFTs is also  recommended.      MACRO:  none      Signed by: Soham Connell 12/12/2024 4:26 PM  Dictation workstation:   [id number]      Physical Exam  General: alert, no diaphoresis.  Ill-appearing   Lungs: diminished, diffuse rhonchi.  Tachypneic with conversational dyspnea   Heart: RRR, no LE edema BL   GI: abdomen soft, nontender, nondistended, BS present   MSK: no joint effusion or deformity   Skin: no rashes, erythema, or ecchymosis   Neuro:  grossly normal cognition, motor strength, sensation    Relevant Results               Assessment/Plan                  Assessment & Plan  Bacterial pneumonia  Continue current antibiotics- azithro and zosyn.  Acute hypoxemic respiratory failure (Multi)  Respiratory status today.  I increased her to Vapotherm.  Still with tachypnea and respiratory distress on this.  Ordered ABG which showed somewhat higher pCO2 but still compensated.  Her pO2 remains somewhat low on 60% FiO2.  I discussed the case extensively with Dr. Hernandez.  Repeat ABG was similar.  Repeat imaging shows worsening infiltrates.  There is  also an abnormal read of liver-- liver function normal  Tobacco abuse  Discussed cessation, nicotine patch  DM (diabetes mellitus) (Multi)  A1c in 23 was 9.2.  Continue current dose of lantus. Still with poor glucose control. Will increase lantus again tomorrow if Sliding scale increase does not help   Will increase sliding scale to option 2  Check a1c  Dehydration  Resolved.  Off IV fluids.    Abnormal liver CT  Liver function normal. Will consider getting dedicated liver imaging when patient improves (oxygen requirements lessen)      Patient remains very sick.  Repeat imaging. Dr. Hernandez following closely.  Will adjust pain medications.                     Alice Kevin DO

## 2024-12-13 NOTE — ASSESSMENT & PLAN NOTE
Liver function normal. Will consider getting dedicated liver imaging when patient improves (oxygen requirements lessen)

## 2024-12-13 NOTE — PROGRESS NOTES
Occupational Therapy    Evaluation    Patient Name: Alanna Hickman  MRN: [id number]  Department: [address]  Room: [address] [place] [address]  Today's Date: 12/13/2024  Time Calculation  Start Time: 0935  Stop Time: 0955  Time Calculation (min): 20 min        Assessment:  OT Assessment: pt presents with SOB, poor endurance and decreased tolerance which impedes ADL performance. pt would benefit from skilled OT services to address these deficits and to facilitate highest level of independence.  Prognosis: Fair  Barriers to Discharge Home: Caregiver assistance  Caregiver Assistance:  (has x4 children)  Evaluation/Treatment Tolerance: Patient limited by fatigue  Medical Staff Made Aware: Yes  End of Session Communication: Bedside nurse  End of Session Patient Position: Up in chair, Alarm off, not on at start of session  OT Assessment Results: Decreased ADL status, Decreased cognition, Decreased endurance, Decreased functional mobility  Prognosis: Fair  Barriers to Discharge: Decreased caregiver support  Evaluation/Treatment Tolerance: Patient limited by fatigue  Medical Staff Made Aware: Yes  Strengths: Ability to acquire knowledge  Barriers to Participation: Attitude of self, Comorbidities  Plan:  Treatment Interventions: ADL retraining, Functional transfer training, UE strengthening/ROM, Endurance training, Equipment evaluation/education, Compensatory technique education  OT Frequency: 3 times per week  OT Discharge Recommendations: Moderate intensity level of continued care  Equipment Recommended upon Discharge: Bedside commode  OT Recommended Transfer Status: Assist of 1, Minimal assist  OT - OK to Discharge: Yes  Treatment Interventions: ADL retraining, Functional transfer training, UE strengthening/ROM, Endurance training, Equipment evaluation/education, Compensatory technique education    Subjective     General:  General  Reason for Referral: ADL impairment; 44 y.o. female presenting with Bacterial pneumonia and hypoxic  respiratory failure.  Past Medical History Relevant to Rehab: asthma, CVA on plavix, DM, smoker, possible spondyloarthropathy, severe SANGEETA,  Family/Caregiver Present: No  Prior to Session Communication: Bedside nurse  Patient Position Received: Bed, 3 rail up, Alarm off, not on at start of session  Preferred Learning Style: verbal, visual  General Comment: pt with self-limiting behavior and required encouragement to participate.  pt also anxious and difficulty sequencing tasks requiring intermittent cues  Precautions:  Medical Precautions: Fall precautions, Infection precautions, Oxygen therapy device and L/min (Droplet)  Precautions Comment: 40L, 60% High flow    Vital Sign (Past 2hrs)        Date/Time Vitals Session Patient Position Pulse Resp SpO2 BP MAP (mmHg)    12/13/24 0935 During OT  --  102  --  93 %  --  --                         Pain:  Pain Assessment  Pain Assessment: 0-10  0-10 (Numeric) Pain Score: 3  Pain Type: Chronic pain  Pain Location: Rib cage    Objective   Cognition:  Overall Cognitive Status: Impaired  Orientation Level: Oriented X4  Following Commands: Follows one step commands with increased time  Problem Solving: Assistance required to generate solutions  Cognition Comments: anxious, difficulty problem solving and spatial awareness           Home Living:  Type of Home: House  Lives With: Adult children, Dependent children (x4 kids, 2 are old enough to take care of themselves)  Home Adaptive Equipment:  (rollator)  Home Layout: Multi-level (patient stays on first level and does not do stairs at baseline)  Home Access: Stairs to enter with rails  Entrance Stairs-Rails:  (single)  Entrance Stairs-Number of Steps: 3  Bathroom Shower/Tub: Tub/shower unit  Bathroom Toilet: Standard  Bathroom Equipment: Tub transfer bench, Grab bars around toilet  Bathroom Accessibility: main level  Home Living Comments: stays on first floor  Prior Function:  Level of [place]: Independent with ADLs and  functional transfers, Needs assistance with homemaking  Receives Help From: Family  ADL Assistance: Independent  Homemaking Assistance:  (older children and their father help with IADLs)  Ambulatory Assistance: Independent (with rollator)  Vocational: On disability  Prior Function Comments: does not drive  IADL History:     ADL:  Eating Assistance: Independent (anticipated)  Grooming Assistance: Minimal (anticipated)  Bathing Assistance: Moderate (anticipated)  UE Dressing Assistance: Minimal (anticipated)  LE Dressing Assistance: Moderate (assist for threading BLE into new brief due to SOB and fatigue.  pt was able to stand unsupported and manage clothing over hips at CGA)  Toileting Assistance with Device: Moderate (Assist for doffing brief prior toileting, also required assistance for hygiene 2/2 fatigue/SOB)  Activity Tolerance:  Endurance: Decreased tolerance for upright activites  Bed Mobility/Transfers: Bed Mobility  Bed Mobility: Yes  Bed Mobility 1  Bed Mobility 1: Supine to sitting  Level of Assistance 1: Close supervision  Bed Mobility Comments 1: No physical assistance, mild SOB after transition    Transfers  Transfer: Yes  Transfer 1  Technique 1: Sit to stand, Stand to sit  Transfer Level of Assistance 1: Contact guard  Trials/Comments 1: from edge of bed and from standard chair, pt with fair control when ascending/descending; effortful  Transfers 2  Transfer From 2: Bed to  Transfer to 2: Commode-standard  Technique 2: Stand pivot  Transfer Level of Assistance 2: Minimum assistance  Trials/Comments 2: standing pivot without a device; pt performed at CGA however required MIN A for guiding trunk into correct position prior to descending onto BSC.  pt having difficulty with spatial awareness and also requiring cues for sequencing/positioning and transfer  Transfers 3  Transfer From 3: Commode-standard to  Transfer to 3: Chair with arms  Technique 3: Stand pivot  Transfer Level of Assistance 3: Minimum  assistance  Trials/Comments 3: pt transferred to chair at end of session w/o a device; performed at [facility] however with MIN A for guiding trunk into proper position prior to descending    Sitting Balance:  Static Sitting Balance  Static Sitting-Balance Support: Feet supported  Static Sitting-Level of Assistance: Distant supervision  Standing Balance:  Static Standing Balance  Static Standing-Balance Support: No upper extremity supported  Static Standing-Level of Assistance: Contact guard  Static Standing-Comment/Number of Minutes: 2-3 min during toileting      Vision:Vision - Basic Assessment  Current Vision: Wears glasses all the time  Sensation:  Light Touch: No apparent deficits  Strength:  Strength Comments: BUE grossly 3+/5  Perception:  Motor Planning:  (difficulty sequencing transfers and spatial awareness)  Coordination:  Movements are Fluid and Coordinated: No  Upper Body Coordination: decreased rate and accuracy of movements   Hand Function:  Gross Grasp: Functional  Coordination: Functional  Extremities: RUE   RUE : Within Functional Limits and LUE   LUE: Within Functional Limits    Outcome Measures:[facility] Daily Activity  Putting on and taking off regular lower body clothing: A lot  Bathing (including washing, rinsing, drying): A lot  Putting on and taking off regular upper body clothing: A little  Toileting, which includes using toilet, bedpan or urinal: A lot  Taking care of personal grooming such as brushing teeth: A little  Eating Meals: None  Daily Activity - Total Score: 16        Education Documentation  Body Mechanics, taught by Brando Whitifeld OT at 12/13/2024 11:03 AM.  Learner: Patient  Readiness: Acceptance  Method: Explanation, Demonstration  Response: Needs Reinforcement    ADL Training, taught by Brando Whitfield OT at 12/13/2024 11:03 AM.  Learner: Patient  Readiness: Acceptance  Method: Explanation, Demonstration  Response: Needs Reinforcement    Education Comments  No comments  found.        OP EDUCATION:       Goals:  Encounter Problems       Encounter Problems (Active)       ADLs       Patient with complete upper body dressing with independent level of assistance donning and doffing all UE clothes while edge of bed  (Progressing)       Start:  12/13/24    Expected End:  01/13/25            Patient with complete lower body dressing with modified independent level of assistance donning and doffing all LE clothes  with PRN adaptive equipment while edge of bed  (Progressing)       Start:  12/13/24    Expected End:  01/13/25            Patient will complete daily grooming tasks with independent level of assistance and PRN adaptive equipment while standing. (Progressing)       Start:  12/13/24    Expected End:  01/13/25            Patient will complete toileting including hygiene clothing management/hygiene with modified independent level of assistance and grab bars. (Progressing)       Start:  12/13/24    Expected End:  01/13/25               MOBILITY       Patient will perform Functional mobility mod  Household distances/Community Distances with modified independent level of assistance and least restrictive device in order to improve safety and functional mobility. (Progressing)       Start:  12/13/24    Expected End:  01/13/25

## 2024-12-13 NOTE — CONSULTS
Inpatient consult to Psychiatry  Consult performed by: CHICHI Barfield-PIA  Consult ordered by: Alice Kevin DO  Reason for consult: depression/anxiety      The chart has been reviewed, and the patient's case was discussed with the interdisciplinary team. Psychiatry was consulted due to concerns about the patient experiencing depression and anxiety. The patient exhibits a lack of motivation, a flat affect, and requests frequent active engagement, indicating that the family is concerned the patient would benefit from psychiatric intervention.    During our face-to-face encounter with the patient, she stated that she doesn’t feel depressed. She expressed anxiety, stating that she feels as though she can’t breathe, which she relates to her medical condition, but she insists that she does not feel depressed. The patient denies having suicidal or homicidal ideation and reports no auditory or visual hallucinations. She acknowledges having sleep issues, which are currently being addressed with her medication regimen while hospitalized. The patient is on Restoril 15 mg and melatonin 5 mg at bedtime PRN, and she receives 25 mg of hydroxyzine every six hours PRN as needed for anxiety.    At this time, we do not see any acute concerns that need to be addressed. We offered the patient outpatient resources, but she did not appear to be interested at this time. We informed her to let the nurse know if anything changes.    Psychiatry will sign off for now.

## 2024-12-13 NOTE — NURSING NOTE
Rounded on pt. Pt laying in bed, eyes closed, respirations even and unlabored. Pt appears to be sleeping and in no apparent pain or distress. Besides pt waking up in a panic having found her HF NC off, not being able to find it, then feeling like she couldn't breath after it was on; uneventful night with no changes since prior assessment. I gave pt her PRN hycodan, oxycodone, and vistaril at 0314 and called for a breathing treatment which she received several minutes later at 0334. Call bell and belongings are placed in reach. Telemetry leads connected and reading on monitor. HF NC O2 in place with no audible leaks or alarms. Bed alarm refused. Continuing to monitor.

## 2024-12-13 NOTE — CARE PLAN
The patient's goals for the shift include sleep and control cough    The clinical goals for the shift include maintain pulse ox 92% or greater, manage cough/pain

## 2024-12-13 NOTE — PROGRESS NOTES
Pt is on high flow 02, PT/OT ordered. Care transitions will follow for possible therapy needs at [facility].      12/13/24 1330   Discharge Planning   Expected Discharge Disposition Home

## 2024-12-13 NOTE — ASSESSMENT & PLAN NOTE
Respiratory status today.  I increased her to Vapotherm.  Still with tachypnea and respiratory distress on this.  Ordered ABG which showed somewhat higher pCO2 but still compensated.  Her pO2 remains somewhat low on 60% FiO2.  I discussed the case extensively with Dr. Hernandez.  Repeat ABG was similar.  Repeat imaging shows worsening infiltrates.  There is  also an abnormal read of liver-- liver function normal

## 2024-12-14 LAB
ALBUMIN SERPL BCP-MCNC: 3.3 G/DL (ref 3.4–5)
ALP SERPL-CCNC: 68 U/L (ref 33–110)
ALT SERPL W P-5'-P-CCNC: 28 U/L (ref 7–45)
ANION GAP SERPL CALCULATED.3IONS-SCNC: 10 MMOL/L (ref 10–20)
AST SERPL W P-5'-P-CCNC: 16 U/L (ref 9–39)
BILIRUB SERPL-MCNC: 0.5 MG/DL (ref 0–1.2)
BUN SERPL-MCNC: 14 MG/DL (ref 6–23)
CALCIUM SERPL-MCNC: 8.8 MG/DL (ref 8.6–10.3)
CHLORIDE SERPL-SCNC: 90 MMOL/L (ref 98–107)
CO2 SERPL-SCNC: 38 MMOL/L (ref 21–32)
CREAT SERPL-MCNC: 0.43 MG/DL (ref 0.5–1.05)
EGFRCR SERPLBLD CKD-EPI 2021: >90 ML/MIN/1.73M*2
ERYTHROCYTE [DISTWIDTH] IN BLOOD BY AUTOMATED COUNT: 14 % (ref 11.5–14.5)
GLUCOSE BLD MANUAL STRIP-MCNC: 165 MG/DL (ref 74–99)
GLUCOSE BLD MANUAL STRIP-MCNC: 175 MG/DL (ref 74–99)
GLUCOSE BLD MANUAL STRIP-MCNC: 252 MG/DL (ref 74–99)
GLUCOSE BLD MANUAL STRIP-MCNC: 270 MG/DL (ref 74–99)
GLUCOSE BLD MANUAL STRIP-MCNC: 371 MG/DL (ref 74–99)
GLUCOSE BLD MANUAL STRIP-MCNC: 426 MG/DL (ref 74–99)
GLUCOSE SERPL-MCNC: 170 MG/DL (ref 74–99)
HCT VFR BLD AUTO: 41.5 % (ref 36–46)
HGB BLD-MCNC: 13.8 G/DL (ref 12–16)
MCH RBC QN AUTO: 32.8 PG (ref 26–34)
MCHC RBC AUTO-ENTMCNC: 33.3 G/DL (ref 32–36)
MCV RBC AUTO: 99 FL (ref 80–100)
NRBC BLD-RTO: 0 /100 WBCS (ref 0–0)
PLATELET # BLD AUTO: 256 X10*3/UL (ref 150–450)
POTASSIUM SERPL-SCNC: 3.8 MMOL/L (ref 3.5–5.3)
PROT SERPL-MCNC: 6.5 G/DL (ref 6.4–8.2)
RBC # BLD AUTO: 4.21 X10*6/UL (ref 4–5.2)
SODIUM SERPL-SCNC: 134 MMOL/L (ref 136–145)
WBC # BLD AUTO: 8.8 X10*3/UL (ref 4.4–11.3)

## 2024-12-14 PROCEDURE — S4991 NICOTINE PATCH NONLEGEND: HCPCS | Performed by: INTERNAL MEDICINE

## 2024-12-14 PROCEDURE — 1200000002 HC GENERAL ROOM WITH TELEMETRY DAILY

## 2024-12-14 PROCEDURE — 2500000005 HC RX 250 GENERAL PHARMACY W/O HCPCS: Performed by: HOSPITALIST

## 2024-12-14 PROCEDURE — 9420000001 HC RT PATIENT EDUCATION 5 MIN

## 2024-12-14 PROCEDURE — 94640 AIRWAY INHALATION TREATMENT: CPT

## 2024-12-14 PROCEDURE — 99232 SBSQ HOSP IP/OBS MODERATE 35: CPT | Performed by: INTERNAL MEDICINE

## 2024-12-14 PROCEDURE — 2500000004 HC RX 250 GENERAL PHARMACY W/ HCPCS (ALT 636 FOR OP/ED): Performed by: INTERNAL MEDICINE

## 2024-12-14 PROCEDURE — 85027 COMPLETE CBC AUTOMATED: CPT | Performed by: HOSPITALIST

## 2024-12-14 PROCEDURE — 94668 MNPJ CHEST WALL SBSQ: CPT

## 2024-12-14 PROCEDURE — 2500000001 HC RX 250 WO HCPCS SELF ADMINISTERED DRUGS (ALT 637 FOR MEDICARE OP): Performed by: HOSPITALIST

## 2024-12-14 PROCEDURE — 2500000002 HC RX 250 W HCPCS SELF ADMINISTERED DRUGS (ALT 637 FOR MEDICARE OP, ALT 636 FOR OP/ED): Performed by: STUDENT IN AN ORGANIZED HEALTH CARE EDUCATION/TRAINING PROGRAM

## 2024-12-14 PROCEDURE — 82947 ASSAY GLUCOSE BLOOD QUANT: CPT

## 2024-12-14 PROCEDURE — 2500000002 HC RX 250 W HCPCS SELF ADMINISTERED DRUGS (ALT 637 FOR MEDICARE OP, ALT 636 FOR OP/ED): Performed by: HOSPITALIST

## 2024-12-14 PROCEDURE — 80053 COMPREHEN METABOLIC PANEL: CPT | Performed by: HOSPITALIST

## 2024-12-14 PROCEDURE — 36415 COLL VENOUS BLD VENIPUNCTURE: CPT | Performed by: HOSPITALIST

## 2024-12-14 PROCEDURE — 99232 SBSQ HOSP IP/OBS MODERATE 35: CPT | Performed by: HOSPITALIST

## 2024-12-14 PROCEDURE — 2500000004 HC RX 250 GENERAL PHARMACY W/ HCPCS (ALT 636 FOR OP/ED): Performed by: STUDENT IN AN ORGANIZED HEALTH CARE EDUCATION/TRAINING PROGRAM

## 2024-12-14 PROCEDURE — 2500000002 HC RX 250 W HCPCS SELF ADMINISTERED DRUGS (ALT 637 FOR MEDICARE OP, ALT 636 FOR OP/ED): Performed by: INTERNAL MEDICINE

## 2024-12-14 PROCEDURE — 2500000001 HC RX 250 WO HCPCS SELF ADMINISTERED DRUGS (ALT 637 FOR MEDICARE OP): Performed by: INTERNAL MEDICINE

## 2024-12-14 PROCEDURE — 94660 CPAP INITIATION&MGMT: CPT

## 2024-12-14 RX ORDER — ACETAZOLAMIDE 500 MG/5ML
250 INJECTION, POWDER, LYOPHILIZED, FOR SOLUTION INTRAVENOUS 2 TIMES DAILY
Status: COMPLETED | OUTPATIENT
Start: 2024-12-14 | End: 2024-12-14

## 2024-12-14 RX ORDER — INSULIN LISPRO 100 [IU]/ML
15 INJECTION, SOLUTION INTRAVENOUS; SUBCUTANEOUS ONCE
Status: COMPLETED | OUTPATIENT
Start: 2024-12-14 | End: 2024-12-14

## 2024-12-14 RX ORDER — PREDNISONE 20 MG/1
40 TABLET ORAL
Status: DISCONTINUED | OUTPATIENT
Start: 2024-12-14 | End: 2024-12-16

## 2024-12-14 RX ORDER — INSULIN GLARGINE 100 [IU]/ML
35 INJECTION, SOLUTION SUBCUTANEOUS NIGHTLY
Status: DISCONTINUED | OUTPATIENT
Start: 2024-12-14 | End: 2024-12-15

## 2024-12-14 RX ADMIN — Medication 35 L/MIN: at 19:13

## 2024-12-14 RX ADMIN — PIPERACILLIN SODIUM AND TAZOBACTAM SODIUM 3.38 G: 3; .375 INJECTION, SOLUTION INTRAVENOUS at 17:25

## 2024-12-14 RX ADMIN — HYDROXYZINE HYDROCHLORIDE 25 MG: 25 TABLET ORAL at 23:58

## 2024-12-14 RX ADMIN — INSULIN LISPRO 15 UNITS: 100 INJECTION, SOLUTION INTRAVENOUS; SUBCUTANEOUS at 21:15

## 2024-12-14 RX ADMIN — INSULIN LISPRO 2 UNITS: 100 INJECTION, SOLUTION INTRAVENOUS; SUBCUTANEOUS at 09:39

## 2024-12-14 RX ADMIN — PIPERACILLIN SODIUM AND TAZOBACTAM SODIUM 3.38 G: 3; .375 INJECTION, SOLUTION INTRAVENOUS at 11:42

## 2024-12-14 RX ADMIN — Medication 35 L/MIN: at 00:25

## 2024-12-14 RX ADMIN — INSULIN LISPRO 6 UNITS: 100 INJECTION, SOLUTION INTRAVENOUS; SUBCUTANEOUS at 17:25

## 2024-12-14 RX ADMIN — PREDNISONE 40 MG: 20 TABLET ORAL at 17:25

## 2024-12-14 RX ADMIN — PANTOPRAZOLE SODIUM 20 MG: 20 TABLET, DELAYED RELEASE ORAL at 06:00

## 2024-12-14 RX ADMIN — BENZONATATE 100 MG: 100 CAPSULE ORAL at 09:28

## 2024-12-14 RX ADMIN — NICOTINE 1 PATCH: 21 PATCH, EXTENDED RELEASE TRANSDERMAL at 09:28

## 2024-12-14 RX ADMIN — CLOPIDOGREL BISULFATE 75 MG: 75 TABLET ORAL at 09:28

## 2024-12-14 RX ADMIN — HYDROXYZINE HYDROCHLORIDE 25 MG: 25 TABLET ORAL at 18:55

## 2024-12-14 RX ADMIN — BENZONATATE 100 MG: 100 CAPSULE ORAL at 17:24

## 2024-12-14 RX ADMIN — Medication 35 L/MIN: at 04:55

## 2024-12-14 RX ADMIN — GUAIFENESIN SYRUP AND DEXTROMETHORPHAN 5 ML: 100; 10 SYRUP ORAL at 06:02

## 2024-12-14 RX ADMIN — DAPAGLIFLOZIN 10 MG: 10 TABLET, FILM COATED ORAL at 09:28

## 2024-12-14 RX ADMIN — IPRATROPIUM BROMIDE AND ALBUTEROL SULFATE 3 ML: 2.5; .5 SOLUTION RESPIRATORY (INHALATION) at 11:33

## 2024-12-14 RX ADMIN — OXYCODONE 7.5 MG: 5 TABLET ORAL at 17:53

## 2024-12-14 RX ADMIN — HYDROXYZINE HYDROCHLORIDE 25 MG: 25 TABLET ORAL at 11:39

## 2024-12-14 RX ADMIN — PIPERACILLIN SODIUM AND TAZOBACTAM SODIUM 3.38 G: 3; .375 INJECTION, SOLUTION INTRAVENOUS at 05:01

## 2024-12-14 RX ADMIN — ENOXAPARIN SODIUM 40 MG: 40 INJECTION SUBCUTANEOUS at 21:17

## 2024-12-14 RX ADMIN — IPRATROPIUM BROMIDE AND ALBUTEROL SULFATE 3 ML: 2.5; .5 SOLUTION RESPIRATORY (INHALATION) at 15:03

## 2024-12-14 RX ADMIN — HYDROXYZINE HYDROCHLORIDE 25 MG: 25 TABLET ORAL at 04:05

## 2024-12-14 RX ADMIN — BENZONATATE 100 MG: 100 CAPSULE ORAL at 21:17

## 2024-12-14 RX ADMIN — Medication 35 L/MIN: at 07:20

## 2024-12-14 RX ADMIN — PREDNISONE 40 MG: 20 TABLET ORAL at 09:28

## 2024-12-14 RX ADMIN — ACETAZOLAMIDE 250 MG: 500 INJECTION, POWDER, LYOPHILIZED, FOR SOLUTION INTRAVENOUS at 21:31

## 2024-12-14 RX ADMIN — ACETYLCYSTEINE 200 MG: 200 SOLUTION ORAL; RESPIRATORY (INHALATION) at 19:13

## 2024-12-14 RX ADMIN — ACETYLCYSTEINE 600 MG: 200 SOLUTION ORAL; RESPIRATORY (INHALATION) at 07:20

## 2024-12-14 RX ADMIN — POLYETHYLENE GLYCOL 3350 17 G: 17 POWDER, FOR SOLUTION ORAL at 21:17

## 2024-12-14 RX ADMIN — INSULIN LISPRO 10 UNITS: 100 INJECTION, SOLUTION INTRAVENOUS; SUBCUTANEOUS at 13:00

## 2024-12-14 RX ADMIN — IPRATROPIUM BROMIDE AND ALBUTEROL SULFATE 3 ML: 2.5; .5 SOLUTION RESPIRATORY (INHALATION) at 07:20

## 2024-12-14 RX ADMIN — POLYETHYLENE GLYCOL 3350 17 G: 17 POWDER, FOR SOLUTION ORAL at 09:28

## 2024-12-14 RX ADMIN — AZITHROMYCIN 500 MG: 500 TABLET, FILM COATED ORAL at 09:28

## 2024-12-14 RX ADMIN — OXYCODONE 7.5 MG: 5 TABLET ORAL at 10:48

## 2024-12-14 RX ADMIN — PIPERACILLIN SODIUM AND TAZOBACTAM SODIUM 3.38 G: 3; .375 INJECTION, SOLUTION INTRAVENOUS at 23:58

## 2024-12-14 RX ADMIN — ACETAZOLAMIDE 250 MG: 500 INJECTION, POWDER, LYOPHILIZED, FOR SOLUTION INTRAVENOUS at 10:48

## 2024-12-14 RX ADMIN — IPRATROPIUM BROMIDE AND ALBUTEROL SULFATE 3 ML: 2.5; .5 SOLUTION RESPIRATORY (INHALATION) at 19:13

## 2024-12-14 RX ADMIN — HYDROCODONE BITARTRATE AND HOMATROPINE METHYLBROMIDE 5 ML: 5; 1.5 SOLUTION ORAL at 23:58

## 2024-12-14 RX ADMIN — INSULIN GLARGINE 35 UNITS: 100 INJECTION, SOLUTION SUBCUTANEOUS at 21:14

## 2024-12-14 RX ADMIN — OXYCODONE 7.5 MG: 5 TABLET ORAL at 04:04

## 2024-12-14 RX ADMIN — HYDROCODONE BITARTRATE AND HOMATROPINE METHYLBROMIDE 5 ML: 5; 1.5 SOLUTION ORAL at 17:24

## 2024-12-14 ASSESSMENT — PAIN DESCRIPTION - LOCATION
LOCATION: CHEST
LOCATION: CHEST

## 2024-12-14 ASSESSMENT — PAIN - FUNCTIONAL ASSESSMENT
PAIN_FUNCTIONAL_ASSESSMENT: 0-10

## 2024-12-14 ASSESSMENT — COGNITIVE AND FUNCTIONAL STATUS - GENERAL
MOBILITY SCORE: 22
CLIMB 3 TO 5 STEPS WITH RAILING: A LITTLE
WALKING IN HOSPITAL ROOM: A LITTLE
CLIMB 3 TO 5 STEPS WITH RAILING: A LITTLE
DAILY ACTIVITIY SCORE: 24
WALKING IN HOSPITAL ROOM: A LITTLE
MOBILITY SCORE: 22
DAILY ACTIVITIY SCORE: 24

## 2024-12-14 ASSESSMENT — PAIN DESCRIPTION - DESCRIPTORS
DESCRIPTORS: DISCOMFORT
DESCRIPTORS: ACHING
DESCRIPTORS: ACHING
DESCRIPTORS: DISCOMFORT
DESCRIPTORS: DISCOMFORT

## 2024-12-14 ASSESSMENT — PAIN SCALES - GENERAL
PAINLEVEL_OUTOF10: 2
PAINLEVEL_OUTOF10: 6
PAINLEVEL_OUTOF10: 7
PAINLEVEL_OUTOF10: 4
PAINLEVEL_OUTOF10: 3
PAINLEVEL_OUTOF10: 7

## 2024-12-14 NOTE — CARE PLAN
Problem: Respiratory  Goal: Clear secretions with interventions this shift  Outcome: Progressing  Goal: Minimal/no exertional discomfort or dyspnea this shift  Outcome: Progressing  Goal: Verbalize decreased shortness of breath this shift  Outcome: Progressing  Goal: Wean oxygen to maintain O2 saturation per order/standard this shift  Outcome: Progressing

## 2024-12-14 NOTE — CARE PLAN
The patient's goals for the shift include sleep and control cough    The clinical goals for the shift include sleep and control cough      Problem: Pain - Adult  Goal: Verbalizes/displays adequate comfort level or baseline comfort level  Outcome: Progressing     Problem: Safety - Adult  Goal: Free from fall injury  Outcome: Progressing     Problem: Discharge Planning  Goal: Discharge to home or other facility with appropriate resources  Outcome: Progressing     Problem: Chronic Conditions and Co-morbidities  Goal: Patient's chronic conditions and co-morbidity symptoms are monitored and maintained or improved  Outcome: Progressing     Problem: Respiratory  Goal: Clear secretions with interventions this shift  Outcome: Progressing  Goal: Minimize anxiety/maximize coping throughout shift  Outcome: Progressing  Goal: Minimal/no exertional discomfort or dyspnea this shift  Outcome: Progressing  Goal: No signs of respiratory distress (eg. Use of accessory muscles. Peds grunting)  Outcome: Progressing  Goal: Patent airway maintained this shift  Outcome: Progressing  Goal: Tolerate pulmonary toileting this shift  Outcome: Progressing  Goal: Verbalize decreased shortness of breath this shift  Outcome: Progressing  Goal: Wean oxygen to maintain O2 saturation per order/standard this shift  Outcome: Progressing  Goal: Increase self care and/or family involvement in next 24 hours  Outcome: Progressing     Problem: Fall/Injury  Goal: Not fall by end of shift  Outcome: Progressing  Goal: Be free from injury by end of the shift  Outcome: Progressing  Goal: Verbalize understanding of personal risk factors for fall in the hospital  Outcome: Progressing  Goal: Verbalize understanding of risk factor reduction measures to prevent injury from fall in the home  Outcome: Progressing  Goal: Use assistive devices by end of the shift  Outcome: Progressing  Goal: Pace activities to prevent fatigue by end of the shift  Outcome: Progressing      Problem: Diabetes  Goal: Achieve decreasing blood glucose levels by end of shift  Outcome: Progressing  Goal: Increase stability of blood glucose readings by end of shift  Outcome: Progressing  Goal: Decrease in ketones present in urine by end of shift  Outcome: Progressing  Goal: Maintain electrolyte levels within acceptable range throughout shift  Outcome: Progressing  Goal: Maintain glucose levels >70mg/dl to <250mg/dl throughout shift  Outcome: Progressing  Goal: No changes in neurological exam by end of shift  Outcome: Progressing  Goal: Learn about and adhere to nutrition recommendations by end of shift  Outcome: Progressing  Goal: Vital signs within normal range for age by end of shift  Outcome: Progressing  Goal: Increase self care and/or family involovement by end of shift  Outcome: Progressing  Goal: Receive DSME education by end of shift  Outcome: Progressing

## 2024-12-14 NOTE — CARE PLAN
The patient's goals for the shift include sleep and control cough    The clinical goals for the shift include safety and stable vitals      Problem: Pain - Adult  Goal: Verbalizes/displays adequate comfort level or baseline comfort level  Outcome: Progressing     Problem: Safety - Adult  Goal: Free from fall injury  Outcome: Progressing     Problem: Discharge Planning  Goal: Discharge to home or other facility with appropriate resources  Outcome: Progressing     Problem: Chronic Conditions and Co-morbidities  Goal: Patient's chronic conditions and co-morbidity symptoms are monitored and maintained or improved  Outcome: Progressing     Problem: Respiratory  Goal: Clear secretions with interventions this shift  Outcome: Progressing  Goal: Minimize anxiety/maximize coping throughout shift  Outcome: Progressing  Goal: Minimal/no exertional discomfort or dyspnea this shift  Outcome: Progressing  Goal: No signs of respiratory distress (eg. Use of accessory muscles. Peds grunting)  Outcome: Progressing  Goal: Patent airway maintained this shift  Outcome: Progressing  Goal: Tolerate pulmonary toileting this shift  Outcome: Progressing  Goal: Verbalize decreased shortness of breath this shift  Outcome: Progressing  Goal: Wean oxygen to maintain O2 saturation per order/standard this shift  Outcome: Progressing  Goal: Increase self care and/or family involvement in next 24 hours  Outcome: Progressing     Problem: Fall/Injury  Goal: Not fall by end of shift  Outcome: Progressing  Goal: Be free from injury by end of the shift  Outcome: Progressing  Goal: Verbalize understanding of personal risk factors for fall in the hospital  Outcome: Progressing  Goal: Verbalize understanding of risk factor reduction measures to prevent injury from fall in the home  Outcome: Progressing  Goal: Use assistive devices by end of the shift  Outcome: Progressing  Goal: Pace activities to prevent fatigue by end of the shift  Outcome: Progressing      Problem: Diabetes  Goal: Achieve decreasing blood glucose levels by end of shift  Outcome: Progressing  Goal: Increase stability of blood glucose readings by end of shift  Outcome: Progressing  Goal: Decrease in ketones present in urine by end of shift  Outcome: Progressing  Goal: Maintain electrolyte levels within acceptable range throughout shift  Outcome: Progressing  Goal: Maintain glucose levels >70mg/dl to <250mg/dl throughout shift  Outcome: Progressing  Goal: No changes in neurological exam by end of shift  Outcome: Progressing  Goal: Learn about and adhere to nutrition recommendations by end of shift  Outcome: Progressing  Goal: Vital signs within normal range for age by end of shift  Outcome: Progressing  Goal: Increase self care and/or family involovement by end of shift  Outcome: Progressing  Goal: Receive DSME education by end of shift  Outcome: Progressing

## 2024-12-14 NOTE — PROGRESS NOTES
"Pulmonary Daily Progress Note   Subjective    Alanna Hickman is a 44 y.o. year old female patient known with asthma, CVA on plavix, DM, smoker, possible spondyloarthropathy, severe SANGEETA, prescribed PAP therapy but had trouble with machine, admitted on 12/8/2024 with following acute hypoxic respiratory failure due to pneumonia. Pulmonary are consulted for the for respiratory failure and pneumonia.    Interval History:  Remains on high flow  States she is very tired this AM    Meds    Scheduled medications  acetazolamide, 250 mg, intravenous, BID  acetylcysteine, 3 mL, nebulization, BID  azithromycin, 500 mg, oral, q24h MARY  benzonatate, 100 mg, oral, TID  clopidogrel, 75 mg, oral, Daily  dapagliflozin propanediol, 10 mg, oral, Daily  enoxaparin, 40 mg, subcutaneous, q24h  insulin glargine, 30 Units, subcutaneous, Nightly  insulin lispro, 0-10 Units, subcutaneous, TID AC  ipratropium-albuteroL, 3 mL, nebulization, 4x daily  nicotine, 1 patch, transdermal, q24h [facility]  oxygen, , inhalation, Continuous - Inhalation  pantoprazole, 20 mg, oral, Daily before breakfast  perflutren protein A microsphere, 0.5 mL, intravenous, Once in imaging  piperacillin-tazobactam, 3.375 g, intravenous, q6h  polyethylene glycol, 17 g, oral, BID  predniSONE, 40 mg, oral, BID  sulfur hexafluoride microsphr, 2 mL, intravenous, Once in imaging      Continuous medications     PRN medications  PRN medications: acetaminophen **OR** acetaminophen **OR** acetaminophen, benzocaine-menthol, dextromethorphan-guaifenesin, hydrocodone-homatropine, hydrOXYzine HCL, ipratropium-albuteroL, melatonin, ondansetron ODT **OR** ondansetron, oxyCODONE, temazepam     Objective    Blood pressure 117/79, pulse 103, temperature 37 °C (98.6 °F), temperature source Oral, resp. rate 19, height 1.702 m (5' 7\"), weight 107 kg (235 lb), SpO2 94%.   Physical Exam   GENERAL: Mild tachypnea  HEAD/SINUSES: On nasal cannula 4-5 L  NECK: Large neck  LUNGS: Decreased air entry, " some wheezing and rhonchi  CARDIAC: Regular rate and rhythm  EXTREMITIES: No edema  NEURO: grossly normal mental status,   SKIN: Skin turgor normal.   PSYCH: Anxious    Intake/Output Summary (Last 24 hours) at 12/14/2024 1021  Last data filed at 12/14/2024 1014  Gross per 24 hour   Intake 1300 ml   Output 1375 ml   Net -75 ml     Labs:   Results from last 72 hours   Lab Units 12/14/24  0501 12/13/24  0534 12/12/24  0534   SODIUM mmol/L 134* 131* 131*   POTASSIUM mmol/L 3.8 4.2 4.6   CHLORIDE mmol/L 90* 92* 92*   CO2 mmol/L 38* 34* 32   BUN mg/dL 14 12 12   CREATININE mg/dL 0.43* 0.48* 0.49*   GLUCOSE mg/dL 170* 282* 216*   CALCIUM mg/dL 8.8 8.9 9.0   ANION GAP mmol/L 10 9* 12   EGFR mL/min/1.73m*2 >90 >90 >90      Results from last 72 hours   Lab Units 12/14/24  0501 12/13/24  0534 12/12/24  0534   WBC AUTO x10*3/uL 8.8 8.5 9.2   HEMOGLOBIN g/dL 13.8 14.9 14.8   HEMATOCRIT % 41.5 45.2 44.6   PLATELETS AUTO x10*3/uL 256 262 258      Results from last 72 hours   Lab Units 12/12/24  1503 12/12/24  1215   POCT PH, ARTERIAL pH 7.42 7.42   POCT PCO2, ARTERIAL mm Hg 52* 56*   POCT PO2, ARTERIAL mm Hg 67* 67*   POCT SO2, ARTERIAL % 94 95          Micro/ID:   Lab Results   Component Value Date    URINECULTURE No significant growth 11/20/2023    BLOODCULT No growth at 4 days -  FINAL REPORT 12/08/2024    BLOODCULT No growth at 4 days -  FINAL REPORT 12/08/2024     Summary of key imaging results from the last 24 hours  CXR better on right     CT chest shows tree-in-bud appearance bilaterally and bronchial Dilation   TTE 2022  There is normal left ventricular systolic function.  Estimated ejection fraction is 60-64%.  No cardiac source of embolus identified.    Mycoplasma IgM positive      TTE 2024   1. Left ventricular ejection fraction is normal, by visual estimate at 60-65%.   2. Spectral Doppler shows a Grade I (impaired relaxation pattern) of left ventricular diastolic filling with normal left atrial filling pressure.   3.  There is normal right ventricular global systolic function.    Impression   Alanna Hickman is a 44 y.o. year old female patient known with asthma, CVA on plavix, DM, smoker, possible spondyloarthropathy, admitted on 12/8/2024 with following acute hypoxic respiratory failure due to pneumonia. Pulmonary are consulted for the for respiratory failure and pneumonia.  Acute hypoxic respiratory failure due to bronchopneumonia most probably infectious in nature, with history of dysphagia this could be also aspiration related however with viral prodrome before presentation favors infectious process viral versus atypical pneumonia versus bacterial.  She is not on chronic steroids however does use steroids intermittently for back pain.  History of asthma which does not seem very well-controlled, no PFTs on chart, on albuterol as needed at home. Recent exposure to parrots and she is worried that is what triggered her sx.   History of CVA and dysphagia  Smoker  History of asthma uncontrolled  SANGEETA uncontrolled, not treated as had trouble with her PAP machine   Mycoplasma pneumonia: IgM positive    Strep, legionella and MRSA normal   Pro calcitonin normal   SLP bedside no dysphagia evidence     Recommendations   As follows:  Recommend continuing antibiotics  Bronchopulmonary hygiene with Incentive spirometry  Monitor for hemoptysis  Prednisone -= increase dose for the next 48 hours due to persistent wheezing  Based on imaging finding less likely clinically picture related to hypersensitivity pneumonitis or psittacosis and with IgM mycoplasma  .   CT chest reviewed  Slightly worse findings  Trial of lasix yesterday. Clinical improvement in oxygenation. Will give another dose today  Will monitor over the weekend - may need bronch for biopsy or culture      Horace Hernandez MD   12/14/24 at 10:21 AM     -Only the Medical problems listed under impression were addressed today.   -Please contact primary team for all other concerns  and medical problem  -Thank you for your consult       Disclaimer: Documentation completed with the information available at the time of input. Parts of this note may have been scribed or generated using voice dictation software, Dragon.  Homophonic errors may exist.  Please contact me directly if clarification is needed. The times in the chart may not be reflective of actual patient care times, interventions, or procedures. Documentation occurs after the physical care of the patient.

## 2024-12-14 NOTE — NURSING NOTE
Dr. Sahu made aware of patients blood sugar, 429. Orders received for 15 units Lispro in addition to Lantus scheduled for this evening.

## 2024-12-14 NOTE — NURSING NOTE
Assumed care of patient. Patient is resting in bed. She is currently receiving a breathing treatment. Call light is within reach will continue to monitor.

## 2024-12-14 NOTE — PROGRESS NOTES
Alanna Hickman is a 44 y.o. female on day 6 of admission presenting with Bacterial pneumonia.      Subjective   Went to see patient earlier but was sleeping. Came back in order to let her rest. She says she feels okay. Reports she feels much better after getting some sleep. Says her cough has gotten much better today. Says her throat and ribs continue to hurt from all the coughing.          Objective     Last Recorded Vitals  /81 (BP Location: Left arm, Patient Position: Sitting)   Pulse 102   Temp 37.1 °C (98.8 °F) (Oral)   Resp 21   Wt 107 kg (235 lb)   SpO2 94%   Intake/Output last 3 Shifts:    Intake/Output Summary (Last 24 hours) at 12/14/2024 1553  Last data filed at 12/14/2024 1228  Gross per 24 hour   Intake 1170 ml   Output 1675 ml   Net -505 ml       Admission Weight  Weight: 107 kg (235 lb) (12/08/24 1805)    Daily Weight  12/08/24 : 107 kg (235 lb)    Image Results  CT chest wo IV contrast  Narrative: Interpreted By:  Soham Connell,   STUDY:  CT CHEST WO IV CONTRAST; 12/12/2024 2:29 pm      INDICATION:  Signs/Symptoms:pneumonia. Shortness of breath.      COMPARISON:  12/08/2024      ACCESSION NUMBER(S):  [id number]      ORDERING CLINICIAN:  DIANA BRYAN      TECHNIQUE:  The study was performed without intravenous contrast material as  requested. A helical acquisition data was obtained with multiplanar  reconstructions.      One or more of the following dose reduction techniques were used:  Automated exposure control Adjustment of the mA and/or kV according  to patient size, and/or use of iterative reconstruction technique.      FINDINGS:  Within the limits of this unenhanced study no hilar or mediastinal  lymphadenopathy is identified. *Exam is somewhat limited by motion  artifact. *There is worsening bibasilar tree-in-bud nodular  infiltrate. *There is new large patchy ground-glass infiltrate right  lower lobe 4.3 cm in diameter (Series 8, Image 208) *There are new  patches of  ground-glass infiltrate within both upper lobes, most  marked on the right where there are large patches of ground-glass  infiltrate superiorly. There is also new ground-glass infiltrate  within the superior segment left lower lobe. There is also increasing  new right middle lobe tree-in-bud nodular infiltrate. *No effusions  are identified.          Chest Wall: No chest wall masses are identified.      Skeletal System: No fractures or destructive lesions are identified.      Upper Abdomen: There are focal areas of diminished attenuation within  the medial segment left lobe, somewhat elongated tubular in  configuration along with a more focal area medially within the  posterior aspect lateral segment left lobe demonstrating fat  attenuation characteristics. Etiology for these findings is unclear  and they appear stable compared to 12/08/2024. Correlation with LFTs  is recommended. Correlation with contrast-enhanced CT of the abdomen  may also be useful.      Impression: 1. Notable worsening of the infiltrates bilaterally as described  above with worsening tree-in-bud nodular infiltrates along with  multiple new patches of ground-glass infiltrate suggesting infectious  etiology.  2. No alveolar consolidation is noted. No effusions are identified.  3. Stable but somewhat unusual appearance within the liver as  described above for which contrast-enhanced CT is recommended if and  when the patient can tolerate. Correlation with LFTs is also  recommended.      MACRO:  none      Signed by: Soham Connell 12/12/2024 4:26 PM  Dictation workstation:   [id number]      Physical Exam  General: alert, no diaphoresis.  Ill-appearing   Lungs: diffuse crackles   Heart: RRR, no LE edema BL   GI: abdomen soft, nontender, nondistended, BS present   MSK: no joint effusion or deformity   Skin: no rashes, erythema, or ecchymosis   Neuro: grossly normal cognition, motor strength, sensation    Relevant Results                Assessment/Plan                  Assessment & Plan  Bacterial pneumonia  Continue current antibiotics- azithro and zosyn.  Acute hypoxemic respiratory failure (Multi)  Respiratory status today.  I increased her to Vapotherm.  Still with tachypnea and respiratory distress on this.  Ordered ABG which showed somewhat higher pCO2 but still compensated.  Her pO2 remains somewhat low on 60% FiO2.  I discussed the case extensively with Dr. Hernandez.  Repeat ABG was similar.  Repeat imaging shows worsening infiltrates.  There is  also an abnormal read of liver-- liver function normal  Tobacco abuse  Discussed cessation, nicotine patch  DM (diabetes mellitus) (Multi)  A1c in 23 was 9.2; currently down to 7.8  Continue current dose of lantus. Still with poor glucose control in setting of systemic steroids. Will increase lantus again today if Sliding scale increase does not help   Will increase sliding scale to option 2    Dehydration  Resolved.  Off IV fluids.    Abnormal liver CT  Liver function normal. Will consider getting dedicated liver imaging when patient improves (oxygen requirements lessen)      Modest improvement overnight, patient in better spirits. Hopefully we can continue to wean.                    Alice Kevin, DO

## 2024-12-14 NOTE — ASSESSMENT & PLAN NOTE
A1c in 23 was 9.2; currently down to 7.8  Continue current dose of lantus. Still with poor glucose control in setting of systemic steroids. Will increase lantus again today if Sliding scale increase does not help   Will increase sliding scale to option 2

## 2024-12-15 VITALS
OXYGEN SATURATION: 98 % | DIASTOLIC BLOOD PRESSURE: 80 MMHG | HEART RATE: 72 BPM | BODY MASS INDEX: 36.88 KG/M2 | RESPIRATION RATE: 20 BRPM | WEIGHT: 235 LBS | TEMPERATURE: 97.5 F | SYSTOLIC BLOOD PRESSURE: 128 MMHG | HEIGHT: 67 IN

## 2024-12-15 LAB
ALBUMIN SERPL BCP-MCNC: 3.5 G/DL (ref 3.4–5)
ALP SERPL-CCNC: 75 U/L (ref 33–110)
ALT SERPL W P-5'-P-CCNC: 34 U/L (ref 7–45)
ANION GAP SERPL CALCULATED.3IONS-SCNC: 11 MMOL/L (ref 10–20)
AST SERPL W P-5'-P-CCNC: 16 U/L (ref 9–39)
BILIRUB SERPL-MCNC: 0.4 MG/DL (ref 0–1.2)
BUN SERPL-MCNC: 14 MG/DL (ref 6–23)
CALCIUM SERPL-MCNC: 9.4 MG/DL (ref 8.6–10.3)
CHLORIDE SERPL-SCNC: 96 MMOL/L (ref 98–107)
CO2 SERPL-SCNC: 27 MMOL/L (ref 21–32)
CREAT SERPL-MCNC: 0.54 MG/DL (ref 0.5–1.05)
EGFRCR SERPLBLD CKD-EPI 2021: >90 ML/MIN/1.73M*2
GLUCOSE BLD MANUAL STRIP-MCNC: 355 MG/DL (ref 74–99)
GLUCOSE BLD MANUAL STRIP-MCNC: 512 MG/DL (ref 74–99)
GLUCOSE BLD MANUAL STRIP-MCNC: 519 MG/DL (ref 74–99)
GLUCOSE BLD MANUAL STRIP-MCNC: 544 MG/DL (ref 74–99)
GLUCOSE BLD MANUAL STRIP-MCNC: 551 MG/DL (ref 74–99)
GLUCOSE SERPL-MCNC: 387 MG/DL (ref 74–99)
HOLD SPECIMEN: NORMAL
POTASSIUM SERPL-SCNC: 4.2 MMOL/L (ref 3.5–5.3)
PROT SERPL-MCNC: 7.1 G/DL (ref 6.4–8.2)
SODIUM SERPL-SCNC: 130 MMOL/L (ref 136–145)

## 2024-12-15 PROCEDURE — 94640 AIRWAY INHALATION TREATMENT: CPT

## 2024-12-15 PROCEDURE — 36415 COLL VENOUS BLD VENIPUNCTURE: CPT | Performed by: HOSPITALIST

## 2024-12-15 PROCEDURE — 2500000004 HC RX 250 GENERAL PHARMACY W/ HCPCS (ALT 636 FOR OP/ED): Performed by: INTERNAL MEDICINE

## 2024-12-15 PROCEDURE — 82947 ASSAY GLUCOSE BLOOD QUANT: CPT

## 2024-12-15 PROCEDURE — 2500000001 HC RX 250 WO HCPCS SELF ADMINISTERED DRUGS (ALT 637 FOR MEDICARE OP): Performed by: HOSPITALIST

## 2024-12-15 PROCEDURE — 80053 COMPREHEN METABOLIC PANEL: CPT | Performed by: HOSPITALIST

## 2024-12-15 PROCEDURE — 2500000002 HC RX 250 W HCPCS SELF ADMINISTERED DRUGS (ALT 637 FOR MEDICARE OP, ALT 636 FOR OP/ED): Performed by: INTERNAL MEDICINE

## 2024-12-15 PROCEDURE — 2500000005 HC RX 250 GENERAL PHARMACY W/O HCPCS: Performed by: HOSPITALIST

## 2024-12-15 PROCEDURE — 2500000004 HC RX 250 GENERAL PHARMACY W/ HCPCS (ALT 636 FOR OP/ED): Performed by: STUDENT IN AN ORGANIZED HEALTH CARE EDUCATION/TRAINING PROGRAM

## 2024-12-15 PROCEDURE — 99232 SBSQ HOSP IP/OBS MODERATE 35: CPT | Performed by: INTERNAL MEDICINE

## 2024-12-15 PROCEDURE — 2500000002 HC RX 250 W HCPCS SELF ADMINISTERED DRUGS (ALT 637 FOR MEDICARE OP, ALT 636 FOR OP/ED): Performed by: STUDENT IN AN ORGANIZED HEALTH CARE EDUCATION/TRAINING PROGRAM

## 2024-12-15 PROCEDURE — 2500000001 HC RX 250 WO HCPCS SELF ADMINISTERED DRUGS (ALT 637 FOR MEDICARE OP): Performed by: INTERNAL MEDICINE

## 2024-12-15 PROCEDURE — 94760 N-INVAS EAR/PLS OXIMETRY 1: CPT

## 2024-12-15 PROCEDURE — 2500000001 HC RX 250 WO HCPCS SELF ADMINISTERED DRUGS (ALT 637 FOR MEDICARE OP): Performed by: REGISTERED NURSE

## 2024-12-15 PROCEDURE — 2500000002 HC RX 250 W HCPCS SELF ADMINISTERED DRUGS (ALT 637 FOR MEDICARE OP, ALT 636 FOR OP/ED): Performed by: HOSPITALIST

## 2024-12-15 PROCEDURE — S4991 NICOTINE PATCH NONLEGEND: HCPCS | Performed by: INTERNAL MEDICINE

## 2024-12-15 PROCEDURE — 1200000002 HC GENERAL ROOM WITH TELEMETRY DAILY

## 2024-12-15 PROCEDURE — 94668 MNPJ CHEST WALL SBSQ: CPT

## 2024-12-15 RX ORDER — INSULIN LISPRO 100 [IU]/ML
15 INJECTION, SOLUTION INTRAVENOUS; SUBCUTANEOUS ONCE
Status: COMPLETED | OUTPATIENT
Start: 2024-12-15 | End: 2024-12-15

## 2024-12-15 RX ORDER — FUROSEMIDE 10 MG/ML
40 INJECTION INTRAMUSCULAR; INTRAVENOUS ONCE
Status: COMPLETED | OUTPATIENT
Start: 2024-12-15 | End: 2024-12-15

## 2024-12-15 RX ORDER — INSULIN LISPRO 100 [IU]/ML
6 INJECTION, SOLUTION INTRAVENOUS; SUBCUTANEOUS ONCE
Status: COMPLETED | OUTPATIENT
Start: 2024-12-15 | End: 2024-12-15

## 2024-12-15 RX ORDER — INSULIN GLARGINE 100 [IU]/ML
40 INJECTION, SOLUTION SUBCUTANEOUS NIGHTLY
Status: DISCONTINUED | OUTPATIENT
Start: 2024-12-15 | End: 2024-12-17 | Stop reason: HOSPADM

## 2024-12-15 RX ADMIN — PREDNISONE 40 MG: 20 TABLET ORAL at 08:42

## 2024-12-15 RX ADMIN — PIPERACILLIN SODIUM AND TAZOBACTAM SODIUM 3.38 G: 3; .375 INJECTION, SOLUTION INTRAVENOUS at 11:37

## 2024-12-15 RX ADMIN — INSULIN HUMAN 12 UNITS: 100 INJECTION, SUSPENSION SUBCUTANEOUS at 17:38

## 2024-12-15 RX ADMIN — IPRATROPIUM BROMIDE AND ALBUTEROL SULFATE 3 ML: 2.5; .5 SOLUTION RESPIRATORY (INHALATION) at 11:21

## 2024-12-15 RX ADMIN — HYDROXYZINE HYDROCHLORIDE 25 MG: 25 TABLET ORAL at 21:16

## 2024-12-15 RX ADMIN — OXYCODONE 7.5 MG: 5 TABLET ORAL at 14:07

## 2024-12-15 RX ADMIN — HYDROCODONE BITARTRATE AND HOMATROPINE METHYLBROMIDE 5 ML: 5; 1.5 SOLUTION ORAL at 21:16

## 2024-12-15 RX ADMIN — Medication 35 L/MIN: at 07:55

## 2024-12-15 RX ADMIN — HYDROXYZINE HYDROCHLORIDE 25 MG: 25 TABLET ORAL at 06:04

## 2024-12-15 RX ADMIN — CLOPIDOGREL BISULFATE 75 MG: 75 TABLET ORAL at 08:42

## 2024-12-15 RX ADMIN — INSULIN GLARGINE 40 UNITS: 100 INJECTION, SOLUTION SUBCUTANEOUS at 21:15

## 2024-12-15 RX ADMIN — IPRATROPIUM BROMIDE AND ALBUTEROL SULFATE 3 ML: 2.5; .5 SOLUTION RESPIRATORY (INHALATION) at 16:17

## 2024-12-15 RX ADMIN — PIPERACILLIN SODIUM AND TAZOBACTAM SODIUM 3.38 G: 3; .375 INJECTION, SOLUTION INTRAVENOUS at 06:04

## 2024-12-15 RX ADMIN — HYDROXYZINE HYDROCHLORIDE 25 MG: 25 TABLET ORAL at 14:07

## 2024-12-15 RX ADMIN — OXYCODONE 7.5 MG: 5 TABLET ORAL at 06:44

## 2024-12-15 RX ADMIN — INSULIN LISPRO 10 UNITS: 100 INJECTION, SOLUTION INTRAVENOUS; SUBCUTANEOUS at 09:01

## 2024-12-15 RX ADMIN — INSULIN LISPRO 15 UNITS: 100 INJECTION, SOLUTION INTRAVENOUS; SUBCUTANEOUS at 21:15

## 2024-12-15 RX ADMIN — METFORMIN HYDROCHLORIDE 1500 MG: 1000 TABLET ORAL at 16:45

## 2024-12-15 RX ADMIN — FUROSEMIDE 40 MG: 10 INJECTION, SOLUTION INTRAMUSCULAR; INTRAVENOUS at 11:09

## 2024-12-15 RX ADMIN — OXYCODONE 7.5 MG: 5 TABLET ORAL at 00:29

## 2024-12-15 RX ADMIN — BENZOCAINE AND MENTHOL 1 LOZENGE: 15; 3.6 LOZENGE ORAL at 21:16

## 2024-12-15 RX ADMIN — PIPERACILLIN SODIUM AND TAZOBACTAM SODIUM 3.38 G: 3; .375 INJECTION, SOLUTION INTRAVENOUS at 17:40

## 2024-12-15 RX ADMIN — TEMAZEPAM 15 MG: 15 CAPSULE ORAL at 21:16

## 2024-12-15 RX ADMIN — ENOXAPARIN SODIUM 40 MG: 40 INJECTION SUBCUTANEOUS at 21:16

## 2024-12-15 RX ADMIN — POLYETHYLENE GLYCOL 3350 17 G: 17 POWDER, FOR SOLUTION ORAL at 08:42

## 2024-12-15 RX ADMIN — BENZONATATE 100 MG: 100 CAPSULE ORAL at 14:07

## 2024-12-15 RX ADMIN — INSULIN LISPRO 10 UNITS: 100 INJECTION, SOLUTION INTRAVENOUS; SUBCUTANEOUS at 13:41

## 2024-12-15 RX ADMIN — BENZONATATE 100 MG: 100 CAPSULE ORAL at 21:16

## 2024-12-15 RX ADMIN — HYDROCODONE BITARTRATE AND HOMATROPINE METHYLBROMIDE 5 ML: 5; 1.5 SOLUTION ORAL at 14:07

## 2024-12-15 RX ADMIN — IPRATROPIUM BROMIDE AND ALBUTEROL SULFATE 3 ML: 2.5; .5 SOLUTION RESPIRATORY (INHALATION) at 07:54

## 2024-12-15 RX ADMIN — INSULIN HUMAN 5 UNITS: 100 INJECTION, SOLUTION PARENTERAL at 11:09

## 2024-12-15 RX ADMIN — Medication 35 L/MIN: at 19:24

## 2024-12-15 RX ADMIN — ACETYLCYSTEINE 600 MG: 200 SOLUTION ORAL; RESPIRATORY (INHALATION) at 19:24

## 2024-12-15 RX ADMIN — AZITHROMYCIN 500 MG: 500 TABLET, FILM COATED ORAL at 08:42

## 2024-12-15 RX ADMIN — DAPAGLIFLOZIN 10 MG: 10 TABLET, FILM COATED ORAL at 08:45

## 2024-12-15 RX ADMIN — ACETYLCYSTEINE 600 MG: 200 SOLUTION ORAL; RESPIRATORY (INHALATION) at 07:54

## 2024-12-15 RX ADMIN — BENZONATATE 100 MG: 100 CAPSULE ORAL at 08:42

## 2024-12-15 RX ADMIN — INSULIN LISPRO 6 UNITS: 100 INJECTION, SOLUTION INTRAVENOUS; SUBCUTANEOUS at 17:38

## 2024-12-15 RX ADMIN — IPRATROPIUM BROMIDE AND ALBUTEROL SULFATE 3 ML: 2.5; .5 SOLUTION RESPIRATORY (INHALATION) at 19:24

## 2024-12-15 RX ADMIN — TEMAZEPAM 15 MG: 15 CAPSULE ORAL at 00:29

## 2024-12-15 RX ADMIN — PREDNISONE 40 MG: 20 TABLET ORAL at 16:46

## 2024-12-15 RX ADMIN — PANTOPRAZOLE SODIUM 20 MG: 20 TABLET, DELAYED RELEASE ORAL at 06:04

## 2024-12-15 RX ADMIN — HYDROCODONE BITARTRATE AND HOMATROPINE METHYLBROMIDE 5 ML: 5; 1.5 SOLUTION ORAL at 06:04

## 2024-12-15 RX ADMIN — NICOTINE 1 PATCH: 21 PATCH, EXTENDED RELEASE TRANSDERMAL at 08:42

## 2024-12-15 ASSESSMENT — PAIN DESCRIPTION - DESCRIPTORS
DESCRIPTORS: DISCOMFORT
DESCRIPTORS: ACHING

## 2024-12-15 ASSESSMENT — PAIN SCALES - GENERAL
PAINLEVEL_OUTOF10: 3
PAINLEVEL_OUTOF10: 4
PAINLEVEL_OUTOF10: 3
PAINLEVEL_OUTOF10: 7
PAINLEVEL_OUTOF10: 6
PAINLEVEL_OUTOF10: 7

## 2024-12-15 ASSESSMENT — PAIN - FUNCTIONAL ASSESSMENT
PAIN_FUNCTIONAL_ASSESSMENT: 0-10

## 2024-12-15 ASSESSMENT — COGNITIVE AND FUNCTIONAL STATUS - GENERAL
CLIMB 3 TO 5 STEPS WITH RAILING: A LITTLE
MOBILITY SCORE: 23
DAILY ACTIVITIY SCORE: 24
MOBILITY SCORE: 23
CLIMB 3 TO 5 STEPS WITH RAILING: A LITTLE
DAILY ACTIVITIY SCORE: 24

## 2024-12-15 NOTE — CARE PLAN
The patient's goals for the shift include sleep and control cough    The clinical goals for the shift include safety, monitor 02      Problem: Pain - Adult  Goal: Verbalizes/displays adequate comfort level or baseline comfort level  Outcome: Progressing     Problem: Safety - Adult  Goal: Free from fall injury  Outcome: Progressing     Problem: Discharge Planning  Goal: Discharge to home or other facility with appropriate resources  Outcome: Progressing     Problem: Chronic Conditions and Co-morbidities  Goal: Patient's chronic conditions and co-morbidity symptoms are monitored and maintained or improved  Outcome: Progressing     Problem: Respiratory  Goal: Clear secretions with interventions this shift  Outcome: Progressing  Goal: Minimize anxiety/maximize coping throughout shift  Outcome: Progressing  Goal: Minimal/no exertional discomfort or dyspnea this shift  Outcome: Progressing  Goal: No signs of respiratory distress (eg. Use of accessory muscles. Peds grunting)  Outcome: Progressing  Goal: Patent airway maintained this shift  Outcome: Progressing  Goal: Tolerate pulmonary toileting this shift  Outcome: Progressing  Goal: Verbalize decreased shortness of breath this shift  Outcome: Progressing  Goal: Wean oxygen to maintain O2 saturation per order/standard this shift  Outcome: Progressing  Goal: Increase self care and/or family involvement in next 24 hours  Outcome: Progressing     Problem: Fall/Injury  Goal: Not fall by end of shift  Outcome: Progressing  Goal: Be free from injury by end of the shift  Outcome: Progressing  Goal: Verbalize understanding of personal risk factors for fall in the hospital  Outcome: Progressing  Goal: Verbalize understanding of risk factor reduction measures to prevent injury from fall in the home  Outcome: Progressing  Goal: Use assistive devices by end of the shift  Outcome: Progressing  Goal: Pace activities to prevent fatigue by end of the shift  Outcome: Progressing      Problem: Diabetes  Goal: Achieve decreasing blood glucose levels by end of shift  Outcome: Progressing  Goal: Increase stability of blood glucose readings by end of shift  Outcome: Progressing  Goal: Decrease in ketones present in urine by end of shift  Outcome: Progressing  Goal: Maintain electrolyte levels within acceptable range throughout shift  Outcome: Progressing  Goal: Maintain glucose levels >70mg/dl to <250mg/dl throughout shift  Outcome: Progressing  Goal: No changes in neurological exam by end of shift  Outcome: Progressing  Goal: Learn about and adhere to nutrition recommendations by end of shift  Outcome: Progressing  Goal: Vital signs within normal range for age by end of shift  Outcome: Progressing  Goal: Increase self care and/or family involovement by end of shift  Outcome: Progressing  Goal: Receive DSME education by end of shift  Outcome: Progressing

## 2024-12-15 NOTE — NURSING NOTE
Went to have a alk with the patient. She was given some education on her diet. She is on a regular diet, but has ordered thing that will make her blood sugars run high.

## 2024-12-15 NOTE — PROGRESS NOTES
Alanna Hickman is a 44 y.o. female on day 7 of admission presenting with Bacterial pneumonia.      Subjective   Patient states she is feeling better.  We discussed blood sugars being elevated and she told me that she does take 1500 mg of metformin daily.  Last hemoglobin A1c was 7.8 the other day.  Generally managed well at home.         Objective     Last Recorded Vitals  /90 (BP Location: Left arm, Patient Position: Sitting)   Pulse 72   Temp 36.5 °C (97.7 °F) (Oral)   Resp 18   Wt 107 kg (235 lb)   SpO2 92%   Intake/Output last 3 Shifts:    Intake/Output Summary (Last 24 hours) at 12/15/2024 1532  Last data filed at 12/15/2024 1231  Gross per 24 hour   Intake 1940 ml   Output 4850 ml   Net -2910 ml       Admission Weight  Weight: 107 kg (235 lb) (12/08/24 1805)    Daily Weight  12/08/24 : 107 kg (235 lb)    Image Results  CT chest wo IV contrast  Narrative: Interpreted By:  Soham Connell,   STUDY:  CT CHEST WO IV CONTRAST; 12/12/2024 2:29 pm      INDICATION:  Signs/Symptoms:pneumonia. Shortness of breath.      COMPARISON:  12/08/2024      ACCESSION NUMBER(S):  [id number]      ORDERING CLINICIAN:  DIANA BRYAN      TECHNIQUE:  The study was performed without intravenous contrast material as  requested. A helical acquisition data was obtained with multiplanar  reconstructions.      One or more of the following dose reduction techniques were used:  Automated exposure control Adjustment of the mA and/or kV according  to patient size, and/or use of iterative reconstruction technique.      FINDINGS:  Within the limits of this unenhanced study no hilar or mediastinal  lymphadenopathy is identified. *Exam is somewhat limited by motion  artifact. *There is worsening bibasilar tree-in-bud nodular  infiltrate. *There is new large patchy ground-glass infiltrate right  lower lobe 4.3 cm in diameter (Series 8, Image 208) *There are new  patches of ground-glass infiltrate within both upper lobes, most  marked  on the right where there are large patches of ground-glass  infiltrate superiorly. There is also new ground-glass infiltrate  within the superior segment left lower lobe. There is also increasing  new right middle lobe tree-in-bud nodular infiltrate. *No effusions  are identified.          Chest Wall: No chest wall masses are identified.      Skeletal System: No fractures or destructive lesions are identified.      Upper Abdomen: There are focal areas of diminished attenuation within  the medial segment left lobe, somewhat elongated tubular in  configuration along with a more focal area medially within the  posterior aspect lateral segment left lobe demonstrating fat  attenuation characteristics. Etiology for these findings is unclear  and they appear stable compared to 12/08/2024. Correlation with LFTs  is recommended. Correlation with contrast-enhanced CT of the abdomen  may also be useful.      Impression: 1. Notable worsening of the infiltrates bilaterally as described  above with worsening tree-in-bud nodular infiltrates along with  multiple new patches of ground-glass infiltrate suggesting infectious  etiology.  2. No alveolar consolidation is noted. No effusions are identified.  3. Stable but somewhat unusual appearance within the liver as  described above for which contrast-enhanced CT is recommended if and  when the patient can tolerate. Correlation with LFTs is also  recommended.      MACRO:  none      Signed by: Soham Connell 12/12/2024 4:26 PM  Dictation workstation:   [id number]      Physical Exam  Generally no acute distress  HEENT PERRL EOMI  Cardiovascular S1-S2 regular rate rhythm  Lungs vesicular breath sounds noted slightly diminished no significant wheezes  Abdomen bowel sounds present nontender  Extremities no clubbing cyanosis edema    Relevant Results  Scheduled medications  acetylcysteine, 3 mL, nebulization, BID  azithromycin, 500 mg, oral, q24h MARY  benzonatate, 100 mg, oral,  TID  clopidogrel, 75 mg, oral, Daily  dapagliflozin propanediol, 10 mg, oral, Daily  enoxaparin, 40 mg, subcutaneous, q24h  insulin glargine, 40 Units, subcutaneous, Nightly  insulin lispro, 0-10 Units, subcutaneous, TID AC  ipratropium-albuteroL, 3 mL, nebulization, 4x daily  metFORMIN, 1,500 mg, oral, Daily  nicotine, 1 patch, transdermal, q24h MARY  oxygen, , inhalation, Continuous - Inhalation  pantoprazole, 20 mg, oral, Daily before breakfast  perflutren protein A microsphere, 0.5 mL, intravenous, Once in imaging  piperacillin-tazobactam, 3.375 g, intravenous, q6h  polyethylene glycol, 17 g, oral, BID  predniSONE, 40 mg, oral, BID  sulfur hexafluoride microsphr, 2 mL, intravenous, Once in imaging      Continuous medications     PRN medications  PRN medications: acetaminophen **OR** acetaminophen **OR** acetaminophen, benzocaine-menthol, dextromethorphan-guaifenesin, hydrocodone-homatropine, hydrOXYzine HCL, ipratropium-albuteroL, melatonin, ondansetron ODT **OR** ondansetron, oxyCODONE, temazepam  Results for orders placed or performed during the hospital encounter of 12/08/24 (from the past 24 hours)   POCT GLUCOSE   Result Value Ref Range    POCT Glucose 270 (H) 74 - 99 mg/dL   POCT GLUCOSE   Result Value Ref Range    POCT Glucose 426 (H) 74 - 99 mg/dL   POCT GLUCOSE   Result Value Ref Range    POCT Glucose 355 (H) 74 - 99 mg/dL   Comprehensive Metabolic Panel   Result Value Ref Range    Glucose 387 (H) 74 - 99 mg/dL    Sodium 130 (L) 136 - 145 mmol/L    Potassium 4.2 3.5 - 5.3 mmol/L    Chloride 96 (L) 98 - 107 mmol/L    Bicarbonate 27 21 - 32 mmol/L    Anion Gap 11 10 - 20 mmol/L    Urea Nitrogen 14 6 - 23 mg/dL    Creatinine 0.54 0.50 - 1.05 mg/dL    eGFR >90 >60 mL/min/1.73m*2    Calcium 9.4 8.6 - 10.3 mg/dL    Albumin 3.5 3.4 - 5.0 g/dL    Alkaline Phosphatase 75 33 - 110 U/L    Total Protein 7.1 6.4 - 8.2 g/dL    AST 16 9 - 39 U/L    Bilirubin, Total 0.4 0.0 - 1.2 mg/dL    ALT 34 7 - 45 U/L   Lavender  Top   Result Value Ref Range    Extra Tube Hold for add-ons.    POCT GLUCOSE   Result Value Ref Range    POCT Glucose 544 (H) 74 - 99 mg/dL   POCT GLUCOSE   Result Value Ref Range    POCT Glucose 512 (H) 74 - 99 mg/dL     Impression: 44-year-old woman admitted for hypoxic respiratory failure.    Plan:    1.  Hypoxic respiratory failure  -Wean high flow as tolerated  -Patient positive IgM mycoplasma, on azithromycin, patient with typical CT findings of mycoplasma  -Pulmonary input will continue with the recommendations in addition to antibiotics currently on  -Hopeful to wean prednisone starting tomorrow    2.  Type 2 diabetes  -Currently uncontrolled but overall controlled, hemoglobin A1c was 7.8 on this admission which indicates decent control at home.  Will add back patient's 1500 mg of metformin daily.  Will increase Lantus to 40 and I did give an additional 5 units of regular insulin this morning.  This is to cover her blood sugars which are elevated due to multiple reasons at this time including steroids.  Will need to continue to monitor and taper down insulin requirements as indicated.  Patient is on home Trulicity.    3.  Abnormal edging of liver  -This was seen on CT of chest, will need to get a dedicated liver CT scan once patient is off high flow           Assessment/Plan                                Jose A Ziegler MD

## 2024-12-15 NOTE — CARE PLAN
Arrived to room and pulse ox was alarming when I came in her NC was off and she was laying her SpO2 was 95%. Once placed back on NC she was 90%

## 2024-12-15 NOTE — NURSING NOTE
Patients lunch time blood sugar was above 500. She will be given 10 units of humalog once her tray arrives.

## 2024-12-15 NOTE — NURSING NOTE
Patients blood sugar was above 500. Order states to give 10units. I also talked to Dr. Ziegler. New orders to follow.

## 2024-12-15 NOTE — PROGRESS NOTES
"Pulmonary Daily Progress Note   Subjective    Alanna Hickman is a 44 y.o. year old female patient known with asthma, CVA on plavix, DM, smoker, possible spondyloarthropathy, severe SANGEETA, prescribed PAP therapy but had trouble with machine, admitted on 12/8/2024 with following acute hypoxic respiratory failure due to pneumonia. Pulmonary are consulted for the for respiratory failure and pneumonia.    Interval History:  Remains on high flow  States she is very tired this AM    Meds    Scheduled medications  acetylcysteine, 3 mL, nebulization, BID  azithromycin, 500 mg, oral, q24h MARY  benzonatate, 100 mg, oral, TID  clopidogrel, 75 mg, oral, Daily  dapagliflozin propanediol, 10 mg, oral, Daily  enoxaparin, 40 mg, subcutaneous, q24h  insulin glargine, 35 Units, subcutaneous, Nightly  insulin lispro, 0-10 Units, subcutaneous, TID AC  ipratropium-albuteroL, 3 mL, nebulization, 4x daily  nicotine, 1 patch, transdermal, q24h MARY  oxygen, , inhalation, Continuous - Inhalation  pantoprazole, 20 mg, oral, Daily before breakfast  perflutren protein A microsphere, 0.5 mL, intravenous, Once in imaging  piperacillin-tazobactam, 3.375 g, intravenous, q6h  polyethylene glycol, 17 g, oral, BID  predniSONE, 40 mg, oral, BID  sulfur hexafluoride microsphr, 2 mL, intravenous, Once in imaging      Continuous medications     PRN medications  PRN medications: acetaminophen **OR** acetaminophen **OR** acetaminophen, benzocaine-menthol, dextromethorphan-guaifenesin, hydrocodone-homatropine, hydrOXYzine HCL, ipratropium-albuteroL, melatonin, ondansetron ODT **OR** ondansetron, oxyCODONE, temazepam     Objective    Blood pressure 135/90, pulse 72, temperature 36.5 °C (97.7 °F), temperature source Oral, resp. rate 18, height 1.702 m (5' 7\"), weight 107 kg (235 lb), SpO2 96%.   Physical Exam   GENERAL: Mild tachypnea  HEAD/SINUSES: On nasal cannula 4-5 L  NECK: Large neck  LUNGS: Decreased air entry, some wheezing and rhonchi  CARDIAC: " Regular rate and rhythm  EXTREMITIES: No edema  NEURO: grossly normal mental status,   SKIN: Skin turgor normal.   PSYCH: Anxious    Intake/Output Summary (Last 24 hours) at 12/15/2024 0938  Last data filed at 12/15/2024 0637  Gross per 24 hour   Intake 2670 ml   Output 5150 ml   Net -2480 ml     Labs:   Results from last 72 hours   Lab Units 12/15/24  0421 12/14/24  0501 12/13/24  0534   SODIUM mmol/L 130* 134* 131*   POTASSIUM mmol/L 4.2 3.8 4.2   CHLORIDE mmol/L 96* 90* 92*   CO2 mmol/L 27 38* 34*   BUN mg/dL 14 14 12   CREATININE mg/dL 0.54 0.43* 0.48*   GLUCOSE mg/dL 387* 170* 282*   CALCIUM mg/dL 9.4 8.8 8.9   ANION GAP mmol/L 11 10 9*   EGFR mL/min/1.73m*2 >90 >90 >90      Results from last 72 hours   Lab Units 12/14/24  0501 12/13/24  0534   WBC AUTO x10*3/uL 8.8 8.5   HEMOGLOBIN g/dL 13.8 14.9   HEMATOCRIT % 41.5 45.2   PLATELETS AUTO x10*3/uL 256 262      Results from last 72 hours   Lab Units 12/12/24  1503 12/12/24  1215   POCT PH, ARTERIAL pH 7.42 7.42   POCT PCO2, ARTERIAL mm Hg 52* 56*   POCT PO2, ARTERIAL mm Hg 67* 67*   POCT SO2, ARTERIAL % 94 95          Micro/ID:   Lab Results   Component Value Date    URINECULTURE No significant growth 11/20/2023    BLOODCULT No growth at 4 days -  FINAL REPORT 12/08/2024    BLOODCULT No growth at 4 days -  FINAL REPORT 12/08/2024     Summary of key imaging results from the last 24 hours  CXR better on right     CT chest shows tree-in-bud appearance bilaterally and bronchial Dilation   TTE 2022  There is normal left ventricular systolic function.  Estimated ejection fraction is 60-64%.  No cardiac source of embolus identified.    Mycoplasma IgM positive      TTE 2024   1. Left ventricular ejection fraction is normal, by visual estimate at 60-65%.   2. Spectral Doppler shows a Grade I (impaired relaxation pattern) of left ventricular diastolic filling with normal left atrial filling pressure.   3. There is normal right ventricular global systolic  function.    Impression   Alanna Hickman is a 44 y.o. year old female patient known with asthma, CVA on plavix, DM, smoker, possible spondyloarthropathy, admitted on 12/8/2024 with following acute hypoxic respiratory failure due to pneumonia. Pulmonary are consulted for the for respiratory failure and pneumonia.  Acute hypoxic respiratory failure due to bronchopneumonia most probably infectious in nature, with history of dysphagia this could be also aspiration related however with viral prodrome before presentation favors infectious process viral versus atypical pneumonia versus bacterial.  She is not on chronic steroids however does use steroids intermittently for back pain.  History of asthma which does not seem very well-controlled, no PFTs on chart, on albuterol as needed at home. Recent exposure to parrots and she is worried that is what triggered her sx.   History of CVA and dysphagia  Smoker  History of asthma uncontrolled  SANGEETA uncontrolled, not treated as had trouble with her PAP machine   Mycoplasma pneumonia: IgM positive    Strep, legionella and MRSA normal   Pro calcitonin normal   SLP bedside no dysphagia evidence     Recommendations   As follows:  Recommend continuing antibiotics  Bronchopulmonary hygiene with Incentive spirometry  Monitor for hemoptysis  Prednisone -= increase dose over the weekend due to persistent wheezing. Consider taper to daily in AM  Based on imaging finding less likely clinically picture related to hypersensitivity pneumonitis or psittacosis and with IgM mycoplasma  .   CT chest reviewed  Slightly worse findings  Trial of diuresis over the weekend. Appears to be some slow clinical improvement. Will continue today    Will monitor over the weekend - may need bronch for biopsy or culture      Horace Hernandez MD   12/15/24 at 9:38 AM     -Only the Medical problems listed under impression were addressed today.   -Please contact primary team for all other concerns and medical  problem  -Thank you for your consult       Disclaimer: Documentation completed with the information available at the time of input. Parts of this note may have been scribed or generated using voice dictation software, Dragon.  Homophonic errors may exist.  Please contact me directly if clarification is needed. The times in the chart may not be reflective of actual patient care times, interventions, or procedures. Documentation occurs after the physical care of the patient.

## 2024-12-15 NOTE — NURSING NOTE
Assumed care of patient. Patient is sitting up at the side of the bed. She keeps taking her oxygen off. She was made aware that she has to have it on at all times. Call light is in place will continue to monitor.

## 2024-12-16 LAB
ALBUMIN SERPL BCP-MCNC: 3.4 G/DL (ref 3.4–5)
ALP SERPL-CCNC: 68 U/L (ref 33–110)
ALT SERPL W P-5'-P-CCNC: 42 U/L (ref 7–45)
ANION GAP SERPL CALCULATED.3IONS-SCNC: 12 MMOL/L (ref 10–20)
AST SERPL W P-5'-P-CCNC: 19 U/L (ref 9–39)
BILIRUB SERPL-MCNC: 0.3 MG/DL (ref 0–1.2)
BUN SERPL-MCNC: 21 MG/DL (ref 6–23)
CALCIUM SERPL-MCNC: 9.7 MG/DL (ref 8.6–10.3)
CHLORIDE SERPL-SCNC: 100 MMOL/L (ref 98–107)
CO2 SERPL-SCNC: 28 MMOL/L (ref 21–32)
CREAT SERPL-MCNC: 0.46 MG/DL (ref 0.5–1.05)
EGFRCR SERPLBLD CKD-EPI 2021: >90 ML/MIN/1.73M*2
GLUCOSE BLD MANUAL STRIP-MCNC: 202 MG/DL (ref 74–99)
GLUCOSE BLD MANUAL STRIP-MCNC: 206 MG/DL (ref 74–99)
GLUCOSE BLD MANUAL STRIP-MCNC: 230 MG/DL (ref 74–99)
GLUCOSE BLD MANUAL STRIP-MCNC: 333 MG/DL (ref 74–99)
GLUCOSE BLD MANUAL STRIP-MCNC: 339 MG/DL (ref 74–99)
GLUCOSE BLD MANUAL STRIP-MCNC: 503 MG/DL (ref 74–99)
GLUCOSE SERPL-MCNC: 229 MG/DL (ref 74–99)
HOLD SPECIMEN: NORMAL
POTASSIUM SERPL-SCNC: 4.1 MMOL/L (ref 3.5–5.3)
PROT SERPL-MCNC: 6.6 G/DL (ref 6.4–8.2)
SODIUM SERPL-SCNC: 136 MMOL/L (ref 136–145)

## 2024-12-16 PROCEDURE — 2500000004 HC RX 250 GENERAL PHARMACY W/ HCPCS (ALT 636 FOR OP/ED): Performed by: INTERNAL MEDICINE

## 2024-12-16 PROCEDURE — 94668 MNPJ CHEST WALL SBSQ: CPT

## 2024-12-16 PROCEDURE — 99232 SBSQ HOSP IP/OBS MODERATE 35: CPT | Performed by: HOSPITALIST

## 2024-12-16 PROCEDURE — 2500000002 HC RX 250 W HCPCS SELF ADMINISTERED DRUGS (ALT 637 FOR MEDICARE OP, ALT 636 FOR OP/ED): Performed by: INTERNAL MEDICINE

## 2024-12-16 PROCEDURE — 94664 DEMO&/EVAL PT USE INHALER: CPT

## 2024-12-16 PROCEDURE — 94640 AIRWAY INHALATION TREATMENT: CPT

## 2024-12-16 PROCEDURE — 2500000001 HC RX 250 WO HCPCS SELF ADMINISTERED DRUGS (ALT 637 FOR MEDICARE OP): Performed by: HOSPITALIST

## 2024-12-16 PROCEDURE — 9420000001 HC RT PATIENT EDUCATION 5 MIN

## 2024-12-16 PROCEDURE — 97110 THERAPEUTIC EXERCISES: CPT | Mod: GO

## 2024-12-16 PROCEDURE — 82947 ASSAY GLUCOSE BLOOD QUANT: CPT

## 2024-12-16 PROCEDURE — 99232 SBSQ HOSP IP/OBS MODERATE 35: CPT | Performed by: INTERNAL MEDICINE

## 2024-12-16 PROCEDURE — 2500000004 HC RX 250 GENERAL PHARMACY W/ HCPCS (ALT 636 FOR OP/ED): Performed by: STUDENT IN AN ORGANIZED HEALTH CARE EDUCATION/TRAINING PROGRAM

## 2024-12-16 PROCEDURE — 2500000002 HC RX 250 W HCPCS SELF ADMINISTERED DRUGS (ALT 637 FOR MEDICARE OP, ALT 636 FOR OP/ED): Performed by: HOSPITALIST

## 2024-12-16 PROCEDURE — 94660 CPAP INITIATION&MGMT: CPT

## 2024-12-16 PROCEDURE — 2500000005 HC RX 250 GENERAL PHARMACY W/O HCPCS: Performed by: INTERNAL MEDICINE

## 2024-12-16 PROCEDURE — 94762 N-INVAS EAR/PLS OXIMTRY CONT: CPT

## 2024-12-16 PROCEDURE — 2500000001 HC RX 250 WO HCPCS SELF ADMINISTERED DRUGS (ALT 637 FOR MEDICARE OP): Performed by: INTERNAL MEDICINE

## 2024-12-16 PROCEDURE — 2500000002 HC RX 250 W HCPCS SELF ADMINISTERED DRUGS (ALT 637 FOR MEDICARE OP, ALT 636 FOR OP/ED): Performed by: REGISTERED NURSE

## 2024-12-16 PROCEDURE — 97535 SELF CARE MNGMENT TRAINING: CPT | Mod: GO

## 2024-12-16 PROCEDURE — 1200000002 HC GENERAL ROOM WITH TELEMETRY DAILY

## 2024-12-16 PROCEDURE — 36415 COLL VENOUS BLD VENIPUNCTURE: CPT | Performed by: HOSPITALIST

## 2024-12-16 PROCEDURE — S4991 NICOTINE PATCH NONLEGEND: HCPCS | Performed by: INTERNAL MEDICINE

## 2024-12-16 PROCEDURE — 2500000001 HC RX 250 WO HCPCS SELF ADMINISTERED DRUGS (ALT 637 FOR MEDICARE OP): Performed by: REGISTERED NURSE

## 2024-12-16 PROCEDURE — 80053 COMPREHEN METABOLIC PANEL: CPT | Performed by: HOSPITALIST

## 2024-12-16 PROCEDURE — 97110 THERAPEUTIC EXERCISES: CPT | Mod: GP | Performed by: PHYSICAL THERAPIST

## 2024-12-16 PROCEDURE — 97530 THERAPEUTIC ACTIVITIES: CPT | Mod: GP | Performed by: PHYSICAL THERAPIST

## 2024-12-16 RX ORDER — PREDNISONE 20 MG/1
40 TABLET ORAL DAILY
Status: DISCONTINUED | OUTPATIENT
Start: 2024-12-17 | End: 2024-12-17 | Stop reason: HOSPADM

## 2024-12-16 RX ORDER — INSULIN LISPRO 100 [IU]/ML
12 INJECTION, SOLUTION INTRAVENOUS; SUBCUTANEOUS ONCE
Status: COMPLETED | OUTPATIENT
Start: 2024-12-16 | End: 2024-12-16

## 2024-12-16 ASSESSMENT — PAIN - FUNCTIONAL ASSESSMENT
PAIN_FUNCTIONAL_ASSESSMENT: 0-10

## 2024-12-16 ASSESSMENT — COGNITIVE AND FUNCTIONAL STATUS - GENERAL
DAILY ACTIVITIY SCORE: 24
DAILY ACTIVITIY SCORE: 19
MOBILITY SCORE: 20
CLIMB 3 TO 5 STEPS WITH RAILING: A LOT
DAILY ACTIVITIY SCORE: 24
DRESSING REGULAR LOWER BODY CLOTHING: A LITTLE
DRESSING REGULAR UPPER BODY CLOTHING: A LITTLE
HELP NEEDED FOR BATHING: A LOT
WALKING IN HOSPITAL ROOM: A LITTLE
MOBILITY SCORE: 24
TOILETING: A LITTLE
MOVING TO AND FROM BED TO CHAIR: A LITTLE
MOBILITY SCORE: 24

## 2024-12-16 ASSESSMENT — PAIN SCALES - GENERAL
PAINLEVEL_OUTOF10: 0 - NO PAIN
PAINLEVEL_OUTOF10: 5 - MODERATE PAIN
PAINLEVEL_OUTOF10: 2
PAINLEVEL_OUTOF10: 0 - NO PAIN
PAINLEVEL_OUTOF10: 5 - MODERATE PAIN
PAINLEVEL_OUTOF10: 0 - NO PAIN
PAINLEVEL_OUTOF10: 7
PAINLEVEL_OUTOF10: 0 - NO PAIN
PAINLEVEL_OUTOF10: 8
PAINLEVEL_OUTOF10: 7

## 2024-12-16 ASSESSMENT — PAIN DESCRIPTION - DESCRIPTORS
DESCRIPTORS: ACHING
DESCRIPTORS: ACHING
DESCRIPTORS: ACHING;DISCOMFORT

## 2024-12-16 ASSESSMENT — PAIN DESCRIPTION - LOCATION: LOCATION: RIB CAGE

## 2024-12-16 ASSESSMENT — ACTIVITIES OF DAILY LIVING (ADL): HOME_MANAGEMENT_TIME_ENTRY: 15

## 2024-12-16 NOTE — NURSING NOTE
Assumed care of pt. Pt resting quietly in bed. Respirations even and unlabored on high flow NC. BSSR compete. Care ongoing,

## 2024-12-16 NOTE — CARE PLAN
The patient's goals for the shift include sleep and control cough    The clinical goals for the shift include pain control, blood sugar control    Over the shift, the patient did not make progress toward the following goals. Barriers to progression include na. Recommendations to address these barriers include na.    Problem: Pain - Adult  Goal: Verbalizes/displays adequate comfort level or baseline comfort level  Outcome: Progressing     Problem: Safety - Adult  Goal: Free from fall injury  Outcome: Progressing     Problem: Discharge Planning  Goal: Discharge to home or other facility with appropriate resources  Outcome: Progressing     Problem: Chronic Conditions and Co-morbidities  Goal: Patient's chronic conditions and co-morbidity symptoms are monitored and maintained or improved  Outcome: Progressing     Problem: Respiratory  Goal: Clear secretions with interventions this shift  Outcome: Progressing  Goal: Minimize anxiety/maximize coping throughout shift  Outcome: Progressing  Goal: Minimal/no exertional discomfort or dyspnea this shift  Outcome: Progressing  Goal: No signs of respiratory distress (eg. Use of accessory muscles. Peds grunting)  Outcome: Progressing  Goal: Patent airway maintained this shift  Outcome: Progressing  Goal: Tolerate pulmonary toileting this shift  Outcome: Progressing  Goal: Verbalize decreased shortness of breath this shift  Outcome: Progressing  Goal: Wean oxygen to maintain O2 saturation per order/standard this shift  Outcome: Progressing  Goal: Increase self care and/or family involvement in next 24 hours  Outcome: Progressing     Problem: Fall/Injury  Goal: Not fall by end of shift  Outcome: Progressing  Goal: Be free from injury by end of the shift  Outcome: Progressing  Goal: Verbalize understanding of personal risk factors for fall in the hospital  Outcome: Progressing  Goal: Verbalize understanding of risk factor reduction measures to prevent injury from fall in the  home  Outcome: Progressing  Goal: Use assistive devices by end of the shift  Outcome: Progressing  Goal: Pace activities to prevent fatigue by end of the shift  Outcome: Progressing     Problem: Diabetes  Goal: Achieve decreasing blood glucose levels by end of shift  Outcome: Progressing  Goal: Increase stability of blood glucose readings by end of shift  Outcome: Progressing  Goal: Decrease in ketones present in urine by end of shift  Outcome: Progressing  Goal: Maintain electrolyte levels within acceptable range throughout shift  Outcome: Progressing  Goal: Maintain glucose levels >70mg/dl to <250mg/dl throughout shift  Outcome: Progressing  Goal: No changes in neurological exam by end of shift  Outcome: Progressing  Goal: Learn about and adhere to nutrition recommendations by end of shift  Outcome: Progressing  Goal: Vital signs within normal range for age by end of shift  Outcome: Progressing  Goal: Increase self care and/or family involovement by end of shift  Outcome: Progressing  Goal: Receive DSME education by end of shift  Outcome: Progressing

## 2024-12-16 NOTE — CARE PLAN
Problem: Respiratory  Goal: Clear secretions with interventions this shift  Outcome: Progressing  Goal: No signs of respiratory distress (eg. Use of accessory muscles. Peds grunting)  Outcome: Progressing  Goal: Wean oxygen to maintain O2 saturation per order/standard this shift  Outcome: Progressing

## 2024-12-16 NOTE — PROGRESS NOTES
"Pulmonary Daily Progress Note   Subjective    Alanna Hickman is a 44 y.o. year old female patient known with asthma, CVA on plavix, DM, smoker, possible spondyloarthropathy, severe SANGEETA, prescribed PAP therapy but had trouble with machine, admitted on 12/8/2024 with following acute hypoxic respiratory failure due to pneumonia. Pulmonary are consulted for the for respiratory failure and pneumonia.    Interval History:  Weaned off HFNC overnight  Overall feels better compared to when she first presented    Meds    Scheduled medications  acetylcysteine, 3 mL, nebulization, BID  azithromycin, 500 mg, oral, q24h MARY  benzonatate, 100 mg, oral, TID  clopidogrel, 75 mg, oral, Daily  dapagliflozin propanediol, 10 mg, oral, Daily  enoxaparin, 40 mg, subcutaneous, q24h  insulin glargine, 40 Units, subcutaneous, Nightly  insulin lispro, 0-10 Units, subcutaneous, TID AC  ipratropium-albuteroL, 3 mL, nebulization, 4x daily  metFORMIN, 1,500 mg, oral, Daily  nicotine, 1 patch, transdermal, q24h MARY  oxygen, , inhalation, q4h  pantoprazole, 20 mg, oral, Daily before breakfast  perflutren protein A microsphere, 0.5 mL, intravenous, Once in imaging  piperacillin-tazobactam, 3.375 g, intravenous, q6h  polyethylene glycol, 17 g, oral, BID  predniSONE, 40 mg, oral, BID  sulfur hexafluoride microsphr, 2 mL, intravenous, Once in imaging      Continuous medications     PRN medications  PRN medications: acetaminophen **OR** acetaminophen **OR** acetaminophen, benzocaine-menthol, dextromethorphan-guaifenesin, hydrocodone-homatropine, hydrOXYzine HCL, ipratropium-albuteroL, melatonin, ondansetron ODT **OR** ondansetron, oxyCODONE, temazepam     Objective    Blood pressure 125/80, pulse 72, temperature 36.7 °C (98.1 °F), temperature source Oral, resp. rate 18, height 1.702 m (5' 7\"), weight 107 kg (235 lb), SpO2 100%.   Physical Exam   GENERAL: Mild tachypnea  HEAD/SINUSES: MMM  NECK: Large neck  LUNGS: Decreased air entry, some wheezing " and rhonchi  CARDIAC: Regular rate and rhythm  EXTREMITIES: No edema  NEURO: grossly normal mental status,   SKIN: Skin turgor normal.   PSYCH: Anxious    Intake/Output Summary (Last 24 hours) at 12/16/2024 1029  Last data filed at 12/16/2024 0952  Gross per 24 hour   Intake 1090 ml   Output 1150 ml   Net -60 ml     Labs:   Results from last 72 hours   Lab Units 12/16/24  0509 12/15/24  0421 12/14/24  0501   SODIUM mmol/L 136 130* 134*   POTASSIUM mmol/L 4.1 4.2 3.8   CHLORIDE mmol/L 100 96* 90*   CO2 mmol/L 28 27 38*   BUN mg/dL 21 14 14   CREATININE mg/dL 0.46* 0.54 0.43*   GLUCOSE mg/dL 229* 387* 170*   CALCIUM mg/dL 9.7 9.4 8.8   ANION GAP mmol/L 12 11 10   EGFR mL/min/1.73m*2 >90 >90 >90      Results from last 72 hours   Lab Units 12/14/24  0501   WBC AUTO x10*3/uL 8.8   HEMOGLOBIN g/dL 13.8   HEMATOCRIT % 41.5   PLATELETS AUTO x10*3/uL 256                 Micro/ID:   Lab Results   Component Value Date    URINECULTURE No significant growth 11/20/2023    BLOODCULT No growth at 4 days -  FINAL REPORT 12/08/2024    BLOODCULT No growth at 4 days -  FINAL REPORT 12/08/2024     Summary of key imaging results from the last 24 hours  CXR better on right     CT chest shows tree-in-bud appearance bilaterally and bronchial Dilation   TTE 2022  There is normal left ventricular systolic function.  Estimated ejection fraction is 60-64%.  No cardiac source of embolus identified.    Mycoplasma IgM positive      TTE 2024   1. Left ventricular ejection fraction is normal, by visual estimate at 60-65%.   2. Spectral Doppler shows a Grade I (impaired relaxation pattern) of left ventricular diastolic filling with normal left atrial filling pressure.   3. There is normal right ventricular global systolic function.    Kilo Hickman is a 44 y.o. year old female patient known with asthma, CVA on plavix, DM, smoker, possible spondyloarthropathy, admitted on 12/8/2024 with following acute hypoxic respiratory failure due  to pneumonia. Pulmonary are consulted for the for respiratory failure and pneumonia.  Acute hypoxic respiratory failure due to bronchopneumonia most probably infectious in nature, with history of dysphagia this could be also aspiration related however with viral prodrome before presentation favors infectious process viral versus atypical pneumonia versus bacterial.  She is not on chronic steroids however does use steroids intermittently for back pain.  History of asthma which does not seem very well-controlled, no PFTs on chart, on albuterol as needed at home. Recent exposure to parrots and she is worried that is what triggered her sx.   History of CVA and dysphagia  Smoker  History of asthma uncontrolled  SANGEETA uncontrolled, not treated as had trouble with her PAP machine   Mycoplasma pneumonia: IgM positive    Recommendations   As follows:  Will discontinue antibiotics today given she has received 7 days of Zosyn and 5 days of azithromycin  Taper prednisone to 40 mg daily  Wean NC as tolerated for goal SpO2 >92%  If difficulty weaning down/off oxygen, will consider repeat imaging and consider bronchoscopy  Discussed with patient, she absolutely needs outpatient pulmonary follow-up once discharged  Pulmonary consults will follow      Andrade Aguirre MD   12/16/24 at 10:29 AM     -Only the Medical problems listed under impression were addressed today.   -Please contact primary team for all other concerns and medical problem  -Thank you for your consult       Disclaimer: Documentation completed with the information available at the time of input. Parts of this note may have been scribed or generated using voice dictation software, Dragon.  Homophonic errors may exist.  Please contact me directly if clarification is needed. The times in the chart may not be reflective of actual patient care times, interventions, or procedures. Documentation occurs after the physical care of the patient.

## 2024-12-16 NOTE — PROGRESS NOTES
Alanna Hickman is a 44 y.o. female on day 8 of admission presenting with Bacterial pneumonia.      Subjective       Patient again continues to feel well overall.  I am in the room, she is saturating upper 90s 100% on current high flow nasal cannula.         Objective     Last Recorded Vitals  /80 (BP Location: Right arm, Patient Position: Lying)   Pulse 72   Temp 36.7 °C (98.1 °F) (Oral)   Resp 18   Wt 107 kg (235 lb)   SpO2 100%   Intake/Output last 3 Shifts:    Intake/Output Summary (Last 24 hours) at 12/16/2024 1134  Last data filed at 12/16/2024 0952  Gross per 24 hour   Intake 1090 ml   Output 1150 ml   Net -60 ml       Admission Weight  Weight: 107 kg (235 lb) (12/08/24 1805)    Daily Weight  12/08/24 : 107 kg (235 lb)    Image Results  CT chest wo IV contrast  Narrative: Interpreted By:  Soham Connell,   STUDY:  CT CHEST WO IV CONTRAST; 12/12/2024 2:29 pm      INDICATION:  Signs/Symptoms:pneumonia. Shortness of breath.      COMPARISON:  12/08/2024      ACCESSION NUMBER(S):  [id number]      ORDERING CLINICIAN:  DIANA BRYAN      TECHNIQUE:  The study was performed without intravenous contrast material as  requested. A helical acquisition data was obtained with multiplanar  reconstructions.      One or more of the following dose reduction techniques were used:  Automated exposure control Adjustment of the mA and/or kV according  to patient size, and/or use of iterative reconstruction technique.      FINDINGS:  Within the limits of this unenhanced study no hilar or mediastinal  lymphadenopathy is identified. *Exam is somewhat limited by motion  artifact. *There is worsening bibasilar tree-in-bud nodular  infiltrate. *There is new large patchy ground-glass infiltrate right  lower lobe 4.3 cm in diameter (Series 8, Image 208) *There are new  patches of ground-glass infiltrate within both upper lobes, most  marked on the right where there are large patches of ground-glass  infiltrate superiorly.  There is also new ground-glass infiltrate  within the superior segment left lower lobe. There is also increasing  new right middle lobe tree-in-bud nodular infiltrate. *No effusions  are identified.          Chest Wall: No chest wall masses are identified.      Skeletal System: No fractures or destructive lesions are identified.      Upper Abdomen: There are focal areas of diminished attenuation within  the medial segment left lobe, somewhat elongated tubular in  configuration along with a more focal area medially within the  posterior aspect lateral segment left lobe demonstrating fat  attenuation characteristics. Etiology for these findings is unclear  and they appear stable compared to 12/08/2024. Correlation with LFTs  is recommended. Correlation with contrast-enhanced CT of the abdomen  may also be useful.      Impression: 1. Notable worsening of the infiltrates bilaterally as described  above with worsening tree-in-bud nodular infiltrates along with  multiple new patches of ground-glass infiltrate suggesting infectious  etiology.  2. No alveolar consolidation is noted. No effusions are identified.  3. Stable but somewhat unusual appearance within the liver as  described above for which contrast-enhanced CT is recommended if and  when the patient can tolerate. Correlation with LFTs is also  recommended.      MACRO:  none      Signed by: Soham Connell 12/12/2024 4:26 PM  Dictation workstation:   [id number]      Physical Exam  Constitutional:       Appearance: Normal appearance.   HENT:      Right Ear: External ear normal.      Left Ear: External ear normal.   Eyes:      Conjunctiva/sclera: Conjunctivae normal.   Cardiovascular:      Rate and Rhythm: Tachycardia present.   Pulmonary:      Comments: Mini high flow oxygen.  Bilateral rhonchi.  No current distress.  Saturating 100% at points.  Skin:     General: Skin is warm.   Neurological:      General: No focal deficit present.   Psychiatric:         Mood and  Affect: Mood normal.       Generally no acute distress  HEENT PERRL EOMI  Cardiovascular S1-S2 regular rate rhythm  Lungs vesicular breath sounds noted slightly diminished no significant wheezes  Abdomen bowel sounds present nontender  Extremities no clubbing cyanosis edema    Relevant Results  Scheduled medications  acetylcysteine, 3 mL, nebulization, BID  azithromycin, 500 mg, oral, q24h MARY  benzonatate, 100 mg, oral, TID  clopidogrel, 75 mg, oral, Daily  dapagliflozin propanediol, 10 mg, oral, Daily  enoxaparin, 40 mg, subcutaneous, q24h  insulin glargine, 40 Units, subcutaneous, Nightly  insulin lispro, 0-10 Units, subcutaneous, TID AC  ipratropium-albuteroL, 3 mL, nebulization, 4x daily  metFORMIN, 1,500 mg, oral, Daily  nicotine, 1 patch, transdermal, q24h MARY  oxygen, , inhalation, q4h  pantoprazole, 20 mg, oral, Daily before breakfast  perflutren protein A microsphere, 0.5 mL, intravenous, Once in imaging  piperacillin-tazobactam, 3.375 g, intravenous, q6h  polyethylene glycol, 17 g, oral, BID  predniSONE, 40 mg, oral, BID  sulfur hexafluoride microsphr, 2 mL, intravenous, Once in imaging      Continuous medications     PRN medications  PRN medications: acetaminophen **OR** acetaminophen **OR** acetaminophen, benzocaine-menthol, dextromethorphan-guaifenesin, hydrocodone-homatropine, hydrOXYzine HCL, ipratropium-albuteroL, melatonin, ondansetron ODT **OR** ondansetron, oxyCODONE, temazepam  Results for orders placed or performed during the hospital encounter of 12/08/24 (from the past 24 hours)   POCT GLUCOSE   Result Value Ref Range    POCT Glucose 512 (H) 74 - 99 mg/dL   POCT GLUCOSE   Result Value Ref Range    POCT Glucose 551 (H) 74 - 99 mg/dL   POCT GLUCOSE   Result Value Ref Range    POCT Glucose 519 (H) 74 - 99 mg/dL   POCT GLUCOSE   Result Value Ref Range    POCT Glucose 230 (H) 74 - 99 mg/dL   POCT GLUCOSE   Result Value Ref Range    POCT Glucose 202 (H) 74 - 99 mg/dL   Lavender Top   Result Value  Ref Range    Extra Tube Hold for add-ons.    Comprehensive Metabolic Panel   Result Value Ref Range    Glucose 229 (H) 74 - 99 mg/dL    Sodium 136 136 - 145 mmol/L    Potassium 4.1 3.5 - 5.3 mmol/L    Chloride 100 98 - 107 mmol/L    Bicarbonate 28 21 - 32 mmol/L    Anion Gap 12 10 - 20 mmol/L    Urea Nitrogen 21 6 - 23 mg/dL    Creatinine 0.46 (L) 0.50 - 1.05 mg/dL    eGFR >90 >60 mL/min/1.73m*2    Calcium 9.7 8.6 - 10.3 mg/dL    Albumin 3.4 3.4 - 5.0 g/dL    Alkaline Phosphatase 68 33 - 110 U/L    Total Protein 6.6 6.4 - 8.2 g/dL    AST 19 9 - 39 U/L    Bilirubin, Total 0.3 0.0 - 1.2 mg/dL    ALT 42 7 - 45 U/L   POCT GLUCOSE   Result Value Ref Range    POCT Glucose 206 (H) 74 - 99 mg/dL     Impression: 44-year-old woman admitted for hypoxic respiratory failure.    Plan:    1.  Hypoxic respiratory failure  -On high flow, but there appears to be some improvement and wean oxygen as able.  -High dose oral steroids, and defer tapering to pulmonology.      Pneumonia  -Mycoplasma IgM positive pointing towards this is causative infectious agent  -Continue current IV antibiotics including azithromycin  -Defer bronchoscopy to pulmonology    2.  Type 2 diabetes  -Continue sliding scale, anticipate transitioning to oral agents when she is at home per her usual.    3.  Abnormal edging of liver  -This was seen on CT of chest, will need to get a dedicated liver CT scan once patient is off high flow               Assessment/Plan                                Ty Tomlinson,

## 2024-12-16 NOTE — PROGRESS NOTES
LPCC met with pt at bedside, discussion around dc planning needs, declines both HHC and SNF at this time. Pt will return home with family at dc.      12/16/24 1342   Discharge Planning   Expected Discharge Disposition Home

## 2024-12-16 NOTE — NURSING NOTE
Pt desaturated to 82% after removing their 2L NC after RT had a conversation with pt about the importance of weaning them off so they can go home. This RN reapplied pts oxygen and pt only saturated to 87%. This RN increased NC to 4L and SpO2 increased to 92% and increasing. This RN called RT to ensure it is acceptable to increase oxygen needs, RT Nicolas on board 4L. Care ongoing.

## 2024-12-16 NOTE — PROGRESS NOTES
Physical Therapy    Physical Therapy Treatment    Patient Name: Alanna Hickman  MRN: [id number]  Department: [address]  Room: [address] [place] [address]  Today's Date: 12/16/2024  Time Calculation  Start Time: 1157  Stop Time: 1223  Time Calculation (min): 26 min         Assessment/Plan   PT Assessment  End of Session Communication: Bedside nurse  Assessment Comment: Pt made adequate progress toward her functional mobility goals. Pt required S evel assist during functional mobility compared to her reported baseline. Pt would benefit from continued skilled PT services for maximizing independence and safety prior to & after discharge (LOW intensity).  End of Session Patient Position: Alarm off, not on at start of session (seated EOB with call light & needs withinr reach. Alarm not activated as pt steady in dynamic stance and reproted using BSC (< 3 ft from EOB) independently. She denied  further ambulation distance within her room indep due to concern for tripping. Educat)     PT Plan  Treatment/Interventions: Bed mobility, Transfer training, Gait training, Balance training, Strengthening, Endurance training, Therapeutic activity  PT Plan: Ongoing PT  PT Frequency: 4 times per week  PT Discharge Recommendations: Moderate intensity level of continued care  Equipment Recommended upon Discharge:  (rollator)      General Visit Information:   PT  Visit  PT Received On: 12/16/24  General  Reason for Referral: impaired functional mobility; pt admitted for ARF (hypoxic) & bacterial pneumonia; found to have abnormal liver CT scan.  Past Medical History Relevant to Rehab: asthma, CVA on plavix, DM, tobacco smoker, DM with neuroapthy, RA, anklyosing spondylitis (HLA-B27 positve), severe SANGEETA, RA, fibromyalgia, hereditary thrombophilia  Family/Caregiver Present: No  Prior to Session Communication: Bedside nurse  Patient Position Received: Alarm off, not on at start of session (seated EOB)  General Comment: Cleared by RN for participation. Pt  "agreed to tx and was fully engaged throughout.    Subjective   Precautions:  Precautions  Medical Precautions: Fall precautions, Oxygen therapy device and L/min  Precautions Comment: 15 L/min O2 via NC. Telemetry with continuous oxygen saturation.    Vital Signs (Past 2hrs)        Date/Time Vitals Session Patient Position Pulse Resp SpO2 BP MAP (mmHg)    12/16/24 1157 --  Sitting  98  --  100 %  --  --                   Vital Signs Comment: On supplemental O2 in unsupported sitting. At rest: HR 98, SpO2 100%. After ambu: SpO2 98%. After standing therex: SpO2 98%. During ambuation SpO2 97-99%.     Objective   Pain:  Pain Assessment  0-10 (Numeric) Pain Score: 7  Pain Type: Acute pain  Pain Location: Rib cage  Pain Orientation: Right, Left  Pain Frequency: Constant/continuous (which she attributed to coughing \"for 3 weeks\")  Pain Interventions: Distraction (RN informed after tx)  Pain 2  Pain Score 2: 2  Pain Type 2: Chronic pain  Pain Location 2: Ankle  Pain Orientation 2: Left  Pain Onset 2:  (with weight bearing)  Pain Interventions 2: Rest (RN informed after tx)    Cognition:  Cognition  Overall Cognitive Status: Within Functional Limits (to participate, not formally assessed)       Postural Control:  Static Sitting Balance  Static Sitting-Balance Support: Feet supported (mod I)  Dynamic Sitting Balance  Dynamic Sitting-Balance Support: Feet supported (mod I for unobserved hygiene while at [facility])  Static Standing Balance  Static Standing-Balance Support: No upper extremity supported (distant S)  Dynamic Standing Balance  Dynamic Standing-Balance Support: No upper extremity supported  Dynamic Standing-Level of Assistance:  (close S)       Activity Tolerance:  Activity Tolerance  Endurance: Tolerates 10 - 20 min exercise with multiple rests (Pt on significant supplemental O2. Denied SOB but reported L ankle pain and concern for nausea (\" just ate my lunch\") as lmiting factors to standing " activities.)    Treatments:  Therapeutic Exercise  In stance with RUE support & close S, alternating BLE:   -hip/knee flex (march) x20   -hip abd x10   -knee flex.  In stance with BUE support & close S, BLE:   -PF x20      Therapeutic Activity  Educated pt in energy/oxygen conservation, including benefits of assistive device for ambulation. Pt reported she used her Rollator only for long distances at baseline.      Transfers  Transfer: Yes  Transfer 1  Technique 1: Sit to stand, Stand to sit  Transfer Device 1:  (BUE support on EOB or bilateral armrests at [facility])  Transfer Level of Assistance 1: Close supervision  Trials/Comments 1: x6 trials      Ambulation/Gait Training  Ambulation/Gait Training Performed: Yes  Ambulation/Gait Training 1  Surface 1: Level tile  Device 1: No device  Assistance 1: Close supervision (& PT managed O2 tank & SpO2 line for longest trial. Minimally cued for increased distance during final trial.)  Quality of Gait 1:  (wider FATOU, minimally increased lateral sway, slowed velocity, continuous movement)  Comments/Distance (ft) 1: </= 3 ft x2 (EOB<>BSC) & </=30 ft using step through pattern. No threat for LOB.      Outcome Measures:  [facility] Basic Mobility  Turning from your back to your side while in a flat bed without using bedrails: None  Moving from lying on your back to sitting on the side of a flat bed without using bedrails: None  Moving to and from bed to chair (including a wheelchair): A little  Standing up from a chair using your arms (e.g. wheelchair or bedside chair): None  To walk in hospital room: A little  Climbing 3-5 steps with railing: A lot  Basic Mobility - Total Score: 20    Education Documentation  Precautions, taught by Cathryn Suero PT at 12/16/2024  1:46 PM.  Learner: Patient  Readiness: Acceptance  Method: Explanation  Response: Verbalizes Understanding, Needs Reinforcement  Comment: Fall precautions    Mobility Training, taught by Cathryn Suero PT at 12/16/2024  1:46  PM.  Learner: Patient  Readiness: Acceptance  Method: Explanation  Response: Verbalizes Understanding, Needs Reinforcement  Comment: Fall precautions    Education Comments  No comments found.           Encounter Problems       Encounter Problems (Active)       PT Problem       STG - Pt will transition supine <> sitting independently (Progressing)       Start:  12/13/24    Expected End:  12/27/24            STG - Pt will transfer sit<>stand independently (Progressing)       Start:  12/13/24    Expected End:  12/27/24            STG - Pt will amb 150' using ww/rollator or short distances w/o ad with independently, O2 and rest breaks prn  (Progressing)       Start:  12/13/24    Expected End:  12/27/24            Pt. will demonstrate >=G dyn stand balance for decr. risk for falls (Progressing)       Start:  12/13/24    Expected End:  12/27/24               Pain - Adult

## 2024-12-17 ENCOUNTER — PHARMACY VISIT (OUTPATIENT)
Dept: PHARMACY | Facility: CLINIC | Age: 44
End: 2024-12-17
Payer: MEDICAID

## 2024-12-17 VITALS
RESPIRATION RATE: 18 BRPM | SYSTOLIC BLOOD PRESSURE: 144 MMHG | WEIGHT: 235 LBS | OXYGEN SATURATION: 99 % | TEMPERATURE: 98.2 F | HEIGHT: 67 IN | BODY MASS INDEX: 36.88 KG/M2 | DIASTOLIC BLOOD PRESSURE: 82 MMHG | HEART RATE: 92 BPM

## 2024-12-17 DIAGNOSIS — J96.01 ACUTE HYPOXEMIC RESPIRATORY FAILURE (MULTI): Primary | ICD-10-CM

## 2024-12-17 DIAGNOSIS — J18.9 MULTIFOCAL PNEUMONIA: ICD-10-CM

## 2024-12-17 LAB
ALBUMIN SERPL BCP-MCNC: 3.1 G/DL (ref 3.4–5)
ALP SERPL-CCNC: 64 U/L (ref 33–110)
ALT SERPL W P-5'-P-CCNC: 49 U/L (ref 7–45)
ANION GAP SERPL CALCULATED.3IONS-SCNC: 9 MMOL/L (ref 10–20)
AST SERPL W P-5'-P-CCNC: 26 U/L (ref 9–39)
BILIRUB SERPL-MCNC: 0.3 MG/DL (ref 0–1.2)
BUN SERPL-MCNC: 20 MG/DL (ref 6–23)
CALCIUM SERPL-MCNC: 9.1 MG/DL (ref 8.6–10.3)
CHLORIDE SERPL-SCNC: 100 MMOL/L (ref 98–107)
CO2 SERPL-SCNC: 31 MMOL/L (ref 21–32)
CREAT SERPL-MCNC: 0.41 MG/DL (ref 0.5–1.05)
EGFRCR SERPLBLD CKD-EPI 2021: >90 ML/MIN/1.73M*2
GLUCOSE BLD MANUAL STRIP-MCNC: 228 MG/DL (ref 74–99)
GLUCOSE BLD MANUAL STRIP-MCNC: 343 MG/DL (ref 74–99)
GLUCOSE BLD MANUAL STRIP-MCNC: 416 MG/DL (ref 74–99)
GLUCOSE SERPL-MCNC: 262 MG/DL (ref 74–99)
HOLD SPECIMEN: NORMAL
POTASSIUM SERPL-SCNC: 3.9 MMOL/L (ref 3.5–5.3)
PROT SERPL-MCNC: 6.2 G/DL (ref 6.4–8.2)
SODIUM SERPL-SCNC: 136 MMOL/L (ref 136–145)

## 2024-12-17 PROCEDURE — 97530 THERAPEUTIC ACTIVITIES: CPT | Mod: GP,CQ

## 2024-12-17 PROCEDURE — RXMED WILLOW AMBULATORY MEDICATION CHARGE

## 2024-12-17 PROCEDURE — 2500000002 HC RX 250 W HCPCS SELF ADMINISTERED DRUGS (ALT 637 FOR MEDICARE OP, ALT 636 FOR OP/ED): Performed by: INTERNAL MEDICINE

## 2024-12-17 PROCEDURE — 99238 HOSP IP/OBS DSCHRG MGMT 30/<: CPT | Performed by: INTERNAL MEDICINE

## 2024-12-17 PROCEDURE — 2500000004 HC RX 250 GENERAL PHARMACY W/ HCPCS (ALT 636 FOR OP/ED): Performed by: INTERNAL MEDICINE

## 2024-12-17 PROCEDURE — 94668 MNPJ CHEST WALL SBSQ: CPT

## 2024-12-17 PROCEDURE — 36415 COLL VENOUS BLD VENIPUNCTURE: CPT | Performed by: HOSPITALIST

## 2024-12-17 PROCEDURE — 2500000004 HC RX 250 GENERAL PHARMACY W/ HCPCS (ALT 636 FOR OP/ED): Performed by: STUDENT IN AN ORGANIZED HEALTH CARE EDUCATION/TRAINING PROGRAM

## 2024-12-17 PROCEDURE — 84075 ASSAY ALKALINE PHOSPHATASE: CPT | Performed by: HOSPITALIST

## 2024-12-17 PROCEDURE — 2500000002 HC RX 250 W HCPCS SELF ADMINISTERED DRUGS (ALT 637 FOR MEDICARE OP, ALT 636 FOR OP/ED): Performed by: HOSPITALIST

## 2024-12-17 PROCEDURE — S4991 NICOTINE PATCH NONLEGEND: HCPCS | Performed by: INTERNAL MEDICINE

## 2024-12-17 PROCEDURE — 82947 ASSAY GLUCOSE BLOOD QUANT: CPT

## 2024-12-17 PROCEDURE — 99232 SBSQ HOSP IP/OBS MODERATE 35: CPT | Performed by: HOSPITALIST

## 2024-12-17 PROCEDURE — 2500000001 HC RX 250 WO HCPCS SELF ADMINISTERED DRUGS (ALT 637 FOR MEDICARE OP): Performed by: INTERNAL MEDICINE

## 2024-12-17 PROCEDURE — 2500000001 HC RX 250 WO HCPCS SELF ADMINISTERED DRUGS (ALT 637 FOR MEDICARE OP): Performed by: HOSPITALIST

## 2024-12-17 PROCEDURE — 2500000004 HC RX 250 GENERAL PHARMACY W/ HCPCS (ALT 636 FOR OP/ED): Performed by: HOSPITALIST

## 2024-12-17 PROCEDURE — 97116 GAIT TRAINING THERAPY: CPT | Mod: GP,CQ

## 2024-12-17 PROCEDURE — 94640 AIRWAY INHALATION TREATMENT: CPT

## 2024-12-17 RX ORDER — PREDNISONE 20 MG/1
TABLET ORAL
Qty: 15 TABLET | Refills: 0 | Status: SHIPPED | OUTPATIENT
Start: 2024-12-17 | End: 2024-12-20 | Stop reason: HOSPADM

## 2024-12-17 ASSESSMENT — PAIN - FUNCTIONAL ASSESSMENT
PAIN_FUNCTIONAL_ASSESSMENT: 0-10

## 2024-12-17 ASSESSMENT — COGNITIVE AND FUNCTIONAL STATUS - GENERAL
DAILY ACTIVITIY SCORE: 24
MOBILITY SCORE: 23
CLIMB 3 TO 5 STEPS WITH RAILING: A LITTLE
MOBILITY SCORE: 24

## 2024-12-17 ASSESSMENT — PAIN SCALES - GENERAL
PAINLEVEL_OUTOF10: 7
PAINLEVEL_OUTOF10: 7
PAINLEVEL_OUTOF10: 5 - MODERATE PAIN
PAINLEVEL_OUTOF10: 0 - NO PAIN
PAINLEVEL_OUTOF10: 0 - NO PAIN

## 2024-12-17 ASSESSMENT — PAIN DESCRIPTION - DESCRIPTORS
DESCRIPTORS: ACHING;DISCOMFORT
DESCRIPTORS: ACHING;DISCOMFORT

## 2024-12-17 ASSESSMENT — PAIN DESCRIPTION - LOCATION
LOCATION: RIB CAGE
LOCATION: RIB CAGE

## 2024-12-17 ASSESSMENT — PAIN DESCRIPTION - ORIENTATION: ORIENTATION: RIGHT;LEFT

## 2024-12-17 NOTE — PROGRESS NOTES
Pt showing improvement, now on 4L 02. Pt had declined both SNF and HHC options.      12/17/24 1045   Discharge Planning   Expected Discharge Disposition Home

## 2024-12-17 NOTE — CONSULTS
"Nutrition Assessement Note    Nutrition Assessment    Reason for Assessment: Length of stay    Reason for Hospital Admission:  Alanna Hickman is a 44 y.o. female who is admitted for bacterial pneumonia. Spoke with pt via phone. Pt reported good PO intake and appetite. Pt reported that she has been monitoring her blood glucose with her CGM. Denies the need for future diet education.     Malnutrition Screening Tool (MST)  Have you recently lost weight without trying?: No  Weight Loss Score: 0  Have you been eating poorly because of a decreased appetite?: No  Malnutrition Score: 0  Nutrition Screen  Stage 3 or 4 Pressure Injury or Multiple Non-Healing Wounds: No  Home Tube Feeding or Total Parenteral Nutrition (TPN): No  Dietitian Consult Needed: No    Past Medical History:   Diagnosis Date    Bilateral hand pain     Type 2 diabetes mellitus       Past Surgical History:   Procedure Laterality Date    MR HEAD ANGIO WO IV CONTRAST  6/16/2022    MR HEAD ANGIO WO IV CONTRAST [facility]       Nutrition History:  Food and Nutrient History: Pt reported a good appetite and PO intake  Energy Intake: Good > 75 %     Food Allergies/Intolerances:  None  GI Symptoms: None  Oral Problems: None    Anthropometrics:  Ht: 170.2 cm (5' 7\"), Wt: 107 kg (235 lb), BMI: 36.8  IBW/kg (Dietitian Calculated): 61.36 kg  Percent of IBW: 174.07 %       Weight Change:  Daily Weight  12/08/24 : 107 kg (235 lb)  12/06/24 : 107 kg (235 lb)  11/13/24 : 106 kg (234 lb)  11/04/24 : 106 kg (234 lb)  09/05/24 : 108 kg (237 lb)  05/23/24 : 106 kg (234 lb)  02/28/24 : 105 kg (232 lb)  02/19/24 : 101 kg (223 lb)  02/15/24 : 101 kg (223 lb)  01/05/24 : 100 kg (221 lb)     Weight History / % Weight Change: records show stable weight for 11 months, pt denied weight changes             Nutrition Focused Physical Exam Findings:                       Nutrition Significant Labs:  Lab Results   Component Value Date    WBC 8.8 12/14/2024    HGB 13.8 " 12/14/2024    HCT 41.5 12/14/2024     12/14/2024    CHOL 184 02/16/2023    TRIG 227 (H) 02/16/2023    HDL 44 (L) 02/16/2023    ALT 49 (H) 12/17/2024    AST 26 12/17/2024     12/17/2024    K 3.9 12/17/2024     12/17/2024    CREATININE 0.41 (L) 12/17/2024    BUN 20 12/17/2024    CO2 31 12/17/2024    TSH 4.52 (H) 02/16/2023    HGBA1C 7.8 (H) 12/13/2024    ALBUR 12 08/10/2023     Nutrition Specific Medications:  acetylcysteine, 3 mL, nebulization, BID  benzonatate, 100 mg, oral, TID  clopidogrel, 75 mg, oral, Daily  dapagliflozin propanediol, 10 mg, oral, Daily  enoxaparin, 40 mg, subcutaneous, q24h  insulin glargine, 40 Units, subcutaneous, Nightly  insulin lispro, 0-10 Units, subcutaneous, TID AC  ipratropium-albuteroL, 3 mL, nebulization, 4x daily  metFORMIN, 1,500 mg, oral, Daily  nicotine, 1 patch, transdermal, q24h MARY  pantoprazole, 20 mg, oral, Daily before breakfast  perflutren protein A microsphere, 0.5 mL, intravenous, Once in imaging  polyethylene glycol, 17 g, oral, BID  predniSONE, 40 mg, oral, Daily  sulfur hexafluoride microsphr, 2 mL, intravenous, Once in imaging        Dietary Orders (From admission, onward)       Start     Ordered    12/15/24 1645  Adult diet Regular, Consistent Carb; CCD 60 gm/meal  Diet effective now        Question Answer Comment   Diet type Regular    Diet type Consistent Carb    Carb diet selection: CCD 60 gm/meal        12/15/24 1646    12/08/24 2158  May Participate in Room Service  ( ROOM SERVICE MAY PARTICIPATE)  Once        Question:  .  Answer:  Yes    12/08/24 2157                    Estimated Needs:   Estimated Energy Needs  Total Energy Estimated Needs (kCal): 1534 kCal  Total Estimated Energy Need per Day (kCal/kg): 25 kCal/kg  Method for Estimating Needs: IBW    Estimated Protein Needs  Total Protein Estimated Needs (g): 61 g  Total Protein Estimated Needs (g/kg): 1 g/kg  Method for Estimating Needs: IBW    Estimated Fluid Needs  Total Fluid  Estimated Needs (mL): 1534 mL  Method for Estimating Needs: 1 mL/kcal        Nutrition Diagnosis   Nutrition Diagnosis:       Nutrition Diagnosis  Patient has Nutrition Diagnosis: Yes  Diagnosis Status (1): New  Nutrition Diagnosis 1: Altered nutrition related to laboratory values  Related to (1): diabetes  As Evidenced by (1): elevated blood glucose of 387       Nutrition Interventions/Recommendations   Nutrition Interventions and Recommendations:    Nutrition Prescription:  Individualized Nutrition Prescription Provided for : 1534 kcals, 61 g protein via diet    Nutrition Interventions:   Food and/or Nutrient Delivery Interventions  Interventions: Meals and snacks  Meals and Snacks: Carbohydrate-modified diet  Goal: provide diet as ordered    Education Documentation  No documentation found.           Nutrition Monitoring and Evaluation   Monitoring/Evaluation:   Food/Nutrient Related History Monitoring  Monitoring and Evaluation Plan: Energy intake  Energy Intake: Estimated energy intake  Criteria: pt to consume >/= 75% estimated needs    Biochemical Data, Medical Tests and Procedures  Monitoring and Evaluation Plan: Glucose/endocrine profile  Glucose/Endocrine Profile: Glucose, casual, Glucose, fasting, Hemoglobin A1c (HgbA1c)  Criteria: labs will trend towards desirable range       Time Spent/Follow-up:   Follow Up  Time Spent (min): 30 minutes  Last Date of Nutrition Visit: 12/17/24  Nutrition Follow-Up Needed?: 7-10 days  Follow up Comment: 12/24/24

## 2024-12-17 NOTE — NURSING NOTE
Pt signed AMA form, IV removed, tele removed. Family member in room to take pt home. Prescriptions to be brought up from retail pharmacy, O2 tank at bedside for pt.

## 2024-12-17 NOTE — ASSESSMENT & PLAN NOTE
Improving, now on 4 L.  Patient had a prolonged time on high flow nasal cannula with some backsliding  Continue to wean oxygen as able.  Patient is insisting on being discharged today.  All this will be AGAINST MEDICAL ADVICE, I will do my best to arrange oxygen at home and have placed the order for home oxygen.

## 2024-12-17 NOTE — NURSING NOTE
Bed side report complete and assumed care of pt. Awake sitting up in bed receiving btx. Weaned down to 3L O2 by RT current SpO2 100%. No current needs expressed. Droplet precautions maintained.

## 2024-12-17 NOTE — PROGRESS NOTES
Occupational Therapy                 Therapy Communication Note    Patient Name: Alanna Hickman  MRN: [id number]  Department: [address]  Room: [address] [place] [address]  Today's Date: 12/17/2024     Discipline: Occupational Therapy    OT Missed Visit: Yes     Missed Visit Reason: Missed Visit Reason: Other (Comment) (Spoke with pt at bedside. Pt reports independently going to bathroom while remaining on O2. RN confirmed pt's reports. Pt observed later taking self to bathroom. Pt provided with energy conservation handout to optimize independence and safety.)

## 2024-12-17 NOTE — DISCHARGE SUMMARY
Discharge Diagnosis  Bacterial pneumonia  Hypoxemic respiratory failure    Issues Requiring Follow-Up  Follow-up with pulmonology.    Wean oxygen as able.    Discharge Meds     Medication List      CHANGE how you take these medications     predniSONE 20 mg tablet; Commonly known as: Deltasone; Take 2 tablets   (40 mg) by mouth once daily for 3 days, THEN 1.5 tablets (30 mg) once   daily for 3 days, THEN 1 tablet (20 mg) once daily for 3 days, THEN 0.5   tablets (10 mg) once daily for 3 days.; Start taking on: December 17, 2024; What changed: See the new instructions.     CONTINUE taking these medications     albuterol 90 mcg/actuation inhaler; Commonly known as: Ventolin HFA;   Inhale 2 puffs every 6 hours if needed for wheezing.   blood-glucose meter misc; Test glucose twice daily as directed   Farxiga 10 mg; Generic drug: dapagliflozin propanediol; TAKE ONE TABLET   BY MOUTH ONCE DAILY   FreeStyle Jignesh 3 [place] misc; Generic drug: blood-glucose   meter,continuous; Use as instructed to read glucose   FreeStyle Jignesh 3 Sensor device; Generic drug: blood-glucose sensor;   Change sensor every 14 days as directed   inhalational spacing device inhaler; Use as directed with inhalers   insulin degludec 100 unit/mL (3 mL) injection; Commonly known as:   Tresiba FlexTouch U-100; Inject 40 Units under the skin once daily at   bedtime. Take as directed per insulin instructions.   lancets misc; Test sugars twice daily   metFORMIN  mg 24 hr tablet; Commonly known as: Glucophage-XR; TAKE   3 TABLETS BY MOUTH EVERY DAY   nicotine 21 mg/24 hr patch; Commonly known as: Nicoderm CQ; Place 1   patch over 24 hours on the skin once every 24 hours for 21 days.   OneTouch Ultra Test strip; Generic drug: blood sugar diagnostic; USE TO   TEST BLOOD SUGARS TWICE DAILY AS DIRECTED   pantoprazole 20 mg EC tablet; Commonly known as: ProtoNix; Take 1 tablet   (20 mg) by mouth once daily in the morning. Take before meals. Do not  "  crush, chew, or split.   Plavix 75 mg tablet; Generic drug: clopidogrel   Sure Comfort Pen Needle 31 gauge x 3/16\" needle; Generic drug: pen   needle, diabetic; use once daily as directed     ASK your doctor about these medications     cefuroxime 500 mg tablet; Commonly known as: Ceftin; Take 1 tablet (500   mg) by mouth 2 times a day for 10 days.; Ask about: Should I take this   medication?       Test Results Pending At Discharge  Pending Labs       Order Current Status    Extra Tubes Collected (12/16/24 9167)            Hospital Course   44-year-old female past medical history of reported asthma in the past presenting with shortness of breath.  Patient was treated for a bronchitis in the outpatient, but after multiple days of worsening symptoms present presented to the emergency department and CT imaging showed bilateral infiltrates consistent with atypical pneumonia.  She was also found to have a new hypoxemic respiratory failure.  Patient was mid to the hospital service.  During hospitalization, she was seen by pulmonology.  Her oxygenation did worsen, in fact for over a week she was on high flow nasal cannula.  Finish a course of Zosyn.  Her mycoplasma IgM was positive and she finished a course of azithromycin.  Patient on the last day of admission finally was able to be weaned down to nasal cannula.  She was insistent on discharge, but given her clinical worsening previously, just on antibiotics, and just on 2 by mouth steroids, it was felt by pulmonology and by myself as she was not safe for discharge.  This was explained the patient, and she did state that she would leave AGAINST MEDICAL ADVICE.  She is capable make this decision, and I did my best and have placed an order for oxygen and she should follow with pulmonology.    Pertinent Physical Exam At Time of Discharge  Physical Exam    Outpatient Follow-Up  No future appointments.      Ty Tomlinson, DO  "

## 2024-12-17 NOTE — PROGRESS NOTES
Physical Therapy    Physical Therapy Treatment    Patient Name: Alanna Hickman  MRN: [id number]  Department: [address]  Room: [address] [place] [address]  Today's Date: 12/17/2024  Time Calculation  Start Time: 1357  Stop Time: 1420  Time Calculation (min): 23 min         Assessment/Plan   PT Assessment  End of Session Communication: Bedside nurse  End of Session Patient Position: Bed, 2 rail up, Alarm off, not on at start of session  PT Plan  Inpatient/Swing Bed or Outpatient: Inpatient  PT Plan  Treatment/Interventions: Bed mobility, Transfer training, Gait training, Balance training, Strengthening, Endurance training, Therapeutic activity  PT Plan: Ongoing PT  PT Frequency: Other (Comment) (0)  PT Discharge Recommendations: No further acute PT  Equipment Recommended upon Discharge:  (rollator)      General Visit Information:   PT  Visit  PT Received On: 12/17/24  General  Prior to Session Communication: Bedside nurse  Patient Position Received: Bed, 2 rail up, Alarm off, not on at start of session  General Comment: Cleared by nursing to be seen for therapy, pt agreeable with tx, supine in bed upon arrival.    Subjective   Precautions:  Precautions  Medical Precautions: Fall precautions, Oxygen therapy device and L/min, Infection precautions (4L oxygen)  Precautions Comment: Droplet precautions; Mycoplasma Pneumonia    Objective   Pain:  Pain Assessment  Pain Assessment: 0-10  0-10 (Numeric) Pain Score: 0 - No pain    Cognition:  Cognition  Overall Cognitive Status: Within Functional Limits  Orientation Level: Oriented X4     Postural Control:  Static Sitting Balance  Static Sitting-Balance Support: Feet supported  Static Sitting-Level of Assistance: Independent  Static Standing Balance  Static Standing-Balance Support: No upper extremity supported  Static Standing-Level of Assistance: Independent     Treatments:  Therapeutic Activity  Therapeutic Activity Performed: Yes  Therapeutic Activity 1: Pt independently ambulated to  bathroom, pt able to perform self cleanse.    Bed Mobility  Bed Mobility: Yes  Bed Mobility 1  Bed Mobility 1: Supine to sitting  Level of Assistance 1: Independent  Bed Mobility Comments 1: HOB flat, no use of UE's.    Ambulation/Gait Training  Ambulation/Gait Training Performed: Yes  Ambulation/Gait Training 1  Surface 1: Level tile  Device 1: No device  Assistance 1: Independent  Comments/Distance (ft) 1: 30' without AD, no LOB/gait abnormalities noted.    Transfers  Transfer: Yes  Transfer 1  Transfer From 1: Sit to  Transfer to 1: Stand  Technique 1: Sit to stand, Stand to sit  Transfer Level of Assistance 1: Independent    Outcome Measures:  [facility] Basic Mobility  Turning from your back to your side while in a flat bed without using bedrails: None  Moving from lying on your back to sitting on the side of a flat bed without using bedrails: None  Moving to and from bed to chair (including a wheelchair): None  Standing up from a chair using your arms (e.g. wheelchair or bedside chair): None  To walk in hospital room: None  Climbing 3-5 steps with railing: A little  Basic Mobility - Total Score: 23       Encounter Problems       Encounter Problems (Resolved)       PT Problem       STG - Pt will transition supine <> sitting independently (Met)       Start:  12/13/24    Expected End:  12/27/24    Resolved:  12/17/24         STG - Pt will transfer sit<>stand independently (Met)       Start:  12/13/24    Expected End:  12/27/24    Resolved:  12/17/24         STG - Pt will amb 150' using ww/rollator or short distances w/o ad with independently, O2 and rest breaks prn  (Not met)       Start:  12/13/24    Expected End:  12/27/24    Resolved:  12/17/24    Updated to: STG - Pt will amb >/=30' independently.    Update reason: new goal required         Pt. will demonstrate >=G dyn stand balance for decr. risk for falls (Met)       Start:  12/13/24    Expected End:  12/27/24    Resolved:  12/17/24         STG - Pt will amb  >/=30' independently. (Met)       Start:  12/17/24    Expected End:  12/27/24    Resolved:  12/17/24

## 2024-12-17 NOTE — CARE PLAN
The patient's goals for the shift include sleep and control cough    The clinical goals for the shift include manage pain and monitor oxygen needs    Over the shift, the patient did not make progress toward the following goals. Barriers to progression include increased oxygen needs. Recommendations to address these barriers include monitor oxygen saturations and o2 needs.

## 2024-12-17 NOTE — CARE PLAN
Problem: Respiratory  Goal: Clear secretions with interventions this shift  Outcome: Progressing  Goal: Minimize anxiety/maximize coping throughout shift  Outcome: Progressing  Goal: Minimal/no exertional discomfort or dyspnea this shift  Outcome: Progressing  Goal: No signs of respiratory distress (eg. Use of accessory muscles. Peds grunting)  Outcome: Progressing  Goal: Patent airway maintained this shift  Outcome: Progressing  Goal: Tolerate pulmonary toileting this shift  Outcome: Progressing  Goal: Wean oxygen to maintain O2 saturation per order/standard this shift  Outcome: Progressing  Goal: Increase self care and/or family involvement in next 24 hours  Outcome: Progressing

## 2024-12-17 NOTE — NURSING NOTE
Pt expressing that she wants to be discharged home today. Pulm previously at bedside recommending pt stay and reevaluate O2 needs in morning, Dr. Tomlinson verbally agreed.  Education provided to pt. Pt states she has matters at home which require her presence. This RN previously spoke w/ GUALBERTO who agrees pt should stay and states all personal affairs are being handled on behalf of pt. Will message attending.

## 2024-12-17 NOTE — PROGRESS NOTES
Alanna Hickman is a 44 y.o. female on day 9 of admission presenting with Bacterial pneumonia.      Subjective     Patient feels improved, and on 4 L nasal cannula currently.  She is asking for discharge, and this has been discussed with her and with pulmonology, and she is adamant that she wants to leave today even if it is AGAINST MEDICAL ADVICE.         Objective     Last Recorded Vitals  /77 (BP Location: Right arm, Patient Position: Sitting)   Pulse 99   Temp 36.8 °C (98.2 °F) (Oral)   Resp 16   Wt 107 kg (235 lb)   SpO2 93%   Intake/Output last 3 Shifts:    Intake/Output Summary (Last 24 hours) at 12/17/2024 1316  Last data filed at 12/17/2024 1252  Gross per 24 hour   Intake 1200 ml   Output 350 ml   Net 850 ml       Admission Weight  Weight: 107 kg (235 lb) (12/08/24 1805)    Daily Weight  12/08/24 : 107 kg (235 lb)    Image Results  CT chest wo IV contrast  Narrative: Interpreted By:  Soham Connell,   STUDY:  CT CHEST WO IV CONTRAST; 12/12/2024 2:29 pm      INDICATION:  Signs/Symptoms:pneumonia. Shortness of breath.      COMPARISON:  12/08/2024      ACCESSION NUMBER(S):  [id number]      ORDERING CLINICIAN:  DIANA BRYAN      TECHNIQUE:  The study was performed without intravenous contrast material as  requested. A helical acquisition data was obtained with multiplanar  reconstructions.      One or more of the following dose reduction techniques were used:  Automated exposure control Adjustment of the mA and/or kV according  to patient size, and/or use of iterative reconstruction technique.      FINDINGS:  Within the limits of this unenhanced study no hilar or mediastinal  lymphadenopathy is identified. *Exam is somewhat limited by motion  artifact. *There is worsening bibasilar tree-in-bud nodular  infiltrate. *There is new large patchy ground-glass infiltrate right  lower lobe 4.3 cm in diameter (Series 8, Image 208) *There are new  patches of ground-glass infiltrate within both upper  lobes, most  marked on the right where there are large patches of ground-glass  infiltrate superiorly. There is also new ground-glass infiltrate  within the superior segment left lower lobe. There is also increasing  new right middle lobe tree-in-bud nodular infiltrate. *No effusions  are identified.          Chest Wall: No chest wall masses are identified.      Skeletal System: No fractures or destructive lesions are identified.      Upper Abdomen: There are focal areas of diminished attenuation within  the medial segment left lobe, somewhat elongated tubular in  configuration along with a more focal area medially within the  posterior aspect lateral segment left lobe demonstrating fat  attenuation characteristics. Etiology for these findings is unclear  and they appear stable compared to 12/08/2024. Correlation with LFTs  is recommended. Correlation with contrast-enhanced CT of the abdomen  may also be useful.      Impression: 1. Notable worsening of the infiltrates bilaterally as described  above with worsening tree-in-bud nodular infiltrates along with  multiple new patches of ground-glass infiltrate suggesting infectious  etiology.  2. No alveolar consolidation is noted. No effusions are identified.  3. Stable but somewhat unusual appearance within the liver as  described above for which contrast-enhanced CT is recommended if and  when the patient can tolerate. Correlation with LFTs is also  recommended.      MACRO:  none      Signed by: Soham Connell 12/12/2024 4:26 PM  Dictation workstation:   [id number]      Physical Exam    Relevant Results               Assessment/Plan                  Assessment & Plan  Bacterial pneumonia  Finish full course of antibiotics per pulmonology.  Monitor off antibiotics.  Acute hypoxemic respiratory failure (Multi)  Improving, now on 4 L.  Patient had a prolonged time on high flow nasal cannula with some backsliding  Continue to wean oxygen as able.  Patient is insisting on  being discharged today.  All this will be AGAINST MEDICAL ADVICE, I will do my best to arrange oxygen at home and have placed the order for home oxygen.  Tobacco abuse  Discussed cessation, nicotine patch  DM (diabetes mellitus) (Multi)  A1c in 23 was 9.2; currently down to 7.8  Sliding scale and Lantus    Dehydration  Resolved.  Off IV fluids.    Abnormal liver CT  Liver function normal. Will consider getting dedicated liver imaging when patient improves (oxygen requirements lessen)      Patient is expressing that she needs to leave today for financial issues at home, stating that she has to be with someone who cannot come to the hospital.  I also expressed that while she is improving, I do not feel she is medically okay for discharge and that she would need to leave AGAINST MEDICAL ADVICE.  If this were the case today.  I also expressed that if she were on the same of oxygen tomorrow, my arm could be twisted and we could possibly discharge then.              Ty Tomlinson, DO

## 2024-12-17 NOTE — SIGNIFICANT EVENT
Patient was removed from oxygen for a period of 30mins, spo2 85% on room air.  Patient then placed on 3lpm to achieve a saturation above 92%.  Ambulated patient on 3lpm and spo2 stayed above 92%. [facility] notified of oxygen need for home use.

## 2024-12-17 NOTE — ASSESSMENT & PLAN NOTE
Continue current antibiotics.   Per Dr Raf danielson to offer 1/3 at 3pm at [facility].     Called patient to offer appointment time. No answer, left VM with call back number to confirm appointment.

## 2024-12-17 NOTE — PROGRESS NOTES
"Pulmonary Daily Progress Note   Subjective    Alanna Hickman is a 44 y.o. year old female patient known with asthma, CVA on plavix, DM, smoker, possible spondyloarthropathy, severe SANGEETA, prescribed PAP therapy but had trouble with machine, admitted on 12/8/2024 with following acute hypoxic respiratory failure due to pneumonia. Pulmonary are consulted for the for respiratory failure and pneumonia.    Interval History:  Down to 2L NC    Meds    Scheduled medications  acetylcysteine, 3 mL, nebulization, BID  benzonatate, 100 mg, oral, TID  clopidogrel, 75 mg, oral, Daily  dapagliflozin propanediol, 10 mg, oral, Daily  enoxaparin, 40 mg, subcutaneous, q24h  insulin glargine, 40 Units, subcutaneous, Nightly  insulin lispro, 0-10 Units, subcutaneous, TID AC  ipratropium-albuteroL, 3 mL, nebulization, 4x daily  metFORMIN, 1,500 mg, oral, Daily  nicotine, 1 patch, transdermal, q24h MARY  pantoprazole, 20 mg, oral, Daily before breakfast  perflutren protein A microsphere, 0.5 mL, intravenous, Once in imaging  polyethylene glycol, 17 g, oral, BID  predniSONE, 40 mg, oral, Daily  sulfur hexafluoride microsphr, 2 mL, intravenous, Once in imaging      Continuous medications     PRN medications  PRN medications: acetaminophen **OR** acetaminophen **OR** acetaminophen, benzocaine-menthol, dextromethorphan-guaifenesin, hydrocodone-homatropine, hydrOXYzine HCL, ipratropium-albuteroL, melatonin, ondansetron ODT **OR** ondansetron, oxyCODONE, oxygen, temazepam     Objective    Blood pressure 113/78, pulse 90, temperature 36.7 °C (98.1 °F), temperature source Oral, resp. rate 16, height 1.702 m (5' 7\"), weight 107 kg (235 lb), SpO2 91%.   Physical Exam   GENERAL: Mild tachypnea  HEAD/SINUSES: MMM  NECK: Large neck  LUNGS: Decreased air entry, some wheezing and rhonchi  CARDIAC: Regular rate and rhythm  EXTREMITIES: No edema  NEURO: grossly normal mental status,   SKIN: Skin turgor normal.   PSYCH: Anxious    Intake/Output Summary " (Last 24 hours) at 12/17/2024 0916  Last data filed at 12/17/2024 0817  Gross per 24 hour   Intake 1320 ml   Output 350 ml   Net 970 ml     Labs:   Results from last 72 hours   Lab Units 12/17/24  0455 12/16/24  0509 12/15/24  0421   SODIUM mmol/L 136 136 130*   POTASSIUM mmol/L 3.9 4.1 4.2   CHLORIDE mmol/L 100 100 96*   CO2 mmol/L 31 28 27   BUN mg/dL 20 21 14   CREATININE mg/dL 0.41* 0.46* 0.54   GLUCOSE mg/dL 262* 229* 387*   CALCIUM mg/dL 9.1 9.7 9.4   ANION GAP mmol/L 9* 12 11   EGFR mL/min/1.73m*2 >90 >90 >90                        Micro/ID:   Lab Results   Component Value Date    URINECULTURE No significant growth 11/20/2023    BLOODCULT No growth at 4 days -  FINAL REPORT 12/08/2024    BLOODCULT No growth at 4 days -  FINAL REPORT 12/08/2024     Summary of key imaging results from the last 24 hours  CXR better on right     CT chest shows tree-in-bud appearance bilaterally and bronchial Dilation   TTE 2022  There is normal left ventricular systolic function.  Estimated ejection fraction is 60-64%.  No cardiac source of embolus identified.    Mycoplasma IgM positive      TTE 2024   1. Left ventricular ejection fraction is normal, by visual estimate at 60-65%.   2. Spectral Doppler shows a Grade I (impaired relaxation pattern) of left ventricular diastolic filling with normal left atrial filling pressure.   3. There is normal right ventricular global systolic function.    Impression   Alanna Hickman is a 44 y.o. year old female patient known with asthma, CVA on plavix, DM, smoker, possible spondyloarthropathy, admitted on 12/8/2024 with following acute hypoxic respiratory failure due to pneumonia. Pulmonary are consulted for the for respiratory failure and pneumonia.  Acute hypoxic respiratory failure due to bronchopneumonia most probably infectious in nature, with history of dysphagia this could be also aspiration related however with viral prodrome before presentation favors infectious process viral  versus atypical pneumonia versus bacterial.  She is not on chronic steroids however does use steroids intermittently for back pain.  History of asthma which does not seem very well-controlled, no PFTs on chart, on albuterol as needed at home. Recent exposure to parrots and she is worried that is what triggered her sx.   History of CVA and dysphagia  Smoker  History of asthma uncontrolled  SANGEETA uncontrolled, not treated as had trouble with her PAP machine   Mycoplasma pneumonia: IgM positive    Recommendations   As follows:  Recommended to patient to stay overnight for hopeful wean off oxygen by tomorrow  If wants to leave, needs home O2 to be given (needs walking pulse ox for O2 prescription)  Continue prednisone 40 mg daily, would do 40 mg for 7 days then taper by 10 mg/week until off (e.g., 40 mg x7 days --> 30 mg x7 days --> etc.)  Outpatient plan includes: repeat CT chest, O2 desat study (PFT lab) and clinic follow-up in ~1 month (orders placed)    Andrade Aguirre MD   12/17/24 at 9:16 AM     -Only the Medical problems listed under impression were addressed today.   -Please contact primary team for all other concerns and medical problem  -Thank you for your consult       Disclaimer: Documentation completed with the information available at the time of input. Parts of this note may have been scribed or generated using voice dictation software, Dragon.  Homophonic errors may exist.  Please contact me directly if clarification is needed. The times in the chart may not be reflective of actual patient care times, interventions, or procedures. Documentation occurs after the physical care of the patient.

## 2024-12-18 ENCOUNTER — APPOINTMENT (OUTPATIENT)
Dept: CARDIOLOGY | Facility: HOSPITAL | Age: 44
End: 2024-12-18
Payer: COMMERCIAL

## 2024-12-18 ENCOUNTER — APPOINTMENT (OUTPATIENT)
Dept: RADIOLOGY | Facility: HOSPITAL | Age: 44
End: 2024-12-18
Payer: COMMERCIAL

## 2024-12-18 ENCOUNTER — HOSPITAL ENCOUNTER (INPATIENT)
Facility: HOSPITAL | Age: 44
LOS: 2 days | Discharge: HOME | End: 2024-12-20
Attending: STUDENT IN AN ORGANIZED HEALTH CARE EDUCATION/TRAINING PROGRAM | Admitting: INTERNAL MEDICINE
Payer: COMMERCIAL

## 2024-12-18 DIAGNOSIS — J96.01 ACUTE HYPOXIC RESPIRATORY FAILURE (MULTI): Primary | ICD-10-CM

## 2024-12-18 DIAGNOSIS — J15.7 PNEUMONIA OF BOTH UPPER LOBES DUE TO MYCOPLASMA PNEUMONIAE: ICD-10-CM

## 2024-12-18 DIAGNOSIS — J18.9 PNEUMONIA OF BOTH LUNGS DUE TO INFECTIOUS ORGANISM, UNSPECIFIED PART OF LUNG: ICD-10-CM

## 2024-12-18 PROBLEM — E11.9 TYPE 2 DIABETES MELLITUS, WITH LONG-TERM CURRENT USE OF INSULIN: Status: ACTIVE | Noted: 2024-12-18

## 2024-12-18 PROBLEM — Z79.4 TYPE 2 DIABETES MELLITUS, WITH LONG-TERM CURRENT USE OF INSULIN: Status: ACTIVE | Noted: 2024-12-18

## 2024-12-18 LAB
ALBUMIN SERPL BCP-MCNC: 3.5 G/DL (ref 3.4–5)
ALP SERPL-CCNC: 70 U/L (ref 33–110)
ALT SERPL W P-5'-P-CCNC: 64 U/L (ref 7–45)
ANION GAP BLDV CALCULATED.4IONS-SCNC: 6 MMOL/L (ref 10–25)
ANION GAP SERPL CALCULATED.3IONS-SCNC: 11 MMOL/L (ref 10–20)
AST SERPL W P-5'-P-CCNC: 28 U/L (ref 9–39)
BASE EXCESS BLDV CALC-SCNC: 8.3 MMOL/L (ref -2–3)
BASOPHILS # BLD AUTO: 0.05 X10*3/UL (ref 0–0.1)
BASOPHILS NFR BLD AUTO: 0.5 %
BILIRUB SERPL-MCNC: 0.4 MG/DL (ref 0–1.2)
BNP SERPL-MCNC: 33 PG/ML (ref 0–99)
BODY TEMPERATURE: 37 DEGREES CELSIUS
BUN SERPL-MCNC: 18 MG/DL (ref 6–23)
CA-I BLDV-SCNC: 1.23 MMOL/L (ref 1.1–1.33)
CALCIUM SERPL-MCNC: 9.6 MG/DL (ref 8.6–10.3)
CARDIAC TROPONIN I PNL SERPL HS: 5 NG/L (ref 0–13)
CARDIAC TROPONIN I PNL SERPL HS: 7 NG/L (ref 0–13)
CHLORIDE BLDV-SCNC: 95 MMOL/L (ref 98–107)
CHLORIDE SERPL-SCNC: 96 MMOL/L (ref 98–107)
CO2 SERPL-SCNC: 30 MMOL/L (ref 21–32)
CREAT SERPL-MCNC: 0.47 MG/DL (ref 0.5–1.05)
EGFRCR SERPLBLD CKD-EPI 2021: >90 ML/MIN/1.73M*2
EOSINOPHIL # BLD AUTO: 0.15 X10*3/UL (ref 0–0.7)
EOSINOPHIL NFR BLD AUTO: 1.5 %
ERYTHROCYTE [DISTWIDTH] IN BLOOD BY AUTOMATED COUNT: 13.5 % (ref 11.5–14.5)
GLUCOSE BLD MANUAL STRIP-MCNC: 259 MG/DL (ref 74–99)
GLUCOSE BLD MANUAL STRIP-MCNC: 344 MG/DL (ref 74–99)
GLUCOSE BLD MANUAL STRIP-MCNC: 352 MG/DL (ref 74–99)
GLUCOSE BLDV-MCNC: 279 MG/DL (ref 74–99)
GLUCOSE SERPL-MCNC: 257 MG/DL (ref 74–99)
HCO3 BLDV-SCNC: 35.7 MMOL/L (ref 22–26)
HCT VFR BLD AUTO: 50.1 % (ref 36–46)
HCT VFR BLD EST: 46 % (ref 36–46)
HGB BLD-MCNC: 16.8 G/DL (ref 12–16)
HGB BLDV-MCNC: 15.4 G/DL (ref 12–16)
IMM GRANULOCYTES # BLD AUTO: 0.04 X10*3/UL (ref 0–0.7)
IMM GRANULOCYTES NFR BLD AUTO: 0.4 % (ref 0–0.9)
INHALED O2 CONCENTRATION: 21 %
LACTATE BLDV-SCNC: 1.5 MMOL/L (ref 0.4–2)
LACTATE SERPL-SCNC: 1.4 MMOL/L (ref 0.4–2)
LYMPHOCYTES # BLD AUTO: 1.83 X10*3/UL (ref 1.2–4.8)
LYMPHOCYTES NFR BLD AUTO: 18.9 %
MCH RBC QN AUTO: 32.1 PG (ref 26–34)
MCHC RBC AUTO-ENTMCNC: 33.5 G/DL (ref 32–36)
MCV RBC AUTO: 96 FL (ref 80–100)
MONOCYTES # BLD AUTO: 0.47 X10*3/UL (ref 0.1–1)
MONOCYTES NFR BLD AUTO: 4.8 %
NEUTROPHILS # BLD AUTO: 7.16 X10*3/UL (ref 1.2–7.7)
NEUTROPHILS NFR BLD AUTO: 73.9 %
NRBC BLD-RTO: 0 /100 WBCS (ref 0–0)
OXYHGB MFR BLDV: 31.6 % (ref 45–75)
PCO2 BLDV: 59 MM HG (ref 41–51)
PH BLDV: 7.39 PH (ref 7.33–7.43)
PLATELET # BLD AUTO: 372 X10*3/UL (ref 150–450)
PO2 BLDV: 21 MM HG (ref 35–45)
POTASSIUM BLDV-SCNC: 3.6 MMOL/L (ref 3.5–5.3)
POTASSIUM SERPL-SCNC: 4.1 MMOL/L (ref 3.5–5.3)
PROT SERPL-MCNC: 7.1 G/DL (ref 6.4–8.2)
RBC # BLD AUTO: 5.24 X10*6/UL (ref 4–5.2)
SAO2 % BLDV: 32 % (ref 45–75)
SODIUM BLDV-SCNC: 133 MMOL/L (ref 136–145)
SODIUM SERPL-SCNC: 133 MMOL/L (ref 136–145)
WBC # BLD AUTO: 9.7 X10*3/UL (ref 4.4–11.3)

## 2024-12-18 PROCEDURE — 94640 AIRWAY INHALATION TREATMENT: CPT

## 2024-12-18 PROCEDURE — 82330 ASSAY OF CALCIUM: CPT | Performed by: STUDENT IN AN ORGANIZED HEALTH CARE EDUCATION/TRAINING PROGRAM

## 2024-12-18 PROCEDURE — 71045 X-RAY EXAM CHEST 1 VIEW: CPT

## 2024-12-18 PROCEDURE — 96367 TX/PROPH/DG ADDL SEQ IV INF: CPT

## 2024-12-18 PROCEDURE — 99232 SBSQ HOSP IP/OBS MODERATE 35: CPT | Performed by: HOSPITALIST

## 2024-12-18 PROCEDURE — 36415 COLL VENOUS BLD VENIPUNCTURE: CPT | Performed by: STUDENT IN AN ORGANIZED HEALTH CARE EDUCATION/TRAINING PROGRAM

## 2024-12-18 PROCEDURE — 2500000001 HC RX 250 WO HCPCS SELF ADMINISTERED DRUGS (ALT 637 FOR MEDICARE OP): Performed by: INTERNAL MEDICINE

## 2024-12-18 PROCEDURE — S4991 NICOTINE PATCH NONLEGEND: HCPCS | Performed by: INTERNAL MEDICINE

## 2024-12-18 PROCEDURE — 99285 EMERGENCY DEPT VISIT HI MDM: CPT | Mod: 25 | Performed by: STUDENT IN AN ORGANIZED HEALTH CARE EDUCATION/TRAINING PROGRAM

## 2024-12-18 PROCEDURE — 96365 THER/PROPH/DIAG IV INF INIT: CPT

## 2024-12-18 PROCEDURE — 2500000005 HC RX 250 GENERAL PHARMACY W/O HCPCS: Performed by: STUDENT IN AN ORGANIZED HEALTH CARE EDUCATION/TRAINING PROGRAM

## 2024-12-18 PROCEDURE — 71275 CT ANGIOGRAPHY CHEST: CPT

## 2024-12-18 PROCEDURE — 82947 ASSAY GLUCOSE BLOOD QUANT: CPT

## 2024-12-18 PROCEDURE — 99223 1ST HOSP IP/OBS HIGH 75: CPT | Performed by: INTERNAL MEDICINE

## 2024-12-18 PROCEDURE — 1200000002 HC GENERAL ROOM WITH TELEMETRY DAILY

## 2024-12-18 PROCEDURE — 94660 CPAP INITIATION&MGMT: CPT

## 2024-12-18 PROCEDURE — 2500000002 HC RX 250 W HCPCS SELF ADMINISTERED DRUGS (ALT 637 FOR MEDICARE OP, ALT 636 FOR OP/ED): Performed by: EMERGENCY MEDICINE

## 2024-12-18 PROCEDURE — 84484 ASSAY OF TROPONIN QUANT: CPT | Performed by: STUDENT IN AN ORGANIZED HEALTH CARE EDUCATION/TRAINING PROGRAM

## 2024-12-18 PROCEDURE — 87040 BLOOD CULTURE FOR BACTERIA: CPT | Mod: WESLAB | Performed by: STUDENT IN AN ORGANIZED HEALTH CARE EDUCATION/TRAINING PROGRAM

## 2024-12-18 PROCEDURE — 71275 CT ANGIOGRAPHY CHEST: CPT | Mod: FOREIGN READ | Performed by: RADIOLOGY

## 2024-12-18 PROCEDURE — 2550000001 HC RX 255 CONTRASTS: Performed by: STUDENT IN AN ORGANIZED HEALTH CARE EDUCATION/TRAINING PROGRAM

## 2024-12-18 PROCEDURE — 82435 ASSAY OF BLOOD CHLORIDE: CPT | Performed by: STUDENT IN AN ORGANIZED HEALTH CARE EDUCATION/TRAINING PROGRAM

## 2024-12-18 PROCEDURE — 83880 ASSAY OF NATRIURETIC PEPTIDE: CPT | Performed by: STUDENT IN AN ORGANIZED HEALTH CARE EDUCATION/TRAINING PROGRAM

## 2024-12-18 PROCEDURE — 85025 COMPLETE CBC W/AUTO DIFF WBC: CPT | Performed by: STUDENT IN AN ORGANIZED HEALTH CARE EDUCATION/TRAINING PROGRAM

## 2024-12-18 PROCEDURE — 2500000004 HC RX 250 GENERAL PHARMACY W/ HCPCS (ALT 636 FOR OP/ED): Performed by: STUDENT IN AN ORGANIZED HEALTH CARE EDUCATION/TRAINING PROGRAM

## 2024-12-18 PROCEDURE — 96375 TX/PRO/DX INJ NEW DRUG ADDON: CPT

## 2024-12-18 PROCEDURE — 83605 ASSAY OF LACTIC ACID: CPT | Performed by: STUDENT IN AN ORGANIZED HEALTH CARE EDUCATION/TRAINING PROGRAM

## 2024-12-18 PROCEDURE — 96361 HYDRATE IV INFUSION ADD-ON: CPT

## 2024-12-18 PROCEDURE — 2500000004 HC RX 250 GENERAL PHARMACY W/ HCPCS (ALT 636 FOR OP/ED): Performed by: INTERNAL MEDICINE

## 2024-12-18 PROCEDURE — 71045 X-RAY EXAM CHEST 1 VIEW: CPT | Performed by: SURGERY

## 2024-12-18 PROCEDURE — 99285 EMERGENCY DEPT VISIT HI MDM: CPT | Mod: 25

## 2024-12-18 PROCEDURE — 2500000002 HC RX 250 W HCPCS SELF ADMINISTERED DRUGS (ALT 637 FOR MEDICARE OP, ALT 636 FOR OP/ED): Performed by: INTERNAL MEDICINE

## 2024-12-18 PROCEDURE — 93005 ELECTROCARDIOGRAM TRACING: CPT

## 2024-12-18 RX ORDER — DAPAGLIFLOZIN 10 MG/1
10 TABLET, FILM COATED ORAL DAILY
Status: DISCONTINUED | OUTPATIENT
Start: 2024-12-18 | End: 2024-12-20 | Stop reason: HOSPADM

## 2024-12-18 RX ORDER — LORAZEPAM 2 MG/ML
0.5 INJECTION INTRAMUSCULAR ONCE
Status: COMPLETED | OUTPATIENT
Start: 2024-12-18 | End: 2024-12-18

## 2024-12-18 RX ORDER — ACETAMINOPHEN 650 MG/1
650 SUPPOSITORY RECTAL EVERY 4 HOURS PRN
Status: DISCONTINUED | OUTPATIENT
Start: 2024-12-18 | End: 2024-12-20 | Stop reason: HOSPADM

## 2024-12-18 RX ORDER — IPRATROPIUM BROMIDE AND ALBUTEROL SULFATE 2.5; .5 MG/3ML; MG/3ML
3 SOLUTION RESPIRATORY (INHALATION) ONCE
Status: COMPLETED | OUTPATIENT
Start: 2024-12-18 | End: 2024-12-18

## 2024-12-18 RX ORDER — INSULIN LISPRO 100 [IU]/ML
0-10 INJECTION, SOLUTION INTRAVENOUS; SUBCUTANEOUS
Status: DISCONTINUED | OUTPATIENT
Start: 2024-12-18 | End: 2024-12-19

## 2024-12-18 RX ORDER — ENOXAPARIN SODIUM 100 MG/ML
40 INJECTION SUBCUTANEOUS DAILY
Status: DISCONTINUED | OUTPATIENT
Start: 2024-12-18 | End: 2024-12-20 | Stop reason: HOSPADM

## 2024-12-18 RX ORDER — ONDANSETRON 4 MG/1
4 TABLET, ORALLY DISINTEGRATING ORAL EVERY 8 HOURS PRN
Status: DISCONTINUED | OUTPATIENT
Start: 2024-12-18 | End: 2024-12-20 | Stop reason: HOSPADM

## 2024-12-18 RX ORDER — SODIUM CHLORIDE 9 MG/ML
100 INJECTION, SOLUTION INTRAVENOUS CONTINUOUS
Status: DISCONTINUED | OUTPATIENT
Start: 2024-12-18 | End: 2024-12-19

## 2024-12-18 RX ORDER — IBUPROFEN 200 MG
1 TABLET ORAL DAILY
Status: DISCONTINUED | OUTPATIENT
Start: 2024-12-18 | End: 2024-12-20 | Stop reason: HOSPADM

## 2024-12-18 RX ORDER — CYCLOBENZAPRINE HCL 10 MG
.5-1 TABLET ORAL NIGHTLY PRN
COMMUNITY

## 2024-12-18 RX ORDER — IPRATROPIUM BROMIDE AND ALBUTEROL SULFATE 2.5; .5 MG/3ML; MG/3ML
3 SOLUTION RESPIRATORY (INHALATION)
Status: DISCONTINUED | OUTPATIENT
Start: 2024-12-18 | End: 2024-12-18

## 2024-12-18 RX ORDER — CYCLOBENZAPRINE HCL 5 MG
5 TABLET ORAL NIGHTLY PRN
Status: DISCONTINUED | OUTPATIENT
Start: 2024-12-18 | End: 2024-12-20 | Stop reason: HOSPADM

## 2024-12-18 RX ORDER — ACETAMINOPHEN 160 MG/5ML
650 SOLUTION ORAL EVERY 4 HOURS PRN
Status: DISCONTINUED | OUTPATIENT
Start: 2024-12-18 | End: 2024-12-20 | Stop reason: HOSPADM

## 2024-12-18 RX ORDER — CEFTRIAXONE 2 G/50ML
2 INJECTION, SOLUTION INTRAVENOUS ONCE
Status: COMPLETED | OUTPATIENT
Start: 2024-12-18 | End: 2024-12-18

## 2024-12-18 RX ORDER — CLOPIDOGREL BISULFATE 75 MG/1
75 TABLET ORAL DAILY
Status: DISCONTINUED | OUTPATIENT
Start: 2024-12-18 | End: 2024-12-20 | Stop reason: HOSPADM

## 2024-12-18 RX ORDER — HYDROXYZINE HYDROCHLORIDE 25 MG/1
25 TABLET, FILM COATED ORAL EVERY 4 HOURS PRN
Status: DISCONTINUED | OUTPATIENT
Start: 2024-12-18 | End: 2024-12-20 | Stop reason: HOSPADM

## 2024-12-18 RX ORDER — PANTOPRAZOLE SODIUM 20 MG/1
20 TABLET, DELAYED RELEASE ORAL DAILY
Status: DISCONTINUED | OUTPATIENT
Start: 2024-12-18 | End: 2024-12-20 | Stop reason: HOSPADM

## 2024-12-18 RX ORDER — HYDROCODONE BITARTRATE AND HOMATROPINE METHYLBROMIDE ORAL SOLUTION 5; 1.5 MG/5ML; MG/5ML
5 LIQUID ORAL EVERY 6 HOURS PRN
Status: DISCONTINUED | OUTPATIENT
Start: 2024-12-18 | End: 2024-12-20 | Stop reason: HOSPADM

## 2024-12-18 RX ORDER — IPRATROPIUM BROMIDE AND ALBUTEROL SULFATE 2.5; .5 MG/3ML; MG/3ML
3 SOLUTION RESPIRATORY (INHALATION) EVERY 2 HOUR PRN
Status: DISCONTINUED | OUTPATIENT
Start: 2024-12-18 | End: 2024-12-20 | Stop reason: HOSPADM

## 2024-12-18 RX ORDER — IPRATROPIUM BROMIDE AND ALBUTEROL SULFATE 2.5; .5 MG/3ML; MG/3ML
3 SOLUTION RESPIRATORY (INHALATION)
Status: DISCONTINUED | OUTPATIENT
Start: 2024-12-19 | End: 2024-12-20 | Stop reason: HOSPADM

## 2024-12-18 RX ORDER — ACETAMINOPHEN 500 MG
5 TABLET ORAL NIGHTLY PRN
Status: DISCONTINUED | OUTPATIENT
Start: 2024-12-18 | End: 2024-12-20 | Stop reason: HOSPADM

## 2024-12-18 RX ORDER — ONDANSETRON HYDROCHLORIDE 2 MG/ML
4 INJECTION, SOLUTION INTRAVENOUS EVERY 8 HOURS PRN
Status: DISCONTINUED | OUTPATIENT
Start: 2024-12-18 | End: 2024-12-20 | Stop reason: HOSPADM

## 2024-12-18 RX ORDER — ACETAMINOPHEN 325 MG/1
650 TABLET ORAL EVERY 4 HOURS PRN
Status: DISCONTINUED | OUTPATIENT
Start: 2024-12-18 | End: 2024-12-20 | Stop reason: HOSPADM

## 2024-12-18 SDOH — SOCIAL STABILITY: SOCIAL INSECURITY: DO YOU FEEL ANYONE HAS EXPLOITED OR TAKEN ADVANTAGE OF YOU FINANCIALLY OR OF YOUR PERSONAL PROPERTY?: NO

## 2024-12-18 SDOH — SOCIAL STABILITY: SOCIAL INSECURITY: WITHIN THE LAST YEAR, HAVE YOU BEEN AFRAID OF YOUR PARTNER OR EX-PARTNER?: NO

## 2024-12-18 SDOH — ECONOMIC STABILITY: FOOD INSECURITY: WITHIN THE PAST 12 MONTHS, THE FOOD YOU BOUGHT JUST DIDN'T LAST AND YOU DIDN'T HAVE MONEY TO GET MORE.: SOMETIMES TRUE

## 2024-12-18 SDOH — SOCIAL STABILITY: SOCIAL INSECURITY: HAVE YOU HAD THOUGHTS OF HARMING ANYONE ELSE?: NO

## 2024-12-18 SDOH — ECONOMIC STABILITY: HOUSING INSECURITY: AT ANY TIME IN THE PAST 12 MONTHS, WERE YOU HOMELESS OR LIVING IN A SHELTER (INCLUDING NOW)?: NO

## 2024-12-18 SDOH — SOCIAL STABILITY: SOCIAL INSECURITY: WERE YOU ABLE TO COMPLETE ALL THE BEHAVIORAL HEALTH SCREENINGS?: YES

## 2024-12-18 SDOH — ECONOMIC STABILITY: INCOME INSECURITY: IN THE PAST 12 MONTHS HAS THE ELECTRIC, GAS, OIL, OR WATER COMPANY THREATENED TO SHUT OFF SERVICES IN YOUR HOME?: NO

## 2024-12-18 SDOH — SOCIAL STABILITY: SOCIAL INSECURITY: DO YOU FEEL UNSAFE GOING BACK TO THE PLACE WHERE YOU ARE LIVING?: NO

## 2024-12-18 SDOH — ECONOMIC STABILITY: HOUSING INSECURITY: IN THE LAST 12 MONTHS, WAS THERE A TIME WHEN YOU WERE NOT ABLE TO PAY THE MORTGAGE OR RENT ON TIME?: YES

## 2024-12-18 SDOH — SOCIAL STABILITY: SOCIAL INSECURITY: DOES ANYONE TRY TO KEEP YOU FROM HAVING/CONTACTING OTHER FRIENDS OR DOING THINGS OUTSIDE YOUR HOME?: NO

## 2024-12-18 SDOH — SOCIAL STABILITY: SOCIAL INSECURITY: HAS ANYONE EVER THREATENED TO HURT YOUR FAMILY OR YOUR PETS?: NO

## 2024-12-18 SDOH — SOCIAL STABILITY: SOCIAL INSECURITY: WITHIN THE LAST YEAR, HAVE YOU BEEN HUMILIATED OR EMOTIONALLY ABUSED IN OTHER WAYS BY YOUR PARTNER OR EX-PARTNER?: NO

## 2024-12-18 SDOH — SOCIAL STABILITY: SOCIAL INSECURITY: ARE THERE ANY APPARENT SIGNS OF INJURIES/BEHAVIORS THAT COULD BE RELATED TO ABUSE/NEGLECT?: NO

## 2024-12-18 SDOH — SOCIAL STABILITY: SOCIAL INSECURITY: ABUSE: ADULT

## 2024-12-18 SDOH — ECONOMIC STABILITY: FOOD INSECURITY: HOW HARD IS IT FOR YOU TO PAY FOR THE VERY BASICS LIKE FOOD, HOUSING, MEDICAL CARE, AND HEATING?: SOMEWHAT HARD

## 2024-12-18 SDOH — ECONOMIC STABILITY: TRANSPORTATION INSECURITY: IN THE PAST 12 MONTHS, HAS LACK OF TRANSPORTATION KEPT YOU FROM MEDICAL APPOINTMENTS OR FROM GETTING MEDICATIONS?: NO

## 2024-12-18 SDOH — SOCIAL STABILITY: SOCIAL INSECURITY: ARE YOU OR HAVE YOU BEEN THREATENED OR ABUSED PHYSICALLY, EMOTIONALLY, OR SEXUALLY BY ANYONE?: NO

## 2024-12-18 SDOH — ECONOMIC STABILITY: FOOD INSECURITY
WITHIN THE PAST 12 MONTHS, YOU WORRIED THAT YOUR FOOD WOULD RUN OUT BEFORE YOU GOT THE MONEY TO BUY MORE.: SOMETIMES TRUE

## 2024-12-18 SDOH — SOCIAL STABILITY: SOCIAL INSECURITY: HAVE YOU HAD ANY THOUGHTS OF HARMING ANYONE ELSE?: NO

## 2024-12-18 SDOH — ECONOMIC STABILITY: HOUSING INSECURITY: IN THE PAST 12 MONTHS, HOW MANY TIMES HAVE YOU MOVED WHERE YOU WERE LIVING?: 1

## 2024-12-18 ASSESSMENT — ACTIVITIES OF DAILY LIVING (ADL)
DRESSING YOURSELF: INDEPENDENT
TOILETING: INDEPENDENT
LACK_OF_TRANSPORTATION: NO
GROOMING: INDEPENDENT
FEEDING YOURSELF: INDEPENDENT
JUDGMENT_ADEQUATE_SAFELY_COMPLETE_DAILY_ACTIVITIES: YES
PATIENT'S MEMORY ADEQUATE TO SAFELY COMPLETE DAILY ACTIVITIES?: YES
WALKS IN HOME: INDEPENDENT
HEARING - LEFT EAR: FUNCTIONAL
BATHING: INDEPENDENT
HEARING - RIGHT EAR: FUNCTIONAL
ADEQUATE_TO_COMPLETE_ADL: YES
LACK_OF_TRANSPORTATION: NO

## 2024-12-18 ASSESSMENT — COLUMBIA-SUICIDE SEVERITY RATING SCALE - C-SSRS
1. IN THE PAST MONTH, HAVE YOU WISHED YOU WERE DEAD OR WISHED YOU COULD GO TO SLEEP AND NOT WAKE UP?: NO
2. HAVE YOU ACTUALLY HAD ANY THOUGHTS OF KILLING YOURSELF?: NO
6. HAVE YOU EVER DONE ANYTHING, STARTED TO DO ANYTHING, OR PREPARED TO DO ANYTHING TO END YOUR LIFE?: NO

## 2024-12-18 ASSESSMENT — PAIN SCALES - GENERAL
PAINLEVEL_OUTOF10: 0 - NO PAIN

## 2024-12-18 ASSESSMENT — LIFESTYLE VARIABLES
HAVE YOU EVER FELT YOU SHOULD CUT DOWN ON YOUR DRINKING: NO
AUDIT-C TOTAL SCORE: 0
TOTAL SCORE: 0
SKIP TO QUESTIONS 9-10: 1
HOW MANY STANDARD DRINKS CONTAINING ALCOHOL DO YOU HAVE ON A TYPICAL DAY: PATIENT DOES NOT DRINK
EVER FELT BAD OR GUILTY ABOUT YOUR DRINKING: NO
HAVE PEOPLE ANNOYED YOU BY CRITICIZING YOUR DRINKING: NO
HOW OFTEN DO YOU HAVE 6 OR MORE DRINKS ON ONE OCCASION: NEVER
EVER HAD A DRINK FIRST THING IN THE MORNING TO STEADY YOUR NERVES TO GET RID OF A HANGOVER: NO
HOW OFTEN DO YOU HAVE A DRINK CONTAINING ALCOHOL: NEVER
AUDIT-C TOTAL SCORE: 0

## 2024-12-18 ASSESSMENT — COGNITIVE AND FUNCTIONAL STATUS - GENERAL
CLIMB 3 TO 5 STEPS WITH RAILING: A LITTLE
MOBILITY SCORE: 23
PATIENT BASELINE BEDBOUND: NO
DAILY ACTIVITIY SCORE: 24

## 2024-12-18 ASSESSMENT — PAIN SCALES - WONG BAKER: WONGBAKER_NUMERICALRESPONSE: NO HURT

## 2024-12-18 ASSESSMENT — PATIENT HEALTH QUESTIONNAIRE - PHQ9
SUM OF ALL RESPONSES TO PHQ9 QUESTIONS 1 & 2: 0
2. FEELING DOWN, DEPRESSED OR HOPELESS: NOT AT ALL
1. LITTLE INTEREST OR PLEASURE IN DOING THINGS: NOT AT ALL

## 2024-12-18 ASSESSMENT — PAIN DESCRIPTION - PROGRESSION: CLINICAL_PROGRESSION: NOT CHANGED

## 2024-12-18 ASSESSMENT — PAIN - FUNCTIONAL ASSESSMENT: PAIN_FUNCTIONAL_ASSESSMENT: 0-10

## 2024-12-18 NOTE — ASSESSMENT & PLAN NOTE
Secondary to atypical pneumonia with possible underlying lung disease; PFTs pending  Currently on 4 L.  Wean oxygen as able.

## 2024-12-18 NOTE — PROGRESS NOTES
Occupational Therapy                 Therapy Communication Note    Patient Name: Alanna Hickman  MRN: [id number]  Department[facility]  [facility]  Today's Date: 12/18/2024     Discipline: Occupational Therapy    OT Missed Visit: Yes     Missed Visit Reason: Missed Visit Reason: Cancel    Missed Time: Cancel    Comment:  Patient is a cancel due to somnolence s/p ketamine administration

## 2024-12-18 NOTE — ED TRIAGE NOTES
Pt presents EMS for SOB. Pt left AMA from this hospital last night after being admitted on hi-flow o2 for 9 days. Pt states she was sent home with an oxygen tank that was empty. Pt also states a nurse was supposed to bring supplies over and never showed up. Pt 94% on RA on arrival to ED. Pt was given 1 duoneb by EMS

## 2024-12-18 NOTE — CONSULTS
Pulmonary Consultation Note   Subjective    Alanna Hickman is a 44 y.o. year old female patient known with asthma, CVA on plavix, DM, smoker, possible spondyloarthropathy, severe SANGEETA, prescribed PAP therapy but had trouble with machine admitted on 12/18/2024 with recurrent dyspnea, hypoxemia.    History of Present Illness:  No significant change from progress note from yesterday. Patient left AMA and presented back with dyspnea and hypoxemia. Once stable on the floor, was essentially back on same O2 as yesterday. No other acute complaints.    Past Medical History  She has a past medical history of Bilateral hand pain and Type 2 diabetes mellitus.    Surgical History  She has a past surgical history that includes MR angio head wo IV contrast (6/16/2022).     Social History  She reports that she has been smoking cigarettes. She started smoking about 24 years ago. She has a 25.6 pack-year smoking history. She has been exposed to tobacco smoke. She has never used smokeless tobacco. She reports that she does not currently use alcohol. She reports that she does not use drugs.      Family History  Family History   Problem Relation Name Age of Onset   • Arthritis Mother     • Hypertension Mother     • Other (other conditons influening health) Mother     • Heart failure Father     • Heart attack Father     • Diabetes type II Father     • Other (severe back pain) Sister     • Other (?OD) Sister          uncertain   • Coronary artery disease Brother     • Heart attack Brother     • Other (back pain) Brother     • Diabetes type II Brother     • Other (bladder cancer) Brother     • Other (Hunchback arthritis) Paternal Grandmother     • Breast cancer Mother's Sister          Allergies  Dulaglutide    Review of Systems         Meds    Scheduled medications  enoxaparin, 40 mg, subcutaneous, Daily  insulin lispro, 0-10 Units, subcutaneous, TID AC  ipratropium-albuteroL, 3 mL, nebulization, q6h  oxygen, , inhalation, Continuous -  "Inhalation      Continuous medications  sodium chloride 0.9%, 100 mL/hr      PRN medications  PRN medications: acetaminophen **OR** acetaminophen **OR** acetaminophen, benzocaine-menthol, ipratropium-albuteroL, melatonin, ondansetron ODT **OR** ondansetron, oxygen     Objective      Last Recorded Vitals  /78   Pulse 84   Temp 36.8 °C (98.3 °F) (Oral)   Resp 17   Wt 107 kg (235 lb)   SpO2 (!) 92%     Blood pressure 136/78, pulse 84, temperature 36.8 °C (98.3 °F), temperature source Oral, resp. rate 17, height 1.702 m (5' 7\"), weight 107 kg (235 lb), SpO2 (!) 92%.   Physical Exam   GENERAL: normal appearance. well nourished. No respiratory distress  HEAD/SINUSES: no sinus tenderness  OROPHARYNX: MMM  NECK: no JVD, midline trachea without stridor. Thyroid not enlarged  LYMPH NODES : none felt in the cervical, submandibular or supraclavicular regions  LUNGS: Symmetric chest. Good excursion. Equal breath sounds. no wheezing. no crackles or rhonchi  CARDIAC: normal S1 and S2; no gallops, rubs or murmurs. Regular rate and rhythm  EXTREMITIES: No edema, no varicose veins  NEURO: grossly normal mental status, CN reflexes and motor strength.   SKIN: Skin turgor normal. No rashes or lesions.   PSYCH: Normal affect    Intake/Output Summary (Last 24 hours) at 12/18/2024 1016  Last data filed at 12/18/2024 0629  Gross per 24 hour   Intake 1408.78 ml   Output --   Net 1408.78 ml     Labs:   Results from last 72 hours   Lab Units 12/18/24  0354 12/17/24  0455 12/16/24  0509   SODIUM mmol/L 133* 136 136   POTASSIUM mmol/L 4.1 3.9 4.1   CHLORIDE mmol/L 96* 100 100   CO2 mmol/L 30 31 28   BUN mg/dL 18 20 21   CREATININE mg/dL 0.47* 0.41* 0.46*   GLUCOSE mg/dL 257* 262* 229*   CALCIUM mg/dL 9.6 9.1 9.7   ANION GAP mmol/L 11 9* 12   EGFR mL/min/1.73m*2 >90 >90 >90      Results from last 72 hours   Lab Units 12/18/24  0354   WBC AUTO x10*3/uL 9.7   HEMOGLOBIN g/dL 16.8*   HEMATOCRIT % 50.1*   PLATELETS AUTO x10*3/uL 372 "   NEUTROS PCT AUTO % 73.9   LYMPHS PCT AUTO % 18.9   MONOS PCT AUTO % 4.8   EOS PCT AUTO % 1.5          Results from last 72 hours   Lab Units 12/18/24  0354   POCT PH, VENOUS pH 7.39   POCT PCO2, VENOUS mm Hg 59*   POCT PO2, VENOUS mm Hg 21*      Micro/ID:   Lab Results   Component Value Date    URINECULTURE No significant growth 11/20/2023    BLOODCULT Loaded on Instrument - Culture in progress 12/18/2024    BLOODCULT Loaded on Instrument - Culture in progress 12/18/2024     Summary of key imaging results from the last 24 hours  CTA chest (12/18/24)  IMPRESSION:  No evidence of acute pulmonary embolism.  Patchy bilateral consolidation, likely infectious/inflammatory in  etiology, which appears slightly improved in the lower lobes but  worsened in the right upper lobe compared to prior.    Impression   Alanna Hickman is a 44 y.o. year old female patient is being seen by the pulmonary service for   Acute hypoxemic respiratory failure - Due to GGO and nodular infiltrates seen on CT, due to resolving atypical PNA versus other infectious/inflammatory process. Overall, repeat CT looks improved.  Severe SANGEETA - Never tolerated or stabilized on PAP due to interface intolerance.    Recommendations   As follows:  Would continue equivalent of prednisone 40 mg daily or methylprednisolone 40 mg daily  CPAP at night or with sleep as she tolerates  Still consideration of bronchoscopy if no improvement on steroids; patient is improving, just needs more time  Pulmonology will follow    Andrade Aguirre MD   12/18/24 at 10:16 AM     -Only the Medical problems listed under impression were addressed today.   -Please contact primary team for all other concerns and medical problem  -Thank you for your consult     Disclaimer: Documentation completed with the information available at the time of input. Parts of this note may have been scribed or generated using voice dictation software, Dragon.  Homophonic errors may exist.  Please  contact me directly if clarification is needed. The times in the chart may not be reflective of actual patient care times, interventions, or procedures. Documentation occurs after the physical care of the patient.

## 2024-12-18 NOTE — PROGRESS NOTES
Pharmacy Medication History Review    Alanna Hickman is a 44 y.o. female admitted for Acute hypoxic respiratory failure (Multi). Pharmacy reviewed the patient's [id number] medications and allergies for accuracy.    Medications ADDED:  Cyclobenzaprine 10 mg tablet  Medications CHANGED:  None  Medications REMOVED:   Cefuroxime (Ceftin) 500 mg tablet    The list below reflects the updated PTA list. Comments regarding how patient may be taking medications differently can be found in the Admit Orders Activity  Prior to Admission Medications   Prescriptions Last Dose Informant   Farxiga 10 mg 12/17/2024 Morning Self   Sig: TAKE ONE TABLET BY MOUTH ONCE DAILY   albuterol (Ventolin HFA) 90 mcg/actuation inhaler PRN Self   Sig: Inhale 2 puffs every 6 hours if needed for wheezing.   clopidogrel (Plavix) 75 mg tablet 12/17/2024 Morning Self   Sig: Take 1 tablet (75 mg) by mouth once daily.   cyclobenzaprine (Flexeril) 10 mg tablet PRN Self   Sig: Take 0.5-1 tablets (5-10 mg) by mouth as needed   at bedtime for muscle spasms.      insulin degludec (Tresiba FlexTouch U-100) 100 unit/mL (3 mL) injection 12/17/2024 Bedtime Self   Sig: Inject 40 Units under the skin once daily at bedtime.   Take as directed per insulin instructions.      metFORMIN  mg 24 hr tablet 12/17/2024 Self   Sig: TAKE 3 TABLETS BY MOUTH EVERY DAY WITH MEALS   nicotine (Nicoderm CQ) 21 mg/24 hr patch  Self   Sig: Place 1 patch over 24 hours on the skin once every   24 hours    pantoprazole (ProtoNix) 20 mg EC tablet 12/17/2024 Morning Self   Sig: Take 1 tablet (20 mg) by mouth once daily in the   morning. Take before meals. Do not crush, chew, or split.   predniSONE (Deltasone) 20 mg tablet  Self   Sig: Take 2 tablets (40 mg) by mouth once daily for 3 days,  THEN 1.5 tablets (30 mg) once daily for 3 days, THEN   1 tablet (20 mg) once daily for 3 days, THEN 0.5 tablets (10 mg)  once daily for 3 days.  **Patient states she has not started this  medication at home yet**      Facility-Administered Medications: None        The list below reflects the updated allergy list. Please review each documented allergy for additional clarification and justification.  Allergies  Reviewed by Jordan Bethea on 12/18/2024        Severity Reactions Comments    Dulaglutide High Other Pancreatis            Patient accepts M2B at discharge. Pharmacy has been updated to [facility].    Sources used to complete the med history include:  Patient interviewed about medications alert, cooperative, pleasant, knew her medications and doses  Epic Dispense History reviewed  Care Everywhere reviewed  Chart Review History reviewed    Below are additional concerns with the patient's PTA list.  None    Jordan Bethea, Clint  Please reach out via [facility] Secure Chat for questions

## 2024-12-18 NOTE — ED PROVIDER NOTES
Care endorsed to me by Dr. Wright pending plan for admission for hypoxic respiratory failure secondary to mycoplasma pneumonia.She is currently requiring 30 L 50% high flow nasal cannula oxygen therefore attempted to find ICU bed available and there are no available beds in the region although transfer center reports there is 1 bed available here at [facility].  ICU consulted for evaluation of the patient.  We were able to wean to 4 L nasal cannula and she is improved after DuoNebs x 2.  ICU does feel at this time she is stable for stepdown but can see it with pulmonary on consult.  Will admit to hospitalist for further management.    ED Course as of 12/18/24 0751   Wed Dec 18, 2024   0443 WBC: 9.7  Septic workup initiated for tachycardia and tachypnea and the setting of known pneumonia.  Previous hospitalization records shows she tested positive for mycoplasma, covered with Rocephin azithromycin [AS]   0456 EKG performed at 0 336 and read by me shows sinus tachycardia, 102 bpm, normal CA interval, normal QRS, QTc prolongation of 484 is borderline.  She has a left axis deviation, and poor R wave progression [AS]   0528 Initially very wet sounding on exam, ordered BiPAP for her tachypnea and borderline oxygen status, however she was unable to tolerate the BiPAP, ordered dose of some dissociative pain dose ketamine, patient transition to high flow, she remains significantly anxious, but is had a decrease in her work of breathing, and lowered respiratory rate, and her oxygenation is improving.  CT was able to be performed at that time, which showed residual infiltrates, but no large pleural effusions, and no filling defects.  She has a small amount of CO2 retention which is likely chronic. [AS]   0638 Transfer center notes there are no available ICU beds in the region [ONE[place]]   0644 Consulted ICU  [ONEIDA]   0750 Dr. Link, ICU evaluated the patient we were able to wean to 4 L nasal cannula oxygen and he does feel she is  appropriate for stepdown with pulmonary consult. [ONEIDA]      ED Course User Index  [AS] Nancy Wright DO  [ONEIDA] Carlyn Harrison MD         Diagnoses as of 12/18/24 0751   Acute hypoxic respiratory failure (Multi)   Pneumonia of both lungs due to infectious organism, unspecified part of lung          Carlyn Harrison MD  12/18/24 0751

## 2024-12-18 NOTE — ED PROVIDER NOTES
EMERGENCY DEPARTMENT ENCOUNTER      Pt Name: Alanna Hickman  MRN: [id number]  Birthdate 1980  Date of evaluation: 12/18/2024  Provider: Nancy Wright DO         Chief Complaint   Patient presents with    Shortness of Breath       HPI     44-year-old female who was recently admitted for multiple days in a row for pneumonia with hypoxia requiring positive pressure and high flow, who left AGAINST MEDICAL ADVICE yesterday, return to the emergency department with shortness of breath.  States that she did not get the oxygen set up at home, and she was unable to manage her dyspnea.               Patient History   Past Medical History:   Diagnosis Date    Bilateral hand pain     Type 2 diabetes mellitus      Past Surgical History:   Procedure Laterality Date    MR HEAD ANGIO WO IV CONTRAST  6/16/2022    MR HEAD ANGIO WO IV CONTRAST [facility]     Family History   Problem Relation Name Age of Onset    Arthritis Mother      Hypertension Mother      Other (other conditons influening health) Mother      Heart failure Father      Heart attack Father      Diabetes type II Father      Other (severe back pain) Sister      Other (?OD) Sister          uncertain    Coronary artery disease Brother      Heart attack Brother      Other (back pain) Brother      Diabetes type II Brother      Other (bladder cancer) Brother      Other (Hunchback arthritis) Paternal Grandmother      Breast cancer Mother's Sister       Social History     Tobacco Use    Smoking status: Every Day     Current packs/day: 1.00     Average packs/day: 1 pack/day for 25.6 years (25.6 ttl pk-yrs)     Types: Cigarettes     Start date: 1/1/2000     Passive exposure: Current    Smokeless tobacco: Never   Vaping Use    Vaping status: Never Used   Substance Use Topics    Alcohol use: Not Currently    Drug use: Never       Physical Exam   ED Triage Vitals [12/18/24 0338]   Temperature Heart Rate Respirations BP   36.8 °C (98.3 °F) 100 (!) 24 102/71       Pulse Ox Temp Source Heart Rate Source Patient Position   94 % Oral Monitor --      BP Location FiO2 (%)     -- --       Physical Exam  Vitals and nursing note reviewed.   Constitutional:       General: She is in acute distress (Respiratory).      Appearance: She is well-developed.   HENT:      Head: Normocephalic and atraumatic.   Eyes:      Conjunctiva/sclera: Conjunctivae normal.   Cardiovascular:      Rate and Rhythm: Regular rhythm. Tachycardia present.   Pulmonary:      Effort: Tachypnea, accessory muscle usage and respiratory distress present.      Breath sounds: Examination of the right-upper field reveals decreased breath sounds. Examination of the left-upper field reveals rhonchi. Examination of the right-middle field reveals decreased breath sounds. Examination of the left-middle field reveals rhonchi. Examination of the right-lower field reveals decreased breath sounds. Examination of the left-lower field reveals decreased breath sounds. Decreased breath sounds and rhonchi present.   Abdominal:      Palpations: Abdomen is soft.      Tenderness: There is no abdominal tenderness.   Musculoskeletal:         General: No swelling.      Cervical back: Neck supple.   Skin:     General: Skin is warm and dry.      Capillary Refill: Capillary refill takes less than 2 seconds.      Coloration: Skin is pale.   Neurological:      General: No focal deficit present.      Mental Status: She is alert.   Psychiatric:         Mood and Affect: Mood is anxious.         Behavior: Behavior is agitated.         Results:  Abnormal Labs Reviewed   BLOOD GAS VENOUS FULL PANEL - Abnormal; Notable for the following components:       Result Value    POCT pCO2, Venous 59 (*)     POCT pO2, Venous 21 (*)     POCT SO2, Venous 32 (*)     POCT Oxy Hemoglobin, Venous 31.6 (*)     POCT Sodium, Venous 133 (*)     POCT Chloride, Venous 95 (*)     POCT Glucose, Venous 279 (*)     POCT Base Excess, Venous 8.3 (*)     POCT HCO3 Calculated,  Venous 35.7 (*)     POCT Anion Gap, Venous 6.0 (*)     All other components within normal limits   COMPREHENSIVE METABOLIC PANEL - Abnormal; Notable for the following components:    Glucose 257 (*)     Sodium 133 (*)     Chloride 96 (*)     Creatinine 0.47 (*)     ALT 64 (*)     All other components within normal limits   CBC WITH AUTO DIFFERENTIAL - Abnormal; Notable for the following components:    RBC 5.24 (*)     Hemoglobin 16.8 (*)     Hematocrit 50.1 (*)     All other components within normal limits   RENAL FUNCTION PANEL - Abnormal; Notable for the following components:    Glucose 241 (*)     Creatinine 0.34 (*)     Albumin 3.2 (*)     All other components within normal limits   CBC WITH AUTO DIFFERENTIAL - Abnormal; Notable for the following components:    Lymphocytes Absolute 0.75 (*)     All other components within normal limits   SHARA-WITH REFLEX TO NORMA - Abnormal; Notable for the following components:    SHARA Positive (*)     All other components within normal limits   ALLERGEN, RESPIRATORY PROFILE IGE - Abnormal; Notable for the following components:    Box-Elder IgE 0.13 (*)     Elm IgE 0.13 (*)     [place] IgE 0.18 (*)     All other components within normal limits    Narrative:     Interpretation Scale  <0.10 kU/L - Class 0 Allergen: ABSENT OR UNDETECTABLE ALLERGEN SPECIFIC IgE  0.10-0.34 kU/L - Class 0/1 Allergen: EQUIVOCAL LEVEL OF ALLERGEN SPECIFIC IgE  0.35-0.69 kU/L - Class 1 Allergen: LOW LEVEL OF ALLERGEN SPECIFIC IgE  0.70-3.49 kU/L - Class 2 Allergen: MODERATE LEVEL OF ALLERGEN SPECIFIC IgE  3.50-17.49 kU/L - Class 3 Allergen: HIGH LEVEL OF ALLERGEN SPECIFIC IgE  17.50-49.99 kU/L - Class 4 Allergen: VERY HIGH LEVEL OF ALLERGEN SPECIFIC IgE  50..00 kU/L - Class 5 Allergen: ULTRA HIGH LEVEL OF ALLERGEN SPECIFIC IgE  >100.00 kU/L - Class 6 Allergen: EXTREMELY HIGH LEVEL OF ALLERGEN SPECIFIC IgE   SEDIMENTATION RATE, AUTOMATED - Abnormal; Notable for the following components:     Sedimentation Rate 59 (*)     All other components within normal limits   C-REACTIVE PROTEIN - Abnormal; Notable for the following components:    C-Reactive Protein 2.51 (*)     All other components within normal limits   RENAL FUNCTION PANEL - Abnormal; Notable for the following components:    Glucose 214 (*)     Creatinine 0.45 (*)     Albumin 3.0 (*)     All other components within normal limits   POCT GLUCOSE - Abnormal; Notable for the following components:    POCT Glucose 259 (*)     All other components within normal limits   POCT GLUCOSE - Abnormal; Notable for the following components:    POCT Glucose 344 (*)     All other components within normal limits   POCT GLUCOSE - Abnormal; Notable for the following components:    POCT Glucose 352 (*)     All other components within normal limits   POCT GLUCOSE - Abnormal; Notable for the following components:    POCT Glucose 226 (*)     All other components within normal limits   POCT GLUCOSE - Abnormal; Notable for the following components:    POCT Glucose 341 (*)     All other components within normal limits   POCT GLUCOSE - Abnormal; Notable for the following components:    POCT Glucose 383 (*)     All other components within normal limits   POCT GLUCOSE - Abnormal; Notable for the following components:    POCT Glucose 433 (*)     All other components within normal limits   POCT GLUCOSE - Abnormal; Notable for the following components:    POCT Glucose 404 (*)     All other components within normal limits   POCT GLUCOSE - Abnormal; Notable for the following components:    POCT Glucose 298 (*)     All other components within normal limits   POCT GLUCOSE - Abnormal; Notable for the following components:    POCT Glucose 262 (*)     All other components within normal limits   POCT GLUCOSE - Abnormal; Notable for the following components:    POCT Glucose 223 (*)     All other components within normal limits   POCT GLUCOSE - Abnormal; Notable for the following  components:    POCT Glucose 486 (*)     All other components within normal limits   POCT GLUCOSE - Abnormal; Notable for the following components:    POCT Glucose 257 (*)     All other components within normal limits   POCT GLUCOSE - Abnormal; Notable for the following components:    POCT Glucose 372 (*)     All other components within normal limits       All other labs were normal or not returned as of the time of this dictation.     CT angio chest for pulmonary embolism   Final Result   No evidence of acute pulmonary embolism.   Patchy bilateral consolidation, likely infectious/inflammatory in   etiology, which appears slightly improved in the lower lobes but   worsened in the right upper lobe compared to prior.   Signed by Justice Menard MD      XR chest 1 view   Final Result   1.  No definite evidence of acute cardiopulmonary process.                  MACRO:   None        Signed by: Steve Robles 12/18/2024 4:19 AM   Dictation workstation:   [id number]            ED Course & MDM   Alanna Hickman is a 44 y.o. female presenting to the ED for evaluation of had concerns including Shortness of Breath.. External records reviewed: I reviewed external records including outpatient, PCP records, and prior discharge summaries.    Medical Decision Making  Initially tachycardic and tachypneic in the setting of known mycoplasma pneumonia infection, initiated covered with Rocephin azithromycin, patient is also agitated, oxygenation is borderline.  She has significantly increased work of breathing.  Suspect she would benefit from BiPAP and this was ordered, however she has been unable to tolerate it.  CT PE study was ordered as well, which shows ongoing multifocal interstitial infiltrates, but no signs of embolism.  Ordered some dissociative dose ketamine for helping to manage her anxiety and discomfort, however transition to high flow which she is tolerating well.  She does have some chronic CO2 retention however her bicarb  is normal at 30, this is unlikely to represent an acute decompensation at this time.  Given her oxygen requirements at this time, she will likely require ICU admission.  Discussed with the oncoming provider, Dr. Harrison, and care was handed over to her, pending admission for pneumonia with hypoxic respiratory failure.    Approximately 35 minutes of critical care time were spent in the management of this patient, in obtaining history, examining the patient, ordering and performing interventions, reviewing and interpreting test results and assessing response to interventions.  Critical care time was necessary to prevent deterioration from pneumonia with hypoxic respiratory failure, as well as assessment for SIRS and sepsis criteria        ED Course as of 12/22/24 0337   Wed Dec 18, 2024   0443 WBC: 9.7  Septic workup initiated for tachycardia and tachypnea and the setting of known pneumonia.  Previous hospitalization records shows she tested positive for mycoplasma, covered with Rocephin azithromycin [AS]   0456 EKG performed at 0 336 and read by me shows sinus tachycardia, 102 bpm, normal OK interval, normal QRS, QTc prolongation of 484 is borderline.  She has a left axis deviation, and poor R wave progression [AS]   0528 Initially very wet sounding on exam, ordered BiPAP for her tachypnea and borderline oxygen status, however she was unable to tolerate the BiPAP, ordered dose of some dissociative pain dose ketamine, patient transition to high flow, she remains significantly anxious, but is had a decrease in her work of breathing, and lowered respiratory rate, and her oxygenation is improving.  CT was able to be performed at that time, which showed residual infiltrates, but no large pleural effusions, and no filling defects.  She has a small amount of CO2 retention which is likely chronic. [AS]   0638 Transfer center notes there are no available ICU beds in the region [[facility]]   0644 Consulted ICU  [ONEIDA]   0756   Fariba, ICU evaluated the patient we were able to wean to 4 L nasal cannula oxygen and he does feel she is appropriate for stepdown with pulmonary consult. [[place]]      ED Course User Index  [AS] Nancy Wright DO  [ONEIDA] Carlyn Harrison MD         Diagnoses as of 12/22/24 0337   Acute hypoxic respiratory failure (Multi)   Pneumonia of both lungs due to infectious organism, unspecified part of lung           Procedure  Procedures            Nancy Wright DO  Emergency Medicine    The above documentation was completed with the use of speech recognition software. It may contain dictation errors secondary to limitations of the software.      ED Medications administered this visit:    Medications   sodium chloride 0.9 % bolus 1,000 mL (0 mL intravenous Stopped 12/18/24 0629)   azithromycin 500 mg in dextrose 5% 250 mL IV (0 mg intravenous Stopped 12/18/24 0528)   cefTRIAXone (Rocephin) 2 g in dextrose (iso) IV 50 mL (0 g intravenous Stopped 12/18/24 0458)   ketamine 22.5 mg in sodium chloride 0.9% 100 mL IV (0 mg intravenous Stopped 12/18/24 0540)   iohexol (OMNIPaque) 350 mg iodine/mL solution 75 mL (75 mL intravenous Given 12/18/24 0504)   ipratropium-albuteroL (Duo-Neb) 0.5-2.5 mg/3 mL nebulizer solution 3 mL (3 mL nebulization Given 12/18/24 0707)   ipratropium-albuteroL (Duo-Neb) 0.5-2.5 mg/3 mL nebulizer solution 3 mL (3 mL nebulization Given 12/18/24 0706)   LORazepam (Ativan) injection 0.5 mg (0.5 mg intravenous Given 12/18/24 2050)   LORazepam (Ativan) injection 0.5 mg (0.5 mg intravenous Given 12/19/24 2136)   insulin glargine (Lantus) injection 10 Units (10 Units subcutaneous Given 12/20/24 0159)       New Prescriptions from this visit:    Discharge Medication List as of 12/20/2024  3:11 PM        START taking these medications    Details   hydrOXYzine HCL (Atarax) 25 mg tablet Take 1 tablet (25 mg) by mouth every 4 hours if needed for anxiety., Starting Fri 12/20/2024, Normal      ipratropium-albuteroL  (Duo-Neb) 0.5-2.5 mg/3 mL nebulizer solution Inhale the contents of 1 vial (3 ml) by nebulization 4 times a day as needed for wheezing or shortness of breath., Starting Fri 12/20/2024, Normal             Final Impression:   1. Acute hypoxic respiratory failure (Multi)    2. Pneumonia of both lungs due to infectious organism, unspecified part of lung    3. Pneumonia of both upper lobes due to Mycoplasma pneumoniae          (Please note that portions of this note were completed with a voice recognition program.  Efforts were made to edit the dictations but occasionally words are mis-transcribed.)     Nancy Wright, DO  12/18/24 0748       Nancy Wright, DO  12/22/24 0336

## 2024-12-18 NOTE — H&P
History Of Present Illness  Alanna Hickman is a 44 y.o. female presenting with shortness of breath.  Patient left AGAINST MEDICAL ADVICE yesterday, she was admitted for 9 days with respiratory failure secondary to mycoplasma pneumonia, this was a complicated course requiring high flow for most of the stay, finally being able to be tapered down to nasal cannula just on the day of her leaving AGAINST MEDICAL ADVICE.  She was sent out with prednisone and with oxygen, but she says that that tank was empty at home that was delivered.  She also says another tank from a family member was also empty.  She was short of breath prompting her come to the emergency department, eventually placed on BiPAP, and interestingly given ketamine; she was able be transition to 4 L nasal cannula.  Currently, the patient is somnolent after receiving ketamine.     Past Medical History  She has a past medical history of Bilateral hand pain and Type 2 diabetes mellitus.    Surgical History  She has a past surgical history that includes MR angio head wo IV contrast (6/16/2022).     Social History  She reports that she has been smoking cigarettes. She started smoking about 24 years ago. She has a 25.6 pack-year smoking history. She has been exposed to tobacco smoke. She has never used smokeless tobacco. She reports that she does not currently use alcohol. She reports that she does not use drugs.    Family History  Family History   Problem Relation Name Age of Onset    Arthritis Mother      Hypertension Mother      Other (other conditons influening health) Mother      Heart failure Father      Heart attack Father      Diabetes type II Father      Other (severe back pain) Sister      Other (?OD) Sister          uncertain    Coronary artery disease Brother      Heart attack Brother      Other (back pain) Brother      Diabetes type II Brother      Other (bladder cancer) Brother      Other (Hunchback arthritis) Paternal Grandmother      Breast  cancer Mother's Sister          Allergies  Dulaglutide    Review of Systems   Unable to perform ROS: Mental status change        Physical Exam  General: Middle-aged female, ill-appearing  Eyes: Clear sclera  Neck: No JVD  cardio: Regularly borderline tachycardic  Respiratory: On nasal cannula.  Appears comfortable currently.  Bilateral diminished consult course.  Skin: Warm dry  Extremities: No lower extreme edema  Psychiatric: Just received ketamine and unable to assess.  Neuro: No facial droop.  Somnolent after ketamine.  Last Recorded Vitals  /86   Pulse 81   Temp 36.8 °C (98.3 °F) (Oral)   Resp 17   Wt 107 kg (235 lb)   SpO2 99%     Relevant Results        Results for orders placed or performed during the hospital encounter of 12/18/24 (from the past 24 hours)   Blood Gas Venous Full Panel   Result Value Ref Range    POCT pH, Venous 7.39 7.33 - 7.43 pH    POCT pCO2, Venous 59 (H) 41 - 51 mm Hg    POCT pO2, Venous 21 (L) 35 - 45 mm Hg    POCT SO2, Venous 32 (L) 45 - 75 %    POCT Oxy Hemoglobin, Venous 31.6 (L) 45.0 - 75.0 %    POCT Hematocrit Calculated, Venous 46.0 36.0 - 46.0 %    POCT Sodium, Venous 133 (L) 136 - 145 mmol/L    POCT Potassium, Venous 3.6 3.5 - 5.3 mmol/L    POCT Chloride, Venous 95 (L) 98 - 107 mmol/L    POCT Ionized Calicum, Venous 1.23 1.10 - 1.33 mmol/L    POCT Glucose, Venous 279 (H) 74 - 99 mg/dL    POCT Lactate, Venous 1.5 0.4 - 2.0 mmol/L    POCT Base Excess, Venous 8.3 (H) -2.0 - 3.0 mmol/L    POCT HCO3 Calculated, Venous 35.7 (H) 22.0 - 26.0 mmol/L    POCT Hemoglobin, Venous 15.4 12.0 - 16.0 g/dL    POCT Anion Gap, Venous 6.0 (L) 10.0 - 25.0 mmol/L    Patient Temperature 37.0 degrees Celsius    FiO2 21 %   Comprehensive Metabolic Panel   Result Value Ref Range    Glucose 257 (H) 74 - 99 mg/dL    Sodium 133 (L) 136 - 145 mmol/L    Potassium 4.1 3.5 - 5.3 mmol/L    Chloride 96 (L) 98 - 107 mmol/L    Bicarbonate 30 21 - 32 mmol/L    Anion Gap 11 10 - 20 mmol/L    Urea Nitrogen  18 6 - 23 mg/dL    Creatinine 0.47 (L) 0.50 - 1.05 mg/dL    eGFR >90 >60 mL/min/1.73m*2    Calcium 9.6 8.6 - 10.3 mg/dL    Albumin 3.5 3.4 - 5.0 g/dL    Alkaline Phosphatase 70 33 - 110 U/L    Total Protein 7.1 6.4 - 8.2 g/dL    AST 28 9 - 39 U/L    Bilirubin, Total 0.4 0.0 - 1.2 mg/dL    ALT 64 (H) 7 - 45 U/L   CBC and Auto Differential   Result Value Ref Range    WBC 9.7 4.4 - 11.3 x10*3/uL    nRBC 0.0 0.0 - 0.0 /100 WBCs    RBC 5.24 (H) 4.00 - 5.20 x10*6/uL    Hemoglobin 16.8 (H) 12.0 - 16.0 g/dL    Hematocrit 50.1 (H) 36.0 - 46.0 %    MCV 96 80 - 100 fL    MCH 32.1 26.0 - 34.0 pg    MCHC 33.5 32.0 - 36.0 g/dL    RDW 13.5 11.5 - 14.5 %    Platelets 372 150 - 450 x10*3/uL    Neutrophils % 73.9 40.0 - 80.0 %    Immature Granulocytes %, Automated 0.4 0.0 - 0.9 %    Lymphocytes % 18.9 13.0 - 44.0 %    Monocytes % 4.8 2.0 - 10.0 %    Eosinophils % 1.5 0.0 - 6.0 %    Basophils % 0.5 0.0 - 2.0 %    Neutrophils Absolute 7.16 1.20 - 7.70 x10*3/uL    Immature Granulocytes Absolute, Automated 0.04 0.00 - 0.70 x10*3/uL    Lymphocytes Absolute 1.83 1.20 - 4.80 x10*3/uL    Monocytes Absolute 0.47 0.10 - 1.00 x10*3/uL    Eosinophils Absolute 0.15 0.00 - 0.70 x10*3/uL    Basophils Absolute 0.05 0.00 - 0.10 x10*3/uL   B-Type Natriuretic Peptide   Result Value Ref Range    BNP 33 0 - 99 pg/mL   Troponin I, High Sensitivity, Initial   Result Value Ref Range    Troponin I, High Sensitivity 7 0 - 13 ng/L   ECG 12 Lead   Result Value Ref Range    Ventricular Rate 102 BPM    Atrial Rate 102 BPM    NJ Interval 134 ms    QRS Duration 72 ms    QT Interval 372 ms    QTC Calculation(Bazett) 484 ms    P Axis 69 degrees    R Axis -80 degrees    T Axis 68 degrees    QRS Count 16 beats    Q Onset 216 ms    P Onset 149 ms    P Offset 198 ms    T Offset 402 ms    QTC Fredericia 443 ms   Troponin, High Sensitivity, 1 Hour   Result Value Ref Range    Troponin I, High Sensitivity 5 0 - 13 ng/L   Lactate   Result Value Ref Range    Lactate 1.4 0.4 -  2.0 mmol/L         Assessment/Plan   Assessment & Plan  Acute hypoxic respiratory failure (Multi)  Secondary to atypical pneumonia with possible underlying lung disease; PFTs pending  Currently on 4 L.  Wean oxygen as able.  Pneumonia of both lungs due to Mycoplasma pneumoniae  With mycoplasma IgM positive on recent admission  Reviewed CT with pulmonology, actually appears somewhat improved  Finished a course of antibiotics  We will restart IV steroids, Solu-Medrol 40 mg twice a day  Pulmonology on consult  Type 2 diabetes mellitus, with long-term current use of insulin  Sliding scale.  Will do lower dose of basal with Lantus 20 units    Physical therapy Occupational Therapy.       Ty Tomlinson, DO

## 2024-12-18 NOTE — ASSESSMENT & PLAN NOTE
With mycoplasma IgM positive on recent admission  Reviewed CT with pulmonology, actually appears somewhat improved  Finished a course of antibiotics  We will restart IV steroids, Solu-Medrol 40 mg twice a day  Pulmonology on consult

## 2024-12-19 LAB
ALBUMIN SERPL BCP-MCNC: 3.2 G/DL (ref 3.4–5)
ANION GAP SERPL CALCULATED.3IONS-SCNC: 11 MMOL/L (ref 10–20)
ATRIAL RATE: 102 BPM
BASOPHILS # BLD AUTO: 0.01 X10*3/UL (ref 0–0.1)
BASOPHILS NFR BLD AUTO: 0.1 %
BUN SERPL-MCNC: 15 MG/DL (ref 6–23)
CALCIUM SERPL-MCNC: 9.1 MG/DL (ref 8.6–10.3)
CHLORIDE SERPL-SCNC: 102 MMOL/L (ref 98–107)
CO2 SERPL-SCNC: 28 MMOL/L (ref 21–32)
CREAT SERPL-MCNC: 0.34 MG/DL (ref 0.5–1.05)
CRP SERPL-MCNC: 2.51 MG/DL
EGFRCR SERPLBLD CKD-EPI 2021: >90 ML/MIN/1.73M*2
EOSINOPHIL # BLD AUTO: 0.02 X10*3/UL (ref 0–0.7)
EOSINOPHIL NFR BLD AUTO: 0.3 %
ERYTHROCYTE [DISTWIDTH] IN BLOOD BY AUTOMATED COUNT: 13.4 % (ref 11.5–14.5)
ERYTHROCYTE [SEDIMENTATION RATE] IN BLOOD BY WESTERGREN METHOD: 59 MM/H (ref 0–20)
GLUCOSE BLD MANUAL STRIP-MCNC: 226 MG/DL (ref 74–99)
GLUCOSE BLD MANUAL STRIP-MCNC: 341 MG/DL (ref 74–99)
GLUCOSE BLD MANUAL STRIP-MCNC: 383 MG/DL (ref 74–99)
GLUCOSE BLD MANUAL STRIP-MCNC: 404 MG/DL (ref 74–99)
GLUCOSE BLD MANUAL STRIP-MCNC: 433 MG/DL (ref 74–99)
GLUCOSE SERPL-MCNC: 241 MG/DL (ref 74–99)
HCT VFR BLD AUTO: 42.9 % (ref 36–46)
HGB BLD-MCNC: 14.4 G/DL (ref 12–16)
HOLD SPECIMEN: NORMAL
HOLD SPECIMEN: NORMAL
IMM GRANULOCYTES # BLD AUTO: 0.05 X10*3/UL (ref 0–0.7)
IMM GRANULOCYTES NFR BLD AUTO: 0.6 % (ref 0–0.9)
LYMPHOCYTES # BLD AUTO: 0.75 X10*3/UL (ref 1.2–4.8)
LYMPHOCYTES NFR BLD AUTO: 9.7 %
MCH RBC QN AUTO: 31.2 PG (ref 26–34)
MCHC RBC AUTO-ENTMCNC: 33.6 G/DL (ref 32–36)
MCV RBC AUTO: 93 FL (ref 80–100)
MONOCYTES # BLD AUTO: 0.11 X10*3/UL (ref 0.1–1)
MONOCYTES NFR BLD AUTO: 1.4 %
NEUTROPHILS # BLD AUTO: 6.8 X10*3/UL (ref 1.2–7.7)
NEUTROPHILS NFR BLD AUTO: 87.9 %
NRBC BLD-RTO: 0 /100 WBCS (ref 0–0)
P AXIS: 69 DEGREES
P OFFSET: 198 MS
P ONSET: 149 MS
PHOSPHATE SERPL-MCNC: 3.3 MG/DL (ref 2.5–4.9)
PLATELET # BLD AUTO: 277 X10*3/UL (ref 150–450)
POTASSIUM SERPL-SCNC: 4.5 MMOL/L (ref 3.5–5.3)
PR INTERVAL: 134 MS
Q ONSET: 216 MS
QRS COUNT: 16 BEATS
QRS DURATION: 72 MS
QT INTERVAL: 372 MS
QTC CALCULATION(BAZETT): 484 MS
QTC FREDERICIA: 443 MS
R AXIS: -80 DEGREES
RBC # BLD AUTO: 4.62 X10*6/UL (ref 4–5.2)
RHEUMATOID FACT SER NEPH-ACNC: <10 IU/ML (ref 0–15)
SODIUM SERPL-SCNC: 136 MMOL/L (ref 136–145)
T AXIS: 68 DEGREES
T OFFSET: 402 MS
VENTRICULAR RATE: 102 BPM
WBC # BLD AUTO: 7.7 X10*3/UL (ref 4.4–11.3)

## 2024-12-19 PROCEDURE — 2500000005 HC RX 250 GENERAL PHARMACY W/O HCPCS: Performed by: STUDENT IN AN ORGANIZED HEALTH CARE EDUCATION/TRAINING PROGRAM

## 2024-12-19 PROCEDURE — 85652 RBC SED RATE AUTOMATED: CPT | Performed by: HOSPITALIST

## 2024-12-19 PROCEDURE — 82947 ASSAY GLUCOSE BLOOD QUANT: CPT

## 2024-12-19 PROCEDURE — 2500000001 HC RX 250 WO HCPCS SELF ADMINISTERED DRUGS (ALT 637 FOR MEDICARE OP): Performed by: INTERNAL MEDICINE

## 2024-12-19 PROCEDURE — 94640 AIRWAY INHALATION TREATMENT: CPT

## 2024-12-19 PROCEDURE — 86235 NUCLEAR ANTIGEN ANTIBODY: CPT | Performed by: HOSPITALIST

## 2024-12-19 PROCEDURE — 2500000002 HC RX 250 W HCPCS SELF ADMINISTERED DRUGS (ALT 637 FOR MEDICARE OP, ALT 636 FOR OP/ED): Performed by: INTERNAL MEDICINE

## 2024-12-19 PROCEDURE — 83516 IMMUNOASSAY NONANTIBODY: CPT | Performed by: HOSPITALIST

## 2024-12-19 PROCEDURE — 86036 ANCA SCREEN EACH ANTIBODY: CPT | Performed by: HOSPITALIST

## 2024-12-19 PROCEDURE — 86038 ANTINUCLEAR ANTIBODIES: CPT | Mod: WESLAB | Performed by: HOSPITALIST

## 2024-12-19 PROCEDURE — 86003 ALLG SPEC IGE CRUDE XTRC EA: CPT | Mod: WESLAB | Performed by: HOSPITALIST

## 2024-12-19 PROCEDURE — 99232 SBSQ HOSP IP/OBS MODERATE 35: CPT | Performed by: HOSPITALIST

## 2024-12-19 PROCEDURE — 2500000002 HC RX 250 W HCPCS SELF ADMINISTERED DRUGS (ALT 637 FOR MEDICARE OP, ALT 636 FOR OP/ED): Performed by: REGISTERED NURSE

## 2024-12-19 PROCEDURE — 97166 OT EVAL MOD COMPLEX 45 MIN: CPT | Mod: GO

## 2024-12-19 PROCEDURE — 86431 RHEUMATOID FACTOR QUANT: CPT | Mod: TRILAB,WESLAB | Performed by: HOSPITALIST

## 2024-12-19 PROCEDURE — 86331 IMMUNODIFFUSION OUCHTERLONY: CPT | Performed by: HOSPITALIST

## 2024-12-19 PROCEDURE — 86140 C-REACTIVE PROTEIN: CPT | Performed by: HOSPITALIST

## 2024-12-19 PROCEDURE — S4991 NICOTINE PATCH NONLEGEND: HCPCS | Performed by: INTERNAL MEDICINE

## 2024-12-19 PROCEDURE — 84100 ASSAY OF PHOSPHORUS: CPT | Performed by: INTERNAL MEDICINE

## 2024-12-19 PROCEDURE — 36415 COLL VENOUS BLD VENIPUNCTURE: CPT | Performed by: INTERNAL MEDICINE

## 2024-12-19 PROCEDURE — 36415 COLL VENOUS BLD VENIPUNCTURE: CPT | Performed by: HOSPITALIST

## 2024-12-19 PROCEDURE — 1200000002 HC GENERAL ROOM WITH TELEMETRY DAILY

## 2024-12-19 PROCEDURE — 97161 PT EVAL LOW COMPLEX 20 MIN: CPT | Mod: GP

## 2024-12-19 PROCEDURE — 2500000004 HC RX 250 GENERAL PHARMACY W/ HCPCS (ALT 636 FOR OP/ED): Performed by: INTERNAL MEDICINE

## 2024-12-19 PROCEDURE — 85025 COMPLETE CBC W/AUTO DIFF WBC: CPT | Performed by: INTERNAL MEDICINE

## 2024-12-19 PROCEDURE — 2500000004 HC RX 250 GENERAL PHARMACY W/ HCPCS (ALT 636 FOR OP/ED): Performed by: REGISTERED NURSE

## 2024-12-19 PROCEDURE — 99232 SBSQ HOSP IP/OBS MODERATE 35: CPT | Performed by: INTERNAL MEDICINE

## 2024-12-19 PROCEDURE — 97530 THERAPEUTIC ACTIVITIES: CPT | Mod: GP

## 2024-12-19 RX ORDER — INSULIN GLARGINE 100 [IU]/ML
20 INJECTION, SOLUTION SUBCUTANEOUS DAILY
Status: DISCONTINUED | OUTPATIENT
Start: 2024-12-19 | End: 2024-12-20

## 2024-12-19 RX ORDER — INSULIN LISPRO 100 [IU]/ML
0-10 INJECTION, SOLUTION INTRAVENOUS; SUBCUTANEOUS
Status: DISCONTINUED | OUTPATIENT
Start: 2024-12-19 | End: 2024-12-20 | Stop reason: HOSPADM

## 2024-12-19 RX ORDER — PREDNISONE 20 MG/1
40 TABLET ORAL DAILY
Status: DISCONTINUED | OUTPATIENT
Start: 2024-12-20 | End: 2024-12-20 | Stop reason: HOSPADM

## 2024-12-19 RX ORDER — LORAZEPAM 2 MG/ML
0.5 INJECTION INTRAMUSCULAR ONCE
Status: COMPLETED | OUTPATIENT
Start: 2024-12-19 | End: 2024-12-19

## 2024-12-19 ASSESSMENT — COGNITIVE AND FUNCTIONAL STATUS - GENERAL
WALKING IN HOSPITAL ROOM: A LITTLE
MOBILITY SCORE: 17
DRESSING REGULAR UPPER BODY CLOTHING: A LITTLE
DRESSING REGULAR LOWER BODY CLOTHING: A LITTLE
TOILETING: A LITTLE
MOVING TO AND FROM BED TO CHAIR: A LITTLE
TURNING FROM BACK TO SIDE WHILE IN FLAT BAD: A LITTLE
MOBILITY SCORE: 23
DAILY ACTIVITIY SCORE: 19
DAILY ACTIVITIY SCORE: 24
CLIMB 3 TO 5 STEPS WITH RAILING: A LITTLE
STANDING UP FROM CHAIR USING ARMS: A LITTLE
HELP NEEDED FOR BATHING: A LITTLE
CLIMB 3 TO 5 STEPS WITH RAILING: TOTAL
PERSONAL GROOMING: A LITTLE

## 2024-12-19 ASSESSMENT — PAIN SCALES - GENERAL
PAINLEVEL_OUTOF10: 6
PAINLEVEL_OUTOF10: 6
PAINLEVEL_OUTOF10: 0 - NO PAIN
PAINLEVEL_OUTOF10: 0 - NO PAIN

## 2024-12-19 ASSESSMENT — PAIN - FUNCTIONAL ASSESSMENT
PAIN_FUNCTIONAL_ASSESSMENT: 0-10
PAIN_FUNCTIONAL_ASSESSMENT: 0-10

## 2024-12-19 ASSESSMENT — ACTIVITIES OF DAILY LIVING (ADL)
BATHING_ASSISTANCE: MINIMAL
ADL_ASSISTANCE: INDEPENDENT
ADL_ASSISTANCE: INDEPENDENT

## 2024-12-19 NOTE — ASSESSMENT & PLAN NOTE
Secondary to atypical pneumonia with possible underlying lung disease; PFTs pending  Remains on 4 L currently.  Wean oxygen as able.

## 2024-12-19 NOTE — CARE PLAN
The patient's goals for the shift include feel better    The clinical goals for the shift include HDS

## 2024-12-19 NOTE — ASSESSMENT & PLAN NOTE
With mycoplasma IgM positive on recent admission  Reviewed CT with pulmonology, actually appears somewhat improved  Finished a course of antibiotics  Per pulmonology, decrease steroids to prednisone 40 mg with 10 mg taper every two weeks and stay at 20 mg until outpatient follow up with pulmonology.   Pulmonology consult appreciated, potential for bronchoscopy if no improvement on steroids.

## 2024-12-19 NOTE — PROGRESS NOTES
Occupational Therapy    Evaluation    Patient Name: Alanna Hickman  MRN: [id number]  Department: [address]  Room: [address] [place] [address]  Today's Date: 12/19/24  Time Calculation  Start Time: 1021  Stop Time: 1036  Time Calculation (min): 15 min       Assessment:  OT Assessment: Pt would benefit from acute OT services to address deficits in ADLs, functional mobility, and transfers  End of Session Communication: Bedside nurse  End of Session Patient Position: Bed, 3 rail up, Alarm off, not on at start of session (spoke with RN, pt cleared to sit EOB with alarm off. Pt demonsrated ability to use call light, pt agreeable to call for assist)  OT Assessment Results: Decreased ADL status, Decreased endurance, Decreased functional mobility  Strengths: Ability to acquire knowledge, Support of extended family/friends  Barriers to Participation: Comorbidities  Plan:  Treatment Interventions: ADL retraining, UE strengthening/ROM, Functional transfer training, Endurance training, Equipment evaluation/education, Patient/family training  OT Frequency: 3 times per week  OT Discharge Recommendations: Low intensity level of continued care (recommend assist upon d/c)  OT - OK to Discharge: Yes  Treatment Interventions: ADL retraining, UE strengthening/ROM, Functional transfer training, Endurance training, Equipment evaluation/education, Patient/family training    Subjective   Current Problem:  1. Acute hypoxic respiratory failure (Multi)        2. Pneumonia of both lungs due to infectious organism, unspecified part of lung          General:   OT Received On: 12/19/24  General  Reason for Referral: Impaired ADLs. 43 yo admitted to [facility] via ED 12/18/24 after signing self out from [facility] AMA 12/17/24, being treated mycoplasm PNA, on high flowO2 up until 12/17; Pt returned to [facility] with c/o severe SOB. Pt placed on Bipap in the ED, given ketamine and was able to triate O2 to 4 liters  Referred By: Dr Ty Tomlinson  Past Medical History Relevant to  "Rehab: asthma, CVA on plavix, DM, tobacco smoker, DM with neuroapthy, RA, anklyosing spondylitis (HLA-B27 positve), severe SANGEETA, RA, fibromyalgia, hereditary thrombophilia; anxiety/depression  Family/Caregiver Present: No  Prior to Session Communication: Bedside nurse  Patient Position Received: Bed, 3 rail up, Alarm off, not on at start of session (seated EOB)  General Comment: Cleared by nursing. Pt pleasant and agreeable to therapy   Precautions:  Hearing/Visual Limitations: glasses  Medical Precautions: Fall precautions, Oxygen therapy device and L/min, Infection precautions (4LO2, + contact plus precautions)    Vital Signs Comment: HR 90, SpO2 98% on 4LO2     Pain:  Pain Assessment  Pain Assessment: 0-10  0-10 (Numeric) Pain Score: 6  Pain Location: Rib cage  Pain Orientation: Left, Right  Pain Interventions: Repositioned (medication per nsg)  Response to Interventions: No change in pain    Objective   Cognition:  Overall Cognitive Status: Within Functional Limits  Orientation Level: Oriented X4           Home Living:  Type of Home: House  Lives With: Dependent children (pt lives with two younger children, has two adult amanda who live in [facility])  Home Adaptive Equipment: Other (Comment) (rollator)  Home Layout: Multi-level, Able to live on main level with bedroom/bathroom, Laundry in basement  Home Access: Stairs to enter with rails  Entrance Stairs-Rails:  (unilateral)  Bathroom Shower/Tub: Walk-in shower  Bathroom Toilet: Adaptive toilet seating  Bathroom Equipment: Grab bars in shower, Shower chair with back  Bathroom Accessibility: main level  Home Living Comments: first floor set up  Prior Function:  Level of [place]: Independent with ADLs and functional transfers, Needs assistance with homemaking  Receives Help From: Family (adult dtr and \"children's father\")  ADL Assistance: Independent  Homemaking Assistance: Needs assistance (\" children's father\" does laundry, cleans , drives and " grocery shops)  Ambulatory Assistance:  (pt reports use of rollator in community, reports house is too small to use rollator inside)  Prior Function Comments: pt manages her meds     ADL:  Eating Assistance: Independent  Grooming Assistance: Stand by  Bathing Assistance: Minimal  UE Dressing Assistance: Stand by  LE Dressing Assistance:  (Close supervision)  Toileting Assistance with Device:  (Close supervision)  Activities of Daily Living:       LE Dressing  LE Dressing: Yes  Shoe Level of Assistance: Close supervision  LE Dressing Where Assessed: Edge of bed  LE Dressing Comments: pt demonstrated ability to adjust socks using figure four technique, increased effort and fatgiue/SOB noted when completing task     Activity Tolerance:  Endurance: Decreased tolerance for upright activites  Activity Tolerance Comments: decreased secondary to fatigue, SOB, and coughing     Bed Mobility/Transfers: Bed Mobility  Bed Mobility: No    Transfers  Transfer:  (close supervision for safety sit<>stand bed level)     Functional Mobility:  Functional Mobility  Functional Mobility Performed:  (close supervision for safety for functional mobility short household distance without use of AD, limited tolerance to activity secondary to SOB)  Sitting Balance:  Static Sitting Balance  Static Sitting-Level of Assistance: Independent  Standing Balance:    Sensation:  Sensation Comment: Pt denied numbness/paresthesia BUEs  Strength:  Strength Comments: BUE grossly 3+/5      Hand Function:  Hand Function  Gross Grasp: Functional  Coordination: Functional  Extremities: RUE   RUE : Within Functional Limits and LUE   LUE: Within Functional Limits    Outcome Measures: [facility] Daily Activity  Putting on and taking off regular lower body clothing: A little  Bathing (including washing, rinsing, drying): A little  Putting on and taking off regular upper body clothing: A little  Toileting, which includes using toilet, bedpan or urinal: A little  Taking  care of personal grooming such as brushing teeth: A little  Eating Meals: None  Daily Activity - Total Score: 19    Education Documentation  ADL Training, taught by Rose Marie Mcclelland OT at 12/19/2024 12:06 PM.  Learner: Patient  Readiness: Acceptance  Method: Explanation  Response: Verbalizes Understanding    Education Comments  No comments found.      Goals:  Encounter Problems       Encounter Problems (Active)       ADLs       Pt will complete ADL tasks at mod I with use of AE prn  (Progressing)       Start:  12/19/24    Expected End:  01/16/25               Functional Mobility       Pt will perform functional mobility household distances at mod I with use of LRAD.    (Progressing)       Start:  12/19/24    Expected End:  01/16/25               OT Transfers       Pt will perform functional transfers at mod I. (Progressing)       Start:  12/19/24    Expected End:  01/16/25

## 2024-12-19 NOTE — PROGRESS NOTES
Alanna Hickman is a 44 y.o. female on day 1 of admission presenting with Acute hypoxic respiratory failure (Multi).      Subjective   Seen in follow-up.  She notes doing OK, improving. She reported feeling significantly short of breath during an attempt to remove oxygen.        Objective     Last Recorded Vitals  /67 (BP Location: Right arm, Patient Position: Lying)   Pulse 66   Temp 36.4 °C (97.5 °F) (Oral)   Resp 16   Wt 107 kg (235 lb)   SpO2 95%   Intake/Output last 3 Shifts:    Intake/Output Summary (Last 24 hours) at 12/19/2024 1058  Last data filed at 12/19/2024 0300  Gross per 24 hour   Intake 2000 ml   Output --   Net 2000 ml       Admission Weight  Weight: 107 kg (235 lb) (12/18/24 0338)    Daily Weight  12/18/24 : 107 kg (235 lb)    Image Results  ECG 12 Lead  Sinus tachycardia  Possible Left atrial enlargement  Low voltage QRS  Left anterior fascicular block  QTcB >= 480 msec  Abnormal ECG  When compared with ECG of 08-DEC-2024 18:09,  No significant change was found  CT angio chest for pulmonary embolism  Narrative: STUDY:  CT Angiogram of the Chest; 12/18/2024 5:12 AM  INDICATION:  Shortness of breath, orthopnea, recent hospitalization.  COMPARISON:  CTA chest 12/08/2024.  ACCESSION NUMBER(S):  [id number]  ORDERING CLINICIAN:  BRANDON ENRIQUEZ  TECHNIQUE:  CTA of the chest was performed with intravenous contrast.   Images are reviewed and processed at a workstation according to the CT  angiogram protocol with 3-D and/or MIP post processing imaging  generated.  Omnipaque 350 75 mL was administered intravenously.  Automated mA/kV exposure control was utilized and patient examination  was performed in strict accordance with principles of ALARA.  FINDINGS:  No evidence of acute pulmonary embolism.  The thoracic aorta is normal  in course and caliber without dissection or aneurysm.  The heart is normal in size without pericardial effusion.  Thoracic  lymph nodes are not enlarged.  Patchy  bilateral ground glass consolidation, right greater than left.   No pneumothorax or pleural effusion.  The airways are patent. There is  evidence of prior granulomatous disease.  Partially imaged calcifications in the region of the pancreatic head.   Correlate clinically for history of chronic pancreatitis.  There are no acute fractures.  No suspicious bony lesions.  Impression: No evidence of acute pulmonary embolism.  Patchy bilateral consolidation, likely infectious/inflammatory in  etiology, which appears slightly improved in the lower lobes but  worsened in the right upper lobe compared to prior.  Signed by Jsutice Menard MD  XR chest 1 view  Narrative: Interpreted By:  Steve Robles,   STUDY:  XR CHEST 1 VIEW;  12/18/2024 4:15 am      INDICATION:  Signs/Symptoms:SOB.          COMPARISON:  12/11/2024.      ACCESSION NUMBER(S):  [id number]      ORDERING CLINICIAN:  BRANDON ENRIQUEZ      FINDINGS:  AP radiograph of the chest was provided.      Leads overlie the chest, partially obscuring the field-of-view.  Apical kyphosis.      CARDIOMEDIASTINAL SILHOUETTE:  Cardiomediastinal silhouette is normal in size and configuration.      LUNGS:  Lungs appear clear. No pleural effusion or pneumothorax.      ABDOMEN:  No remarkable upper abdominal findings.      BONES:  No acute osseous changes.      Impression: 1.  No definite evidence of acute cardiopulmonary process.              MACRO:  None      Signed by: Steve Robles 12/18/2024 4:19 AM  Dictation workstation:   [id number]      Physical Exam  Vitals and nursing note reviewed.   Constitutional:       General: She is not in acute distress.     Appearance: She is obese. She is ill-appearing.   HENT:      Head: Normocephalic and atraumatic.      Right Ear: External ear normal.      Left Ear: External ear normal.      Nose: Nose normal. No rhinorrhea.      Mouth/Throat:      Mouth: Mucous membranes are moist.      Pharynx: Oropharynx is clear. No oropharyngeal exudate.    Eyes:      General: No scleral icterus.        Right eye: No discharge.         Left eye: No discharge.      Conjunctiva/sclera: Conjunctivae normal.   Cardiovascular:      Rate and Rhythm: Normal rate and regular rhythm.      Pulses: Normal pulses.      Heart sounds: Normal heart sounds. No murmur heard.  Pulmonary:      Effort: Pulmonary effort is normal. No respiratory distress.      Breath sounds: Wheezing (scattered expiratory) present. No rales.   Abdominal:      General: Abdomen is flat. There is no distension.      Palpations: Abdomen is soft.      Tenderness: There is no abdominal tenderness.   Musculoskeletal:         General: No tenderness.      Right lower leg: No edema.      Left lower leg: No edema.   Skin:     General: Skin is warm and dry.      Capillary Refill: Capillary refill takes less than 2 seconds.      Findings: No rash.   Neurological:      General: No focal deficit present.      Mental Status: She is alert and oriented to person, place, and time. Mental status is at baseline.   Psychiatric:         Mood and Affect: Mood normal.         Behavior: Behavior normal.         Thought Content: Thought content normal.         Judgment: Judgment normal.         Relevant Results      Scheduled medications  clopidogrel, 75 mg, oral, Daily  dapagliflozin propanediol, 10 mg, oral, Daily  enoxaparin, 40 mg, subcutaneous, Daily  insulin lispro, 0-10 Units, subcutaneous, TID AC  ipratropium-albuteroL, 3 mL, nebulization, TID  methylPREDNISolone sodium succinate (PF), 40 mg, intravenous, q12h  nicotine, 1 patch, transdermal, Daily  oxygen, , inhalation, Continuous - Inhalation  pantoprazole, 20 mg, oral, Daily      Continuous medications     PRN medications  PRN medications: acetaminophen **OR** acetaminophen **OR** acetaminophen, benzocaine-menthol, cyclobenzaprine, hydrocodone-homatropine, hydrOXYzine HCL, ipratropium-albuteroL, melatonin, ondansetron ODT **OR** ondansetron, oxygen      Results for  orders placed or performed during the hospital encounter of 12/18/24 (from the past 24 hours)   POCT GLUCOSE   Result Value Ref Range    POCT Glucose 259 (H) 74 - 99 mg/dL   POCT GLUCOSE   Result Value Ref Range    POCT Glucose 344 (H) 74 - 99 mg/dL   POCT GLUCOSE   Result Value Ref Range    POCT Glucose 352 (H) 74 - 99 mg/dL   Renal Function Panel   Result Value Ref Range    Glucose 241 (H) 74 - 99 mg/dL    Sodium 136 136 - 145 mmol/L    Potassium 4.5 3.5 - 5.3 mmol/L    Chloride 102 98 - 107 mmol/L    Bicarbonate 28 21 - 32 mmol/L    Anion Gap 11 10 - 20 mmol/L    Urea Nitrogen 15 6 - 23 mg/dL    Creatinine 0.34 (L) 0.50 - 1.05 mg/dL    eGFR >90 >60 mL/min/1.73m*2    Calcium 9.1 8.6 - 10.3 mg/dL    Phosphorus 3.3 2.5 - 4.9 mg/dL    Albumin 3.2 (L) 3.4 - 5.0 g/dL   CBC and Auto Differential   Result Value Ref Range    WBC 7.7 4.4 - 11.3 x10*3/uL    nRBC 0.0 0.0 - 0.0 /100 WBCs    RBC 4.62 4.00 - 5.20 x10*6/uL    Hemoglobin 14.4 12.0 - 16.0 g/dL    Hematocrit 42.9 36.0 - 46.0 %    MCV 93 80 - 100 fL    MCH 31.2 26.0 - 34.0 pg    MCHC 33.6 32.0 - 36.0 g/dL    RDW 13.4 11.5 - 14.5 %    Platelets 277 150 - 450 x10*3/uL    Neutrophils % 87.9 40.0 - 80.0 %    Immature Granulocytes %, Automated 0.6 0.0 - 0.9 %    Lymphocytes % 9.7 13.0 - 44.0 %    Monocytes % 1.4 2.0 - 10.0 %    Eosinophils % 0.3 0.0 - 6.0 %    Basophils % 0.1 0.0 - 2.0 %    Neutrophils Absolute 6.80 1.20 - 7.70 x10*3/uL    Immature Granulocytes Absolute, Automated 0.05 0.00 - 0.70 x10*3/uL    Lymphocytes Absolute 0.75 (L) 1.20 - 4.80 x10*3/uL    Monocytes Absolute 0.11 0.10 - 1.00 x10*3/uL    Eosinophils Absolute 0.02 0.00 - 0.70 x10*3/uL    Basophils Absolute 0.01 0.00 - 0.10 x10*3/uL   POCT GLUCOSE   Result Value Ref Range    POCT Glucose 226 (H) 74 - 99 mg/dL         ECG 12 Lead    Result Date: 12/18/2024  Sinus tachycardia Possible Left atrial enlargement Low voltage QRS Left anterior fascicular block QTcB >= 480 msec Abnormal ECG When compared with  ECG of 08-DEC-2024 18:09, No significant change was found    CT angio chest for pulmonary embolism    Result Date: 12/18/2024  STUDY: CT Angiogram of the Chest; 12/18/2024 5:12 AM INDICATION: Shortness of breath, orthopnea, recent hospitalization. COMPARISON: CTA chest 12/08/2024. ACCESSION NUMBER(S): [id number] ORDERING CLINICIAN: BRANDON ENRIQUEZ TECHNIQUE:  CTA of the chest was performed with intravenous contrast. Images are reviewed and processed at a workstation according to the CT angiogram protocol with 3-D and/or MIP post processing imaging generated.  Omnipaque 350 75 mL was administered intravenously. Automated mA/kV exposure control was utilized and patient examination was performed in strict accordance with principles of ALARA. FINDINGS: No evidence of acute pulmonary embolism.  The thoracic aorta is normal in course and caliber without dissection or aneurysm. The heart is normal in size without pericardial effusion.  Thoracic lymph nodes are not enlarged. Patchy bilateral ground glass consolidation, right greater than left. No pneumothorax or pleural effusion.  The airways are patent. There is evidence of prior granulomatous disease. Partially imaged calcifications in the region of the pancreatic head. Correlate clinically for history of chronic pancreatitis. There are no acute fractures.  No suspicious bony lesions.    No evidence of acute pulmonary embolism. Patchy bilateral consolidation, likely infectious/inflammatory in etiology, which appears slightly improved in the lower lobes but worsened in the right upper lobe compared to prior. Signed by Justice Menard MD    XR chest 1 view    Result Date: 12/18/2024  Interpreted By:  Steve Robles, STUDY: XR CHEST 1 VIEW;  12/18/2024 4:15 am   INDICATION: Signs/Symptoms:SOB.     COMPARISON: 12/11/2024.   ACCESSION NUMBER(S): [id number]   ORDERING CLINICIAN: BRANDON ENRIQUEZ   FINDINGS: AP radiograph of the chest was provided.   Leads overlie the chest,  partially obscuring the field-of-view. Apical kyphosis.   CARDIOMEDIASTINAL SILHOUETTE: Cardiomediastinal silhouette is normal in size and configuration.   LUNGS: Lungs appear clear. No pleural effusion or pneumothorax.   ABDOMEN: No remarkable upper abdominal findings.   BONES: No acute osseous changes.       1.  No definite evidence of acute cardiopulmonary process.       MACRO: None   Signed by: Steve Robles 12/18/2024 4:19 AM Dictation workstation:   [id number]              Assessment/Plan          Assessment & Plan  Acute hypoxic respiratory failure (Multi)  Secondary to atypical pneumonia with possible underlying lung disease; PFTs pending  Remains on 4 L currently.  Wean oxygen as able.    Pneumonia of both lungs due to Mycoplasma pneumoniae  With mycoplasma IgM positive on recent admission  Reviewed CT with pulmonology, actually appears somewhat improved  Finished a course of antibiotics  Per pulmonology, decrease steroids to prednisone 40 mg with 10 mg taper every two weeks and stay at 20 mg until outpatient follow up with pulmonology.   Pulmonology consult appreciated, potential for bronchoscopy if no improvement on steroids.     Type 2 diabetes mellitus, with long-term current use of insulin  Sliding scale.  Blood sugars last 24H 226-352  Resume basal insulin now at 20 units subcutaneous daily    Cigarette nicotine dependence  Nicoderm patch                  Ty Angela MD

## 2024-12-19 NOTE — PROGRESS NOTES
Physical Therapy    Physical Therapy Evaluation & Treatment    Patient Name: Alanna Hickman  MRN: [id number]  Department: [address]  Room: [address] [place] [address]  Today's Date: 12/19/2024   Time Calculation  Start Time: 0745  Stop Time: 0815  Time Calculation (min): 30 min    Assessment/Plan   PT Assessment  PT Assessment Results: Decreased strength, Decreased endurance, Impaired balance, Decreased mobility, Decreased safety awareness, Obesity, Pain  Rehab Prognosis: Good  Barriers to Discharge Home: No anticipated barriers  Evaluation/Treatment Tolerance: Patient limited by fatigue  Medical Staff Made Aware: Yes  Strengths: Ability to acquire knowledge, Support of extended family/friends, Living arrangement secure  Barriers to Participation: Comorbidities  End of Session Communication: Bedside nurse  Assessment Comment: pt demonstrated need for close supervision to min assist of 1 for the above limited mobility; pt demonstrates decreased strength, balance, endurance, mobility ease and  safety as compared to baseline however pt should progress well enough for safe d/c home, PRN assist from family, use of rollator PRN and low int rehab. Will benefit from continued skilled PT services while in acute care.  End of Session Patient Position: Up in chair, Alarm on (call button in reach)   IP OR SWING BED PT PLAN  Inpatient or Swing Bed: Inpatient  PT Plan  Treatment/Interventions: Bed mobility, Transfer training, Gait training, Stair training, Balance training, Strengthening, Endurance training, Therapeutic exercise, Therapeutic activity  PT Plan: Ongoing PT  PT Frequency: 5 times per week  PT Discharge Recommendations: Low intensity level of continued care  Equipment Recommended upon Discharge: Other (comment) (rollator)  PT Recommended Transfer Status: Assist x1, Assistive device (FWW)  PT - OK to Discharge: Yes      Subjective     General Visit Information:  General  Reason for Referral: impaired mobility; + PNA  Referred By:   "Ty Tomlinson  Past Medical History Relevant to Rehab: asthma, CVA on plavix, DM, tobacco smoker, DM with neuroapthy, RA, anklyosing spondylitis (HLA-B27 positve), severe SANGEETA, RA, fibromyalgia, hereditary thrombophilia; anxiety/depression  Family/Caregiver Present: No  Prior to Session Communication: Bedside nurse  Patient Position Received: Bed, 3 rail up, Alarm off, not on at start of session  Preferred Learning Style: verbal, visual  General Comment: 45 yo WF admitted to [facility] via ED 12/18/24 after signing self out from [facility] AMA 12/17/24, being treated mycoplasm PNA, on high flowo2 up till 12/17; Pt returned to [facility] with c/o severe SOB. Pt placed on Bipap in the ED, given ketamine and was able to triate o2 to 4 liters. Cleared by nurse for therapy; pt agreeable to therapy. IV, + telemetry.  Home Living:  Home Living  Type of Home: House  Lives With: Dependent children  Home Adaptive Equipment: Other (Comment) (rollator)  Home Layout: Multi-level, Able to live on main level with bedroom/bathroom, Laundry in basement  Home Access: Stairs to enter with rails  Entrance Stairs-Rails:  (unilateral)  Entrance Stairs-Number of Steps: 3  Bathroom Shower/Tub: Walk-in shower  Bathroom Toilet: Adaptive toilet seating  Bathroom Equipment: Grab bars in shower, Shower chair with back  Bathroom Accessibility: main level  Home Living Comments: stays on main floor  Prior Level of Function:  Prior Function Per Pt/Caregiver Report  Level of [place]: Independent with ADLs and functional transfers, Needs assistance with homemaking  Receives Help From: Family (\" children's father\")  ADL Assistance: Independent  Homemaking Assistance:  (\" children's father\" does laundry, cleans , drives and grocery shops)  Ambulatory Assistance: Independent (with rollator)  Prior Function Comments: pt manages her meds  Precautions:  Precautions  Hearing/Visual Limitations: glasses  Medical Precautions: Fall precautions, Infection precautions, " Oxygen therapy device and L/min (6 liters o2 via nc; + contact plus precautions)    Vital Signs (Past 2hrs)           Vital Signs Comment: O2 sat 100% with HR 84 bpm in supine; O2 sat 95% with HR 96 bpm after amb to bedside chair    Objective   Pain:  Pain Assessment  Pain Assessment: 0-10  0-10 (Numeric) Pain Score: 6  Pain Type: Chronic pain  Pain Location: Rib cage  Pain Orientation: Right, Left  Pain Interventions:  (change of position; splinting technique with coughing)  Response to Interventions: No change in pain  Cognition:  Cognition  Overall Cognitive Status: Within Functional Limits  Orientation Level: Oriented X4  Safety/Judgement:  (decreased safety insight during functional mobility)  Insight: Mild  Impulsive: Mildly    General Assessments:  General Observation  General Observation: cooperative, flat affect, c/o fatigue, visible skin intact               Activity Tolerance  Endurance: Decreased tolerance for upright activites  Activity Tolerance Comments: fair at best due to fatigue, coughing, SOb  Rate of Perceived Exertion (RPE): 6/10    Sensation  Light Touch: No apparent deficits    Strength  Strength Comments: diamond hips 3+/5, knees 4/5, ankles 4-/5  Coordination  Movements are Fluid and Coordinated: No  Lower Body Coordination: all lower body movements slow, guarded    Postural Control  Posture Comment: obese with mild forward head, protracted shldrs    Static Sitting Balance  Static Sitting-Balance Support: Feet supported  Static Sitting-Level of Assistance: Close supervision  Static Sitting-Comment/Number of Minutes: 9 min---good  Dynamic Sitting Balance  Dynamic Sitting-Balance Support: Feet supported  Dynamic Sitting-Level of Assistance: Contact guard  Dynamic Sitting-Comments: good -    Static Standing Balance  Static Standing-Balance Support: No upper extremity supported  Static Standing-Level of Assistance: Minimum assistance  Static Standing-Comment/Number of Minutes: 1-2 min---good  -  Dynamic Standing Balance  Dynamic Standing-Balance Support: No upper extremity supported  Dynamic Standing-Level of Assistance: Minimum assistance  Dynamic Standing-Comments: fair +  Functional Assessments:  Bed Mobility  Bed Mobility: Yes  Bed Mobility 1  Bed Mobility 1: Supine to sitting  Level of Assistance 1: Close supervision  Bed Mobility Comments 1: head of bed elevated; us eof bedrail; mildly effortful    Transfers  Transfer: Yes  Transfer 1  Transfer From 1: Bed to  Transfer to 1: Stand  Technique 1: Sit to stand  Transfer Level of Assistance 1: Contact guard, Minimal verbal cues  Trials/Comments 1: verbal cues for proper diamond hand placement on bed----x 3 trials  Transfers 2  Transfer From 2: Stand to  Transfer to 2: Chair with arms  Technique 2: Stand to sit  Transfer Level of Assistance 2: Contact guard, Minimal verbal cues  Trials/Comments 2: verbal cues for reaching back with both hands for arms of chair---X 3 trials  Transfers 3  Transfer From 3:  (onto and off of [facility])  Technique 3: Sit to stand, Stand to sit  Transfer Level of Assistance 3: Contact guard  Trials/Comments 3: good diamond hand placement on arms of BSC    Ambulation/Gait Training  Ambulation/Gait Training Performed: Yes  Ambulation/Gait Training 1  Surface 1: Level tile  Device 1: No device  Assistance 1: Minimum assistance  Quality of Gait 1: Narrow base of support, Shuffling gait, Forward flexed posture  Comments/Distance (ft) 1: pt stood at [facility] x 1 min after completing toileting, then amb approx 10 ft to bedside chair, verbal cues for erect posture, pursed lip breathing.    Stairs  Stairs: No  Extremity/Trunk Assessments:  Cervical Spine   Cervical Spine:  (mild forward head)  RLE   RLE :  (see above strength comments)  LLE   LLE :  (see above strength comments)  Treatments:  Therapeutic Activity  Therapeutic Activity Performed: Yes (see bed mobility, tranfers, amb without device)  Therapeutic Activity 1: seated deep breathing x 3 sets  of 5 reps  Outcome Measures:  [facility] Basic Mobility  Turning from your back to your side while in a flat bed without using bedrails: None  Moving from lying on your back to sitting on the side of a flat bed without using bedrails: A little  Moving to and from bed to chair (including a wheelchair): A little  Standing up from a chair using your arms (e.g. wheelchair or bedside chair): A little  To walk in hospital room: A little  Climbing 3-5 steps with railing: Total  Basic Mobility - Total Score: 17    Encounter Problems       Encounter Problems (Active)       Balance       STG - Maintains dynamic standing balance with upper extremity support (Progressing)       Start:  12/19/24    Expected End:  01/16/25       INTERVENTIONS:  1. Practice standing with minimal support.  2. Educate patient about standing tolerance.  3. Educate patient about independence with gait, transfers, and ADL's.  4. Educate patient about use of assistive device.  5. Educate patient about self-directed care.            Mobility       STG - Patient will ambulate 100 ft, LRAD, + turns, mod Ind (Progressing)       Start:  12/19/24    Expected End:  01/16/25            pt ascends/descends 3 steps with 1 handrail mod ind (Progressing)       Start:  12/19/24    Expected End:  01/16/25               PT Transfers       STG - Patient to transfer to and from sit to supine mod Ind (Progressing)       Start:  12/19/24    Expected End:  01/16/25            STG - Patient will transfer sit to and from stand mod Ind (Progressing)       Start:  12/19/24    Expected End:  01/16/25               Pain       pt will verbalize no > 4/10 pain during functional mobility (Progressing)       Start:  12/19/24    Expected End:  01/16/25                   Education Documentation  Precautions, taught by Smitha Taylor, PT at 12/19/2024 10:38 AM.  Learner: Patient  Readiness: Acceptance  Method: Explanation, Demonstration  Response: Verbalizes Understanding, Needs  Reinforcement    Mobility Training, taught by Smitha Taylor, PT at 12/19/2024 10:38 AM.  Learner: Patient  Readiness: Acceptance  Method: Explanation, Demonstration  Response: Verbalizes Understanding, Needs Reinforcement    Education Comments  No comments found.

## 2024-12-19 NOTE — CARE PLAN
The patient's goals for the shift include control cough     The clinical goals for the shift include monitor 02 and SOB

## 2024-12-19 NOTE — PROGRESS NOTES
"Pulmonary Daily Progress Note   Subjective    Alanna Hickman is a 44 y.o. year old female patient known with asthma, CVA on plavix, DM, smoker, possible spondyloarthropathy, severe SANGEETA, prescribed PAP therapy but had trouble with machine admitted on 12/18/2024 with recurrent dyspnea, hypoxemia.     Interval History:  Patient feeling much better than yesterday.    Meds    Scheduled medications  clopidogrel, 75 mg, oral, Daily  dapagliflozin propanediol, 10 mg, oral, Daily  enoxaparin, 40 mg, subcutaneous, Daily  insulin lispro, 0-10 Units, subcutaneous, TID AC  ipratropium-albuteroL, 3 mL, nebulization, TID  methylPREDNISolone sodium succinate (PF), 40 mg, intravenous, q12h  nicotine, 1 patch, transdermal, Daily  oxygen, , inhalation, Continuous - Inhalation  pantoprazole, 20 mg, oral, Daily      Continuous medications  sodium chloride 0.9%, 100 mL/hr, Last Rate: 100 mL/hr (12/18/24 2314)      PRN medications  PRN medications: acetaminophen **OR** acetaminophen **OR** acetaminophen, benzocaine-menthol, cyclobenzaprine, hydrocodone-homatropine, hydrOXYzine HCL, ipratropium-albuteroL, melatonin, ondansetron ODT **OR** ondansetron, oxygen     Objective    Blood pressure 136/67, pulse 66, temperature 36.4 °C (97.5 °F), temperature source Oral, resp. rate 16, height 1.702 m (5' 7\"), weight 107 kg (235 lb), SpO2 95%.   Physical Exam   GENERAL: normal appearance. well nourished. No respiratory distress  HEAD/SINUSES: no sinus tenderness  OROPHARYNX: Moist mucosa, no thrush or lesions  NECK: no JVD, midline trachea without stridor. Thyroid not enlarged  LYMPH NODES : none felt in the cervical, submandibular or supraclavicular regions  LUNGS: Symmetric chest. Good excursion. Equal breath sounds. no wheezing. no crackles or rhonchi  CARDIAC: normal S1 and S2; no gallops, rubs or murmurs. Regular rate and rhythm  EXTREMITIES: No edema, no varicose veins  NEURO: grossly normal mental status, CN reflexes and motor strength. "   SKIN: Skin turgor normal. No rashes or lesions.   PSYCH: Normal affect    Intake/Output Summary (Last 24 hours) at 12/19/2024 0943  Last data filed at 12/19/2024 0300  Gross per 24 hour   Intake 2000 ml   Output --   Net 2000 ml     Labs:   Results from last 72 hours   Lab Units 12/19/24  0538 12/18/24  0354 12/17/24  0455   SODIUM mmol/L 136 133* 136   POTASSIUM mmol/L 4.5 4.1 3.9   CHLORIDE mmol/L 102 96* 100   CO2 mmol/L 28 30 31   BUN mg/dL 15 18 20   CREATININE mg/dL 0.34* 0.47* 0.41*   GLUCOSE mg/dL 241* 257* 262*   CALCIUM mg/dL 9.1 9.6 9.1   ANION GAP mmol/L 11 11 9*   EGFR mL/min/1.73m*2 >90 >90 >90   PHOSPHORUS mg/dL 3.3  --   --       Results from last 72 hours   Lab Units 12/19/24  0538 12/18/24  0354   WBC AUTO x10*3/uL 7.7 9.7   HEMOGLOBIN g/dL 14.4 16.8*   HEMATOCRIT % 42.9 50.1*   PLATELETS AUTO x10*3/uL 277 372   NEUTROS PCT AUTO % 87.9 73.9   LYMPHS PCT AUTO % 9.7 18.9   MONOS PCT AUTO % 1.4 4.8   EOS PCT AUTO % 0.3 1.5          Results from last 72 hours   Lab Units 12/18/24  0354   POCT PH, VENOUS pH 7.39   POCT PCO2, VENOUS mm Hg 59*   POCT PO2, VENOUS mm Hg 21*      Micro/ID:   Lab Results   Component Value Date    URINECULTURE No significant growth 11/20/2023    BLOODCULT Loaded on Instrument - Culture in progress 12/18/2024    BLOODCULT Loaded on Instrument - Culture in progress 12/18/2024     Summary of key imaging results from the last 24 hours  CTA chest (12/18/24)  IMPRESSION:  No evidence of acute pulmonary embolism.  Patchy bilateral consolidation, likely infectious/inflammatory in  etiology, which appears slightly improved in the lower lobes but  worsened in the right upper lobe compared to prior.    Impression   Alanna Hickman is a 44 y.o. year old female patient is being seen by the pulmonary service for   Acute hypoxemic respiratory failure - Due to GGO and nodular infiltrates seen on CT, due to resolving atypical PNA versus other infectious/inflammatory process. Overall,  repeat CT looks improved.  Bilateral infiltrates, micronodular densities  Severe SANGEETA - Never tolerated or stabilized on PAP due to interface intolerance.    Recommendations   As follows:  Overall, patient appears to be improving with no acute changes or worsening  Decrease steroids to prednisone 40 mg daily; would plan for taper of 10 mg every 2 weeks (e.g., prednisone 40mg x2 weeks --> 30 mg x2 weeks --> 20 mg x2 weeks) and stay at 20 mg daily until seen by pulmonology  Labs ordered for ILD/CTD workup which can be followed as an outpatient  SW/CM consult to ensure adequate oxygen devices at home (e.g., portable concentrator) based on walking pulse oximetry test  Pulmonary consults will follow peripherally until discharge    Andrade Aguirre MD   12/19/24 at 9:43 AM     -Only the Medical problems listed under impression were addressed today.   -Please contact primary team for all other concerns and medical problem  -Thank you for your consult       Disclaimer: Documentation completed with the information available at the time of input. Parts of this note may have been scribed or generated using voice dictation software, Dragon.  Homophonic errors may exist.  Please contact me directly if clarification is needed. The times in the chart may not be reflective of actual patient care times, interventions, or procedures. Documentation occurs after the physical care of the patient.

## 2024-12-19 NOTE — CARE PLAN
RE-ADMIT NOTE:     Pt was admitted at [facility] from 12/8-12/17 for SOB, productive cough, dx with pneumonia. She signed out AMA and returned yesterday for SOB  Pt is + for mycoplasma IgM and is currently on 4 L of 02  At this time there is no anticipated discharge needs    DISCHARGE PLAN: AT THIS TIME THE PLAN IS HOME

## 2024-12-19 NOTE — ASSESSMENT & PLAN NOTE
Sliding scale.  Blood sugars last 24H 226-352  Resume basal insulin now at 20 units subcutaneous daily

## 2024-12-20 ENCOUNTER — PHARMACY VISIT (OUTPATIENT)
Dept: PHARMACY | Facility: CLINIC | Age: 44
End: 2024-12-20
Payer: MEDICAID

## 2024-12-20 VITALS
RESPIRATION RATE: 18 BRPM | BODY MASS INDEX: 36.88 KG/M2 | WEIGHT: 235 LBS | SYSTOLIC BLOOD PRESSURE: 116 MMHG | TEMPERATURE: 97.7 F | DIASTOLIC BLOOD PRESSURE: 48 MMHG | HEART RATE: 69 BPM | OXYGEN SATURATION: 100 % | HEIGHT: 67 IN

## 2024-12-20 LAB
A ALTERNATA IGE QN: <0.1 KU/L
A FUMIGATUS IGE QN: <0.1 KU/L
ALBUMIN SERPL BCP-MCNC: 3 G/DL (ref 3.4–5)
ANA PATTERN: ABNORMAL
ANA SER QL HEP2 SUBST: POSITIVE
ANA TITR SER IF: ABNORMAL {TITER}
ANION GAP SERPL CALCULATED.3IONS-SCNC: 10 MMOL/L (ref 10–20)
BASOPHILS # BLD AUTO: 0.03 X10*3/UL (ref 0–0.1)
BASOPHILS NFR BLD AUTO: 0.4 %
BERMUDA GRASS IGE QN: <0.1 KU/L
BOXELDER IGE QN: 0.13 KU/L
BUN SERPL-MCNC: 14 MG/DL (ref 6–23)
C HERBARUM IGE QN: <0.1 KU/L
CALCIUM SERPL-MCNC: 8.7 MG/DL (ref 8.6–10.3)
CALIF WALNUT POLN IGE QN: <0.1 KU/L
CAT DANDER IGE QN: <0.1 KU/L
CENTROMERE B AB SER-ACNC: <0.2 AI
CHLORIDE SERPL-SCNC: 100 MMOL/L (ref 98–107)
CHROMATIN AB SERPL-ACNC: <0.2 AI
CMN PIGWEED IGE QN: <0.1 KU/L
CO2 SERPL-SCNC: 30 MMOL/L (ref 21–32)
COMMON RAGWEED IGE QN: <0.1 KU/L
COTTONWOOD IGE QN: <0.1 KU/L
CREAT SERPL-MCNC: 0.45 MG/DL (ref 0.5–1.05)
D FARINAE IGE QN: <0.1 KU/L
D PTERONYSS IGE QN: <0.1 KU/L
DOG DANDER IGE QN: <0.1 KU/L
DSDNA AB SER-ACNC: 1 IU/ML
EGFRCR SERPLBLD CKD-EPI 2021: >90 ML/MIN/1.73M*2
ENA JO1 AB SER QL IA: <0.2 AI
ENA RNP AB SER IA-ACNC: 0.3 AI
ENA SCL70 AB SER QL IA: 0.2 AI
ENA SM AB SER IA-ACNC: <0.2 AI
ENA SM+RNP AB SER QL IA: <0.2 AI
ENA SS-A AB SER IA-ACNC: <0.2 AI
ENA SS-B AB SER IA-ACNC: 0.5 AI
ENGL PLANTAIN IGE QN: <0.1 KU/L
EOSINOPHIL # BLD AUTO: 0.2 X10*3/UL (ref 0–0.7)
EOSINOPHIL NFR BLD AUTO: 2.6 %
ERYTHROCYTE [DISTWIDTH] IN BLOOD BY AUTOMATED COUNT: 13.4 % (ref 11.5–14.5)
GLUCOSE BLD MANUAL STRIP-MCNC: 223 MG/DL (ref 74–99)
GLUCOSE BLD MANUAL STRIP-MCNC: 257 MG/DL (ref 74–99)
GLUCOSE BLD MANUAL STRIP-MCNC: 262 MG/DL (ref 74–99)
GLUCOSE BLD MANUAL STRIP-MCNC: 298 MG/DL (ref 74–99)
GLUCOSE BLD MANUAL STRIP-MCNC: 372 MG/DL (ref 74–99)
GLUCOSE BLD MANUAL STRIP-MCNC: 486 MG/DL (ref 74–99)
GLUCOSE SERPL-MCNC: 214 MG/DL (ref 74–99)
GOOSEFOOT IGE QN: <0.1 KU/L
HCT VFR BLD AUTO: 41.5 % (ref 36–46)
HGB BLD-MCNC: 13.7 G/DL (ref 12–16)
IMM GRANULOCYTES # BLD AUTO: 0.04 X10*3/UL (ref 0–0.7)
IMM GRANULOCYTES NFR BLD AUTO: 0.5 % (ref 0–0.9)
JOHNSON GRASS IGE QN: <0.1 KU/L
KENT BLUE GRASS IGE QN: <0.1 KU/L
LONDON PLANE IGE QN: <0.1 KU/L
LYMPHOCYTES # BLD AUTO: 2.09 X10*3/UL (ref 1.2–4.8)
LYMPHOCYTES NFR BLD AUTO: 27.1 %
MCH RBC QN AUTO: 31.1 PG (ref 26–34)
MCHC RBC AUTO-ENTMCNC: 33 G/DL (ref 32–36)
MCV RBC AUTO: 94 FL (ref 80–100)
MONOCYTES # BLD AUTO: 0.38 X10*3/UL (ref 0.1–1)
MONOCYTES NFR BLD AUTO: 4.9 %
MT JUNIPER IGE QN: <0.1 KU/L
NEUTROPHILS # BLD AUTO: 4.96 X10*3/UL (ref 1.2–7.7)
NEUTROPHILS NFR BLD AUTO: 64.5 %
NRBC BLD-RTO: 0 /100 WBCS (ref 0–0)
P NOTATUM IGE QN: <0.1 KU/L
PECAN/HICK TREE IGE QN: <0.1 KU/L
PHOSPHATE SERPL-MCNC: 3.9 MG/DL (ref 2.5–4.9)
PLATELET # BLD AUTO: 288 X10*3/UL (ref 150–450)
POTASSIUM SERPL-SCNC: 3.6 MMOL/L (ref 3.5–5.3)
RBC # BLD AUTO: 4.41 X10*6/UL (ref 4–5.2)
RIBOSOMAL P AB SER-ACNC: <0.2 AI
ROACH IGE QN: <0.1 KU/L
SALTWORT IGE QN: <0.1 KU/L
SHEEP SORREL IGE QN: <0.1 KU/L
SILVER BIRCH IGE QN: <0.1 KU/L
SODIUM SERPL-SCNC: 136 MMOL/L (ref 136–145)
TIMOTHY IGE QN: <0.1 KU/L
TOTAL IGE SMQN RAST: 135 KU/L
WBC # BLD AUTO: 7.7 X10*3/UL (ref 4.4–11.3)
WHITE ASH IGE QN: <0.1 KU/L
WHITE ELM IGE QN: 0.13 KU/L
WHITE MULBERRY IGE QN: 0.18 KU/L
WHITE OAK IGE QN: <0.1 KU/L

## 2024-12-20 PROCEDURE — 36415 COLL VENOUS BLD VENIPUNCTURE: CPT | Performed by: INTERNAL MEDICINE

## 2024-12-20 PROCEDURE — S4991 NICOTINE PATCH NONLEGEND: HCPCS | Performed by: INTERNAL MEDICINE

## 2024-12-20 PROCEDURE — 85025 COMPLETE CBC W/AUTO DIFF WBC: CPT | Performed by: INTERNAL MEDICINE

## 2024-12-20 PROCEDURE — 80069 RENAL FUNCTION PANEL: CPT | Performed by: INTERNAL MEDICINE

## 2024-12-20 PROCEDURE — 99232 SBSQ HOSP IP/OBS MODERATE 35: CPT | Performed by: HOSPITALIST

## 2024-12-20 PROCEDURE — 2500000001 HC RX 250 WO HCPCS SELF ADMINISTERED DRUGS (ALT 637 FOR MEDICARE OP): Performed by: INTERNAL MEDICINE

## 2024-12-20 PROCEDURE — 2500000004 HC RX 250 GENERAL PHARMACY W/ HCPCS (ALT 636 FOR OP/ED): Performed by: HOSPITALIST

## 2024-12-20 PROCEDURE — 97535 SELF CARE MNGMENT TRAINING: CPT | Mod: GO

## 2024-12-20 PROCEDURE — 82947 ASSAY GLUCOSE BLOOD QUANT: CPT

## 2024-12-20 PROCEDURE — 2500000002 HC RX 250 W HCPCS SELF ADMINISTERED DRUGS (ALT 637 FOR MEDICARE OP, ALT 636 FOR OP/ED): Performed by: INTERNAL MEDICINE

## 2024-12-20 PROCEDURE — 2500000002 HC RX 250 W HCPCS SELF ADMINISTERED DRUGS (ALT 637 FOR MEDICARE OP, ALT 636 FOR OP/ED): Performed by: REGISTERED NURSE

## 2024-12-20 PROCEDURE — RXMED WILLOW AMBULATORY MEDICATION CHARGE

## 2024-12-20 PROCEDURE — 2500000005 HC RX 250 GENERAL PHARMACY W/O HCPCS: Performed by: STUDENT IN AN ORGANIZED HEALTH CARE EDUCATION/TRAINING PROGRAM

## 2024-12-20 PROCEDURE — 99239 HOSP IP/OBS DSCHRG MGMT >30: CPT | Performed by: INTERNAL MEDICINE

## 2024-12-20 PROCEDURE — 2500000004 HC RX 250 GENERAL PHARMACY W/ HCPCS (ALT 636 FOR OP/ED): Performed by: INTERNAL MEDICINE

## 2024-12-20 PROCEDURE — 94640 AIRWAY INHALATION TREATMENT: CPT

## 2024-12-20 RX ORDER — INSULIN GLARGINE 100 [IU]/ML
30 INJECTION, SOLUTION SUBCUTANEOUS DAILY
Status: DISCONTINUED | OUTPATIENT
Start: 2024-12-21 | End: 2024-12-20 | Stop reason: HOSPADM

## 2024-12-20 RX ORDER — IPRATROPIUM BROMIDE AND ALBUTEROL SULFATE 2.5; .5 MG/3ML; MG/3ML
3 SOLUTION RESPIRATORY (INHALATION) 4 TIMES DAILY PRN
Qty: 360 ML | Refills: 2 | Status: SHIPPED | OUTPATIENT
Start: 2024-12-20

## 2024-12-20 RX ORDER — HYDROCODONE BITARTRATE AND HOMATROPINE METHYLBROMIDE ORAL SOLUTION 5; 1.5 MG/5ML; MG/5ML
5 LIQUID ORAL EVERY 6 HOURS PRN
Qty: 120 ML | Refills: 0 | Status: SHIPPED | OUTPATIENT
Start: 2024-12-20 | End: 2024-12-20

## 2024-12-20 RX ORDER — INSULIN GLARGINE 100 [IU]/ML
10 INJECTION, SOLUTION SUBCUTANEOUS ONCE
Status: COMPLETED | OUTPATIENT
Start: 2024-12-20 | End: 2024-12-20

## 2024-12-20 RX ORDER — NEBULIZER AND COMPRESSOR
EACH MISCELLANEOUS
Qty: 1 EACH | Refills: 0 | OUTPATIENT
Start: 2024-12-20

## 2024-12-20 RX ORDER — LORAZEPAM 0.5 MG/1
0.5 TABLET ORAL EVERY 6 HOURS PRN
Status: DISCONTINUED | OUTPATIENT
Start: 2024-12-20 | End: 2024-12-20 | Stop reason: HOSPADM

## 2024-12-20 RX ORDER — HYDROXYZINE HYDROCHLORIDE 25 MG/1
25 TABLET, FILM COATED ORAL EVERY 4 HOURS PRN
Qty: 30 TABLET | Refills: 0 | Status: SHIPPED | OUTPATIENT
Start: 2024-12-20 | End: 2024-12-26 | Stop reason: SDUPTHER

## 2024-12-20 RX ORDER — HYDROCODONE BITARTRATE AND HOMATROPINE METHYLBROMIDE ORAL SOLUTION 5; 1.5 MG/5ML; MG/5ML
5 LIQUID ORAL EVERY 6 HOURS PRN
Qty: 120 ML | Refills: 0 | OUTPATIENT
Start: 2024-12-20

## 2024-12-20 RX ORDER — HYDROCODONE BITARTRATE AND HOMATROPINE METHYLBROMIDE ORAL SOLUTION 5; 1.5 MG/5ML; MG/5ML
5 LIQUID ORAL EVERY 6 HOURS PRN
Qty: 120 ML | Refills: 0 | Status: SHIPPED | OUTPATIENT
Start: 2024-12-20 | End: 2024-12-26 | Stop reason: SDUPTHER

## 2024-12-20 RX ORDER — INSULIN GLARGINE 100 [IU]/ML
20 INJECTION, SOLUTION SUBCUTANEOUS ONCE
Status: CANCELLED | OUTPATIENT
Start: 2024-12-19

## 2024-12-20 RX ORDER — PREDNISONE 10 MG/1
TABLET ORAL
Qty: 140 TABLET | Refills: 0 | Status: SHIPPED | OUTPATIENT
Start: 2024-12-21 | End: 2025-02-15

## 2024-12-20 RX ORDER — INSULIN GLARGINE 100 [IU]/ML
40 INJECTION, SOLUTION SUBCUTANEOUS EVERY EVENING
Status: CANCELLED | OUTPATIENT
Start: 2024-12-20

## 2024-12-20 ASSESSMENT — COGNITIVE AND FUNCTIONAL STATUS - GENERAL
CLIMB 3 TO 5 STEPS WITH RAILING: A LITTLE
DRESSING REGULAR UPPER BODY CLOTHING: A LITTLE
HELP NEEDED FOR BATHING: A LITTLE
TOILETING: A LITTLE
DRESSING REGULAR LOWER BODY CLOTHING: A LITTLE
DAILY ACTIVITIY SCORE: 24
MOBILITY SCORE: 23
PERSONAL GROOMING: A LITTLE
DAILY ACTIVITIY SCORE: 19

## 2024-12-20 ASSESSMENT — PAIN SCALES - GENERAL: PAINLEVEL_OUTOF10: 0 - NO PAIN

## 2024-12-20 ASSESSMENT — ACTIVITIES OF DAILY LIVING (ADL): HOME_MANAGEMENT_TIME_ENTRY: 14

## 2024-12-20 NOTE — NURSING NOTE
Blood glucose 486 pt given 10 units of lispro per sliding scale. Dr. Tomlinson present on unit and notified. Will continue to assess and provide nursing care.

## 2024-12-20 NOTE — DISCHARGE INSTRUCTIONS
Please take the prednisone with the new dosing.  You will notice that you will decrease the dose every 2 weeks x 1 pill or 10 mg.  You absolutely must have close follow-up with pulmonology.

## 2024-12-20 NOTE — PROGRESS NOTES
Occupational Therapy    Occupational Therapy Treatment    Name: Alanna Hickman  MRN: [id number]  Department: [address]  Room: [address] [place] [address]  Date: 12/20/24  Time Calculation  Start Time: 1038  Stop Time: 1052  Time Calculation (min): 14 min    Assessment:  OT Assessment: limited participation in therapy session secondary to not feeling well.  Prognosis: Fair  Barriers to Discharge Home: Caregiver assistance  Evaluation/Treatment Tolerance: Patient limited by fatigue  Medical Staff Made Aware: Yes  End of Session Communication: Bedside nurse  End of Session Patient Position: Bed, 3 rail up, Alarm off, not on at start of session  Plan:  Treatment Interventions: ADL retraining, Functional transfer training, UE strengthening/ROM, Endurance training, Patient/family training  OT Frequency: 3 times per week  OT Discharge Recommendations: Low intensity level of continued care (rollator)  Equipment Recommended upon Discharge: Other (comment)  OT Recommended Transfer Status: Assist of 1  OT - OK to Discharge: Yes    Subjective   Previous Visit Info:  OT Last Visit  OT Received On: 12/20/24  General:  General  Family/Caregiver Present: No  Prior to Session Communication: Bedside nurse  Patient Position Received: Bed, 3 rail up, Alarm off, not on at start of session  Preferred Learning Style: verbal, visual  General Comment: Cleared by nursing. Initiatlly declining therapy, however requesting A to the bathroom.  Precautions:  Medical Precautions: Fall precautions, Oxygen therapy device and L/min, Infection precautions, Other (comment) (4lpm O2, contact +)    Vital Signs (Past 2hrs)                Pain Assessment:        Objective   Cognition:  Overall Cognitive Status: Within Functional Limits  Orientation Level: Oriented X4  Activities of Daily Living:      Grooming  Grooming Level of Assistance: Independent    LE Dressing  LE Dressing: Yes  Shoe Level of Assistance: Close supervision  LE Dressing Where Assessed: Edge of bed  LE  Dressing Comments: pt able to don/doff socks utilizing figure 4 technique with SPV    Toileting  Toileting Level of Assistance: Close supervision  Where Assessed: Bedside commode  Toileting Comments: Pt able to pull pants up/down and complete hygiene without device with no LOB       Bed Mobility/Transfers: Bed Mobility 1  Bed Mobility 1: Supine to sitting  Level of Assistance 1: Close supervision  Bed Mobility Comments 1: supine<>EOB with SPV with no device use.    Transfers  Transfer: Yes  Transfer 1  Transfer From 1: Bed to, Toilet to  Transfer to 1: Toilet, Bed  Technique 1: Stand pivot  Transfer Level of Assistance 1: Close supervision  Trials/Comments 1: Completed EOB<>TOILET SPT wtih SPV wtih no LOB         Outcome Measures:  [facility] Daily Activity  Putting on and taking off regular lower body clothing: A little  Bathing (including washing, rinsing, drying): A little  Putting on and taking off regular upper body clothing: A little  Toileting, which includes using toilet, bedpan or urinal: A little  Taking care of personal grooming such as brushing teeth: A little  Eating Meals: None  Daily Activity - Total Score: 19        Education Documentation  ADL Training, taught by Saranya Talamantes OT at 12/20/2024 12:30 PM.  Learner: Patient  Readiness: Eager  Method: Explanation  Response: Verbalizes Understanding  Comment: educated pt on how to complete LB dressing via figure 4 technique    Education Comments  Educated pt on call light use, fall precautions, and OT POC      Goals:  Encounter Problems       Encounter Problems (Active)       ADLs       Pt will complete ADL tasks at mod I with use of AE prn  (Progressing)       Start:  12/19/24    Expected End:  01/16/25               Functional Mobility       Pt will perform functional mobility household distances at mod I with use of LRAD.    (Progressing)       Start:  12/19/24    Expected End:  01/16/25               OT Transfers       Pt will perform functional transfers  at mod I. (Progressing)       Start:  12/19/24    Expected End:  01/16/25

## 2024-12-20 NOTE — PROGRESS NOTES
Physical Therapy                 Therapy Communication Note    Patient Name: Alanna Hickman  MRN: [id number]  Department: [address]  Room: [address] [place] [address]  Today's Date: 12/20/2024     Discipline: Physical Therapy    PT Missed Visit: Yes     Missed Visit Reason: Missed Visit Reason: Patient refused (politely declined. pt worked with OT reporting fatigue)    Missed Time: Attempt

## 2024-12-20 NOTE — CARE PLAN
The patient's goals for the shift include rest  The clinical goals for the shift include monitor vitals, and blood sugars

## 2024-12-20 NOTE — ASSESSMENT & PLAN NOTE
Secondary to atypical pneumonia with possible underlying lung disease; PFTs pending  Remains on 4 L currently and saturating well; O2 home going today to see if major desaturations.   Expect to dc to home on O2

## 2024-12-20 NOTE — ASSESSMENT & PLAN NOTE
With mycoplasma IgM positive on recent admission  Reviewed CT with pulmonology, actually appears somewhat improved  Finished a course of antibiotics  Per pulmonology, decrease steroids to prednisone 40 mg with 10 mg taper every two weeks and stay at 20 mg until outpatient follow up with pulmonology.   Pulmonology consult appreciated

## 2024-12-20 NOTE — DISCHARGE SUMMARY
Discharge Diagnosis  Acute hypoxic respiratory failure (Multi)  Mycoplasma pneumonia  Issues Requiring Follow-Up  Finishing a long prednisone taper, decreasing by 10 mg every 2 weeks.    Follow-up with pulmonology    Discharge Meds     Medication List      START taking these medications     hydrocodone-homatropine 5-1.5 mg/5 mL syrup; Commonly known as: Hycodan;   Take 5 mL by mouth every 6 hours if needed for cough.   hydrOXYzine HCL 25 mg tablet; Commonly known as: Atarax; Take 1 tablet   (25 mg) by mouth every 4 hours if needed for anxiety.   ipratropium-albuteroL 0.5-2.5 mg/3 mL nebulizer solution; Commonly known   as: Duo-Neb; Take 3 mL by nebulization 4 times a day as needed for   wheezing or shortness of breath.     CHANGE how you take these medications     predniSONE 10 mg tablet; Commonly known as: Deltasone; Take 4 tablets   (40 mg) by mouth once daily for 14 days, THEN 3 tablets (30 mg) once daily   for 14 days, THEN 2 tablets (20 mg) once daily for 14 days, THEN 1 tablet   (10 mg) once daily for 14 days.; Start taking on: December 21, 2024; What   changed: medication strength, See the new instructions.     CONTINUE taking these medications     albuterol 90 mcg/actuation inhaler; Commonly known as: Ventolin HFA;   Inhale 2 puffs every 6 hours if needed for wheezing.   blood-glucose meter misc; Test glucose twice daily as directed   cyclobenzaprine 10 mg tablet; Commonly known as: Flexeril   Farxiga 10 mg; Generic drug: dapagliflozin propanediol; TAKE ONE TABLET   BY MOUTH ONCE DAILY   FreeStyle Jignesh 3 [place] misc; Generic drug: blood-glucose   meter,continuous; Use as instructed to read glucose   FreeStyle Jignesh 3 Sensor device; Generic drug: blood-glucose sensor;   Change sensor every 14 days as directed   inhalational spacing device inhaler; Use as directed with inhalers   insulin degludec 100 unit/mL (3 mL) injection; Commonly known as:   Tresiba FlexTouch U-100; Inject 40 Units under the skin once  "daily at   bedtime. Take as directed per insulin instructions.   lancets misc; Test sugars twice daily   metFORMIN  mg 24 hr tablet; Commonly known as: Glucophage-XR; TAKE   3 TABLETS BY MOUTH EVERY DAY   nicotine 21 mg/24 hr patch; Commonly known as: Nicoderm CQ; Place 1   patch over 24 hours on the skin once every 24 hours for 21 days.   OneTouch Ultra Test strip; Generic drug: blood sugar diagnostic; USE TO   TEST BLOOD SUGARS TWICE DAILY AS DIRECTED   pantoprazole 20 mg EC tablet; Commonly known as: ProtoNix; Take 1 tablet   (20 mg) by mouth once daily in the morning. Take before meals. Do not   crush, chew, or split.   Plavix 75 mg tablet; Generic drug: clopidogrel   Sure Comfort Pen Needle 31 gauge x 3/16\" needle; Generic drug: pen   needle, diabetic; use once daily as directed     STOP taking these medications     cefuroxime 500 mg tablet; Commonly known as: Ceftin       Test Results Pending At Discharge  Pending Labs       Order Current Status    SHARA with Reflex to NORMA In process    ANCA-Associated Vasculitis Profile (ANCA,MPO,PR3) In process    Extended Myositis Panel In process    Glomerular basement membrane antibodies In process    Hypersensitivity Pneumonitis Panel In process    Blood Culture Preliminary result    Blood Culture Preliminary result            Hospital Course   44-year-old female who presented after leaving against medical vice.  Patient was admitted at this facility for about 9 days with hypoxemic respiratory failure secondary to likely mycoplasma pneumonia, requiring high flow nasal cannula.  The patient left AGAINST MEDICAL ADVICE when she was still on 4 L of oxygen but not clinically ready to go, and she was discharged with oxygen and with prednisone, the patient returned within about 12 hours and was readmitted.  During hospitalization, no new antibiotics were started as she already finished a course of Zosyn and azithromycin for atypical coverage.  She was placed on " Solu-Medrol, transition to prednisone and this was tolerated well.  On the day of discharge, she was assessed for oxygen and needed 2 L at baseline and 3 L ambulating.  Patient is discharged home with a long prednisone taper starting at 40 mg tapering down by 10 mg every 2 weeks, and has instructions to follow-up with pulmonology.    Pertinent Physical Exam At Time of Discharge  Physical Exam  See progress note from today  Outpatient Follow-Up  No future appointments.      Ty Tomlinson, DO

## 2024-12-20 NOTE — SIGNIFICANT EVENT
Home Oxygen certification per Dr. Tomlinson:    On RA at rest, the pt's SPO2 dropped to 87% and she was placed on NC 2L which increased her SPO2 to 94%.    During exertion on NC 2L, her SPO2 dropped to 88% and she was placed on NC 3L which increased her SPO2 to 93%. No complications.     [facility] was contacted, the pt spoke with Cathryn Posadas the [place] representative. The RN and Dr. Tomlinson were notified.

## 2024-12-20 NOTE — PROGRESS NOTES
"Pulmonary Daily Progress Note   Subjective    Alanna Hickman is a 44 y.o. year old female patient known with asthma, CVA on plavix, DM, smoker, possible spondyloarthropathy, severe SANGEETA, prescribed PAP therapy but had trouble with machine admitted on 12/18/2024 with recurrent dyspnea, hypoxemia.     Interval History:  Patient feeling much better than yesterday.    Meds    Scheduled medications  clopidogrel, 75 mg, oral, Daily  dapagliflozin propanediol, 10 mg, oral, Daily  enoxaparin, 40 mg, subcutaneous, Daily  insulin glargine, 20 Units, subcutaneous, Daily  insulin lispro, 0-10 Units, subcutaneous, Before meals & nightly  ipratropium-albuteroL, 3 mL, nebulization, TID  nicotine, 1 patch, transdermal, Daily  oxygen, , inhalation, Continuous - Inhalation  pantoprazole, 20 mg, oral, Daily  predniSONE, 40 mg, oral, Daily      Continuous medications       PRN medications  PRN medications: acetaminophen **OR** acetaminophen **OR** acetaminophen, benzocaine-menthol, cyclobenzaprine, hydrocodone-homatropine, hydrOXYzine HCL, ipratropium-albuteroL, LORazepam, melatonin, ondansetron ODT **OR** ondansetron, oxygen     Objective    Blood pressure 126/75, pulse 74, temperature 36.7 °C (98.1 °F), temperature source Oral, resp. rate 17, height 1.702 m (5' 7\"), weight 107 kg (235 lb), SpO2 99%.   Physical Exam   GENERAL: normal appearance. well nourished. No respiratory distress  HEAD/SINUSES: no sinus tenderness  OROPHARYNX: Moist mucosa, no thrush or lesions  NECK: no JVD, midline trachea without stridor. Thyroid not enlarged  LYMPH NODES : none felt in the cervical, submandibular or supraclavicular regions  LUNGS: Symmetric chest. Good excursion. Equal breath sounds. no wheezing. no crackles or rhonchi  CARDIAC: normal S1 and S2; no gallops, rubs or murmurs. Regular rate and rhythm  EXTREMITIES: No edema, no varicose veins  NEURO: grossly normal mental status, CN reflexes and motor strength.   SKIN: Skin turgor normal. No " rashes or lesions.   PSYCH: Normal affect    Intake/Output Summary (Last 24 hours) at 12/20/2024 0821  Last data filed at 12/20/2024 0000  Gross per 24 hour   Intake 1820 ml   Output 2 ml   Net 1818 ml     Labs:   Results from last 72 hours   Lab Units 12/20/24  0459 12/19/24  0538 12/18/24  0354   SODIUM mmol/L 136 136 133*   POTASSIUM mmol/L 3.6 4.5 4.1   CHLORIDE mmol/L 100 102 96*   CO2 mmol/L 30 28 30   BUN mg/dL 14 15 18   CREATININE mg/dL 0.45* 0.34* 0.47*   GLUCOSE mg/dL 214* 241* 257*   CALCIUM mg/dL 8.7 9.1 9.6   ANION GAP mmol/L 10 11 11   EGFR mL/min/1.73m*2 >90 >90 >90   PHOSPHORUS mg/dL 3.9 3.3  --       Results from last 72 hours   Lab Units 12/20/24  0459 12/19/24  0538 12/18/24  0354   WBC AUTO x10*3/uL 7.7 7.7 9.7   HEMOGLOBIN g/dL 13.7 14.4 16.8*   HEMATOCRIT % 41.5 42.9 50.1*   PLATELETS AUTO x10*3/uL 288 277 372   NEUTROS PCT AUTO % 64.5 87.9 73.9   LYMPHS PCT AUTO % 27.1 9.7 18.9   MONOS PCT AUTO % 4.9 1.4 4.8   EOS PCT AUTO % 2.6 0.3 1.5          Results from last 72 hours   Lab Units 12/18/24  0354   POCT PH, VENOUS pH 7.39   POCT PCO2, VENOUS mm Hg 59*   POCT PO2, VENOUS mm Hg 21*      Micro/ID:   Lab Results   Component Value Date    URINECULTURE No significant growth 11/20/2023    BLOODCULT No growth at 1 day 12/18/2024    BLOODCULT No growth at 1 day 12/18/2024     Summary of key imaging results from the last 24 hours  CTA chest (12/18/24)  IMPRESSION:  No evidence of acute pulmonary embolism.  Patchy bilateral consolidation, likely infectious/inflammatory in  etiology, which appears slightly improved in the lower lobes but  worsened in the right upper lobe compared to prior.    Impression   Alanna Hickman is a 44 y.o. year old female patient is being seen by the pulmonary service for   Acute hypoxemic respiratory failure - Due to GGO and nodular infiltrates seen on CT, due to resolving atypical PNA versus other infectious/inflammatory process. Overall, repeat CT looks  improved.  Bilateral infiltrates, micronodular densities  Severe SANGEETA - Never tolerated or stabilized on PAP due to interface intolerance.    Recommendations   As follows:  Continue prednisone 40 mg daily; would plan for taper of 10 mg every 2 weeks (e.g., prednisone 40mg x2 weeks --> 30 mg x2 weeks --> 20 mg x2 weeks) and stay at 20 mg daily until seen by pulmonology  Labs ordered and pending for ILD/CTD workup which can be followed as an outpatient  SW/CM consult to ensure adequate oxygen devices at home (e.g., portable concentrator) based on walking pulse oximetry test  Orders placed for repeat CT chest, repeat walking oximetry and pulmonary clinic follow-up in ~1 month  Pulmonary consults will follow peripherally until discharge    Andrade Aguirre MD   12/20/24 at 8:21 AM     -Only the Medical problems listed under impression were addressed today.   -Please contact primary team for all other concerns and medical problem  -Thank you for your consult       Disclaimer: Documentation completed with the information available at the time of input. Parts of this note may have been scribed or generated using voice dictation software, Dragon.  Homophonic errors may exist.  Please contact me directly if clarification is needed. The times in the chart may not be reflective of actual patient care times, interventions, or procedures. Documentation occurs after the physical care of the patient.

## 2024-12-20 NOTE — PROGRESS NOTES
Alanna Hickman is a 44 y.o. female on day 2 of admission presenting with Acute hypoxic respiratory failure (Multi).      Subjective              Objective     Last Recorded Vitals  /75 (BP Location: Right arm, Patient Position: Lying)   Pulse 74   Temp 36.7 °C (98.1 °F) (Oral)   Resp 17   Wt 107 kg (235 lb)   SpO2 99%   Intake/Output last 3 Shifts:    Intake/Output Summary (Last 24 hours) at 12/20/2024 1154  Last data filed at 12/20/2024 0900  Gross per 24 hour   Intake 1580 ml   Output 2 ml   Net 1578 ml       Admission Weight  Weight: 107 kg (235 lb) (12/18/24 0338)    Daily Weight  12/18/24 : 107 kg (235 lb)    Image Results  ECG 12 Lead  Sinus tachycardia  Possible Left atrial enlargement  Low voltage QRS  Left anterior fascicular block  QTcB >= 480 msec  Abnormal ECG  When compared with ECG of 08-DEC-2024 18:09,  No significant change was found  Confirmed by Jax Maravilla ([id number]) on 12/19/2024 2:21:49 PM      Physical Exam  Vitals and nursing note reviewed.   Constitutional:       General: She is not in acute distress.     Appearance: She is obese. She is ill-appearing.   HENT:      Head: Normocephalic and atraumatic.      Right Ear: External ear normal.      Left Ear: External ear normal.      Nose: Nose normal.      Mouth/Throat:      Mouth: Mucous membranes are moist.   Eyes:      General: No scleral icterus.        Right eye: No discharge.         Left eye: No discharge.      Conjunctiva/sclera: Conjunctivae normal.   Cardiovascular:      Rate and Rhythm: Normal rate and regular rhythm.      Pulses: Normal pulses.      Heart sounds: Normal heart sounds. No murmur heard.  Pulmonary:      Effort: Pulmonary effort is normal. No respiratory distress.      Breath sounds: No rales. Wheezes: scattered expiratory.  Abdominal:      General: Abdomen is flat. There is no distension.      Palpations: Abdomen is soft.      Tenderness: There is no abdominal tenderness.   Musculoskeletal:         General:  No tenderness.      Right lower leg: No edema.      Left lower leg: No edema.   Skin:     General: Skin is warm and dry.      Capillary Refill: Capillary refill takes less than 2 seconds.      Findings: No rash.   Neurological:      General: No focal deficit present.      Mental Status: She is alert and oriented to person, place, and time. Mental status is at baseline.   Psychiatric:      Comments: Anxious affect         Relevant Results      Scheduled medications  clopidogrel, 75 mg, oral, Daily  dapagliflozin propanediol, 10 mg, oral, Daily  enoxaparin, 40 mg, subcutaneous, Daily  [START ON 12/21/2024] insulin glargine, 30 Units, subcutaneous, Daily  insulin lispro, 0-10 Units, subcutaneous, Before meals & nightly  ipratropium-albuteroL, 3 mL, nebulization, TID  nicotine, 1 patch, transdermal, Daily  oxygen, , inhalation, Continuous - Inhalation  pantoprazole, 20 mg, oral, Daily  predniSONE, 40 mg, oral, Daily      Continuous medications     PRN medications  PRN medications: acetaminophen **OR** acetaminophen **OR** acetaminophen, benzocaine-menthol, cyclobenzaprine, hydrocodone-homatropine, hydrOXYzine HCL, ipratropium-albuteroL, LORazepam, melatonin, ondansetron ODT **OR** ondansetron, oxygen      Results for orders placed or performed during the hospital encounter of 12/18/24 (from the past 24 hours)   POCT GLUCOSE   Result Value Ref Range    POCT Glucose 383 (H) 74 - 99 mg/dL   POCT GLUCOSE   Result Value Ref Range    POCT Glucose 433 (H) 74 - 99 mg/dL   POCT GLUCOSE   Result Value Ref Range    POCT Glucose 404 (H) 74 - 99 mg/dL   POCT GLUCOSE   Result Value Ref Range    POCT Glucose 298 (H) 74 - 99 mg/dL   POCT GLUCOSE   Result Value Ref Range    POCT Glucose 262 (H) 74 - 99 mg/dL   Renal Function Panel   Result Value Ref Range    Glucose 214 (H) 74 - 99 mg/dL    Sodium 136 136 - 145 mmol/L    Potassium 3.6 3.5 - 5.3 mmol/L    Chloride 100 98 - 107 mmol/L    Bicarbonate 30 21 - 32 mmol/L    Anion Gap 10 10 -  20 mmol/L    Urea Nitrogen 14 6 - 23 mg/dL    Creatinine 0.45 (L) 0.50 - 1.05 mg/dL    eGFR >90 >60 mL/min/1.73m*2    Calcium 8.7 8.6 - 10.3 mg/dL    Phosphorus 3.9 2.5 - 4.9 mg/dL    Albumin 3.0 (L) 3.4 - 5.0 g/dL   CBC and Auto Differential   Result Value Ref Range    WBC 7.7 4.4 - 11.3 x10*3/uL    nRBC 0.0 0.0 - 0.0 /100 WBCs    RBC 4.41 4.00 - 5.20 x10*6/uL    Hemoglobin 13.7 12.0 - 16.0 g/dL    Hematocrit 41.5 36.0 - 46.0 %    MCV 94 80 - 100 fL    MCH 31.1 26.0 - 34.0 pg    MCHC 33.0 32.0 - 36.0 g/dL    RDW 13.4 11.5 - 14.5 %    Platelets 288 150 - 450 x10*3/uL    Neutrophils % 64.5 40.0 - 80.0 %    Immature Granulocytes %, Automated 0.5 0.0 - 0.9 %    Lymphocytes % 27.1 13.0 - 44.0 %    Monocytes % 4.9 2.0 - 10.0 %    Eosinophils % 2.6 0.0 - 6.0 %    Basophils % 0.4 0.0 - 2.0 %    Neutrophils Absolute 4.96 1.20 - 7.70 x10*3/uL    Immature Granulocytes Absolute, Automated 0.04 0.00 - 0.70 x10*3/uL    Lymphocytes Absolute 2.09 1.20 - 4.80 x10*3/uL    Monocytes Absolute 0.38 0.10 - 1.00 x10*3/uL    Eosinophils Absolute 0.20 0.00 - 0.70 x10*3/uL    Basophils Absolute 0.03 0.00 - 0.10 x10*3/uL   POCT GLUCOSE   Result Value Ref Range    POCT Glucose 223 (H) 74 - 99 mg/dL   POCT GLUCOSE   Result Value Ref Range    POCT Glucose 486 (H) 74 - 99 mg/dL         No results found.             Assessment/Plan          Assessment & Plan  Acute hypoxic respiratory failure (Multi)  Secondary to atypical pneumonia with possible underlying lung disease; PFTs pending  Remains on 4 L currently and saturating well; O2 home going today to see if major desaturations.   Expect to dc to home on O2    Pneumonia of both lungs due to Mycoplasma pneumoniae  With mycoplasma IgM positive on recent admission  Reviewed CT with pulmonology, actually appears somewhat improved  Finished a course of antibiotics  Per pulmonology, decrease steroids to prednisone 40 mg with 10 mg taper every two weeks and stay at 20 mg until outpatient follow up with  pulmonology.   Pulmonology consult appreciated    Type 2 diabetes mellitus, with long-term current use of insulin  B today (earlier low 200s)  Will increase basal to 30u (home is 40, decreased due to inpatient stay).     Cigarette nicotine dependence  Nicoderm patch      Dispo: will see how she does with home O2 cert today; depending on results, possible dc              Ty Tomlinson, DO

## 2024-12-20 NOTE — ASSESSMENT & PLAN NOTE
B today (earlier low 200s)  Will increase basal to 30u (home is 40, decreased due to inpatient stay).

## 2024-12-20 NOTE — CARE PLAN
Spoke with pt about discharge planning. Offered pt HHC/PT/nursing; pt is declining services.    DISCHARGE PLAN; HOME

## 2024-12-20 NOTE — NURSING NOTE
Patient discharged home in stable condition, no sign of acute distress. Discharge instructions reviewed at bedside with patient, she verbalizes understanding of discharge instructions. Prescriptions [facility] by  [facility] and delivered at bedside. Patient given oxygen tank for discharge and Mildred was made aware of patient's release, IV and tele removed.

## 2024-12-20 NOTE — CARE PLAN
The patient's goals for the shift include N/A    The clinical goals for the shift include Continue to maintain oxygen sats above 90% and monitor blood sugars

## 2024-12-22 LAB
ANCA AB PATTERN SER IF-IMP: NORMAL
ANCA IGG TITR SER IF: NORMAL {TITER}
BACTERIA BLD CULT: NORMAL
BACTERIA BLD CULT: NORMAL
BM IGG SER IF-ACNC: 0 AU/ML (ref 0–19)
MYELOPEROXIDASE AB SER-ACNC: 0 AU/ML (ref 0–19)
PROTEINASE3 AB SER-ACNC: 0 AU/ML (ref 0–19)

## 2024-12-23 ENCOUNTER — PATIENT OUTREACH (OUTPATIENT)
Dept: PRIMARY CARE | Facility: CLINIC | Age: 44
End: 2024-12-23
Payer: COMMERCIAL

## 2024-12-23 NOTE — PROGRESS NOTES
Discharge Facility:  [facility]     Discharge Diagnosis:    Acute hypoxic respiratory failure (Multi)  Mycoplasma pneumonia  Issues Requiring Follow-Up  Finishing a long prednisone taper, decreasing by 10 mg every 2 weeks.    Admission Date: 12/18/2024   Discharge Date:  12/20/2024     PCP Appointment Date: 12/26/2024       Specialist Appointment Date:     Follow up with Andrade Aguirre MD (Pulmonary Disease)     Hospital Encounter and Summary Linked: Yes    See discharge assessment below for further details     Engagement  Call Start Time: 1517 (12/23/2024  3:17 PM)    Medications  Medications reviewed with patient/caregiver?: Yes (12/23/2024  3:17 PM)  Is the patient having any side effects they believe may be caused by any medication additions or changes?: No (12/23/2024  3:17 PM)  Does the patient have all medications ordered at discharge?: Yes (12/23/2024  3:17 PM)  Care Management Interventions: No intervention needed (12/23/2024  3:17 PM)  Prescription Comments: START taking these medications     hydrocodone-homatropine 5-1.5 mg/5 mL syrup; Commonly known as: Hycodan;   Take 5 mL by mouth every 6 hours if needed for cough.   hydrOXYzine HCL 25 mg tablet; Commonly known as: Atarax; Take 1 tablet   (25 mg) by mouth every 4 hours if needed for anxiety.   ipratropium-albuteroL 0.5-2.5 mg/3 mL nebulizer solution; Commonly known   as: Duo-Neb; Take 3 mL by nebulization 4 times a day as needed for   wheezing or shortness of breath. (12/23/2024  3:17 PM)  Is the patient taking all medications as directed (includes completed medication regime)?: -- (Pt states has all meds) (12/23/2024  3:17 PM)  Care Management Interventions: Provided patient education (12/23/2024  3:17 PM)  Medication Comments: CHANGE how you take these medications     predniSONE 10 mg tablet; Commonly known as: Deltasone; Take 4 tablets   (40 mg) by mouth once daily for 14 days, THEN 3 tablets (30 mg) once daily   for 14 days, THEN 2 tablets (20 mg)  once daily for 14 days, THEN 1 tablet   (10 mg) once daily for 14 days.; Start taking on: December 21, 2024; What   changed: medication strength, See the new instructions.STOP taking these medications     cefuroxime 500 mg tablet; Commonly known as: Ceftin (12/23/2024  3:17 PM)    Appointments  Does the patient have a primary care provider?: Yes (12/23/2024  3:17 PM)  Care Management Interventions: Verified appointment date/time/provider (12/23/2024  3:17 PM)  Care Management Interventions: Advised to schedule with specialist (12/23/2024  3:17 PM)    Self Management  What is the home health agency?: NA (12/23/2024  3:17 PM)  What Durable Medical Equipment (DME) was ordered?: NA (12/23/2024  3:17 PM)    Patient Teaching  Does the patient have access to their discharge instructions?: Yes (12/23/2024  3:17 PM)  Care Management Interventions: Reviewed instructions with patient (12/23/2024  3:17 PM)  What is the patient's perception of their health status since discharge?: Improving (12/23/2024  3:17 PM)  Is the patient/caregiver able to teach back the hierarchy of who to call/visit for symptoms/problems? PCP, Specialist, Home Health nurse, Urgent Care, ED, 911: Yes (12/23/2024  3:17 PM)  Patient/Caregiver Education Comments: Patient aware of med changes along with PCP F/U 12/26 , patient encourgaed to call providers for any questions concerns  or change in condition , aware Pulmonary F/U needs dmitriy. (12/23/2024  3:17 PM)

## 2024-12-26 ENCOUNTER — APPOINTMENT (OUTPATIENT)
Dept: PRIMARY CARE | Facility: CLINIC | Age: 44
End: 2024-12-26
Payer: COMMERCIAL

## 2024-12-26 VITALS
DIASTOLIC BLOOD PRESSURE: 78 MMHG | WEIGHT: 226 LBS | OXYGEN SATURATION: 97 % | SYSTOLIC BLOOD PRESSURE: 128 MMHG | BODY MASS INDEX: 35.4 KG/M2 | HEART RATE: 93 BPM

## 2024-12-26 DIAGNOSIS — F17.210 CIGARETTE NICOTINE DEPENDENCE WITHOUT COMPLICATION: Primary | ICD-10-CM

## 2024-12-26 DIAGNOSIS — J15.7 PNEUMONIA OF BOTH UPPER LOBES DUE TO MYCOPLASMA PNEUMONIAE: ICD-10-CM

## 2024-12-26 DIAGNOSIS — Z79.4 TYPE 2 DIABETES MELLITUS WITH DIABETIC NEUROPATHY, WITH LONG-TERM CURRENT USE OF INSULIN: ICD-10-CM

## 2024-12-26 DIAGNOSIS — F41.9 ANXIETY: ICD-10-CM

## 2024-12-26 DIAGNOSIS — E11.40 TYPE 2 DIABETES MELLITUS WITH DIABETIC NEUROPATHY, WITH LONG-TERM CURRENT USE OF INSULIN: ICD-10-CM

## 2024-12-26 LAB
A FUMIGATUS1 AB SER QL ID: ABNORMAL
A FUMIGATUS6 AB SER QL ID: ABNORMAL
A PULLULANS AB SER QL ID: DETECTED
PIGEON SERUM AB QL ID: ABNORMAL
S RECTIVIRGULA AB SER QL ID: ABNORMAL

## 2024-12-26 PROCEDURE — 3051F HG A1C>EQUAL 7.0%<8.0%: CPT | Performed by: PHYSICIAN ASSISTANT

## 2024-12-26 PROCEDURE — 3074F SYST BP LT 130 MM HG: CPT | Performed by: PHYSICIAN ASSISTANT

## 2024-12-26 PROCEDURE — 99496 TRANSJ CARE MGMT HIGH F2F 7D: CPT | Performed by: PHYSICIAN ASSISTANT

## 2024-12-26 PROCEDURE — 3078F DIAST BP <80 MM HG: CPT | Performed by: PHYSICIAN ASSISTANT

## 2024-12-26 PROCEDURE — 4004F PT TOBACCO SCREEN RCVD TLK: CPT | Performed by: PHYSICIAN ASSISTANT

## 2024-12-26 RX ORDER — HYDROXYZINE HYDROCHLORIDE 25 MG/1
25 TABLET, FILM COATED ORAL EVERY 4 HOURS PRN
Qty: 30 TABLET | Refills: 1 | Status: SHIPPED | OUTPATIENT
Start: 2024-12-26 | End: 2024-12-27 | Stop reason: SDUPTHER

## 2024-12-26 RX ORDER — IBUPROFEN 200 MG
1 TABLET ORAL EVERY 24 HOURS
Qty: 14 PATCH | Refills: 0 | Status: SHIPPED | OUTPATIENT
Start: 2024-12-26 | End: 2025-01-25

## 2024-12-26 RX ORDER — HYDROCODONE BITARTRATE AND HOMATROPINE METHYLBROMIDE ORAL SOLUTION 5; 1.5 MG/5ML; MG/5ML
5 LIQUID ORAL EVERY 6 HOURS PRN
Qty: 120 ML | Refills: 0 | Status: SHIPPED | OUTPATIENT
Start: 2024-12-26

## 2024-12-26 RX ORDER — IBUPROFEN 200 MG
1 TABLET ORAL EVERY 24 HOURS
Qty: 21 PATCH | Refills: 0 | Status: SHIPPED | OUTPATIENT
Start: 2024-12-26 | End: 2025-01-25

## 2024-12-26 RX ORDER — INSULIN LISPRO 100 [IU]/ML
8 INJECTION, SOLUTION INTRAVENOUS; SUBCUTANEOUS
Qty: 15 ML | Refills: 1 | Status: SHIPPED | OUTPATIENT
Start: 2024-12-26

## 2024-12-26 RX ORDER — NICOTINE 7MG/24HR
1 PATCH, TRANSDERMAL 24 HOURS TRANSDERMAL EVERY 24 HOURS
Qty: 14 PATCH | Refills: 0 | Status: SHIPPED | OUTPATIENT
Start: 2024-12-26 | End: 2025-01-09

## 2024-12-26 ASSESSMENT — ENCOUNTER SYMPTOMS
SHORTNESS OF BREATH: 1
WHEEZING: 0
COUGH: 1
FATIGUE: 1
CHILLS: 0
FEVER: 0
CHEST TIGHTNESS: 0

## 2024-12-26 ASSESSMENT — PATIENT HEALTH QUESTIONNAIRE - PHQ9
1. LITTLE INTEREST OR PLEASURE IN DOING THINGS: NOT AT ALL
SUM OF ALL RESPONSES TO PHQ9 QUESTIONS 1 AND 2: 0
2. FEELING DOWN, DEPRESSED OR HOPELESS: NOT AT ALL

## 2024-12-26 ASSESSMENT — PAIN SCALES - GENERAL: PAINLEVEL_OUTOF10: 0-NO PAIN

## 2024-12-26 NOTE — PROGRESS NOTES
"Patient: Alanna Hickman  : 1980  PCP: Ansley Shetty PA-C  MRN: [id number]  Program: Transitional Care Management  Status: Enrolled  Effective Dates: 2024 - present  Responsible Staff: Ira Hatch  Social Drivers to be Addressed: Financial Resource Strain, Physical Activity, Social Connections, Tobacco Use         Alanna Hickman is a 44 y.o. female presenting today for follow-up after being discharged from the hospital 6 days ago. The main problem requiring admission was mycoplasma pneumonia, hypoxia.. The discharge summary and/or Transitional Care Management documentation was reviewed. Medication reconciliation was performed as indicated via the \"[place] as Reviewed\" timestamp.   Is on 2 liters of O2. Requests more cough syrup.  Doing a breathing treatment about 6 hours.   Alanna Hickman was contacted by Transitional Care Management services two days after her discharge. This encounter and supporting documentation was reviewed.  Am sugars are in the middle 300's. Was off Metformin in the hospital. Started back on Friday afternoon.  Breathing is slowly getting better.  She states she still having a good amount of rib pain from coughing.  Review of Systems   Constitutional:  Positive for fatigue. Negative for chills and fever.   Respiratory:  Positive for cough and shortness of breath. Negative for chest tightness and wheezing.        /78 (BP Location: Left arm)   Pulse 93   Wt 103 kg (226 lb)   SpO2 97%   BMI 35.40 kg/m²     Physical Exam  Constitutional:       Comments: Wearing her oxygen via NC   Eyes:      Extraocular Movements: Extraocular movements intact.      Pupils: Pupils are equal, round, and reactive to light.   Cardiovascular:      Rate and Rhythm: Normal rate and regular rhythm.      Pulses: Normal pulses.   Pulmonary:      Breath sounds: Decreased breath sounds and rhonchi present.   Skin:     General: Skin is warm.   Neurological:      General: No focal " deficit present.      Mental Status: She is alert and oriented to person, place, and time. Mental status is at baseline.   Psychiatric:         Mood and Affect: Mood normal.         Behavior: Behavior normal.         Thought Content: Thought content normal.         Judgment: Judgment normal.         The complexity of medical decision making for this patient's transitional care is high.    Assessment/Plan   Diagnoses and all orders for this visit:  Cigarette nicotine dependence without complication  -     nicotine (Nicoderm CQ) 21 mg/24 hr patch; Place 1 patch over 24 hours on the skin once every 24 hours.  -     nicotine (Nicoderm CQ) 14 mg/24 hr patch; Place 1 patch over 24 hours on the skin once every 24 hours.  -     nicotine (Nicoderm CQ) 7 mg/24 hr patch; Place 1 patch over 24 hours on the skin once every 24 hours for 14 days.  -     insulin lispro (HumaLOG) 100 unit/mL injection; Inject 8 Units under the skin 3 times daily (morning, midday, late afternoon). Take as directed per insulin instructions  Pneumonia of both upper lobes due to Mycoplasma pneumoniae  -     hydrOXYzine HCL (Atarax) 25 mg tablet; Take 1 tablet (25 mg) by mouth every 4 hours if needed for anxiety.  -     XR chest 2 views; Future  -     hydrocodone-homatropine (Hycodan) 5-1.5 mg/5 mL syrup; Take 5 mL by mouth every 6 hours if needed for cough.  Anxiety  -     hydrOXYzine HCL (Atarax) 25 mg tablet; Take 1 tablet (25 mg) by mouth every 4 hours if needed for anxiety.  Other orders  -     Follow Up In Primary Care - Established; Future  Her sugars have been crazy high probably above 500 according to her glucometer.  Will initiate her lispro started 8 units with each meal.  She will continue her Tresiba as well.  Follow-up with Nicole in 2 weeks.  She can be on prednisone for quite some time and will also repeat a chest x-ray in 2 weeks.  Will also follow-up with pulmonology.  States Atarax does help her better anxiety has been pretty heightened  with this recent 2 stents of hospital stay.

## 2024-12-27 DIAGNOSIS — F41.9 ANXIETY: ICD-10-CM

## 2024-12-27 DIAGNOSIS — J15.7 PNEUMONIA OF BOTH UPPER LOBES DUE TO MYCOPLASMA PNEUMONIAE: ICD-10-CM

## 2024-12-27 LAB
ANA SER QL: NEGATIVE
ANNOTATION COMMENT IMP: NORMAL
EJ AB SER QL: NEGATIVE
ENA JO1 IGG SER-ACNC: 1 AU/ML (ref 0–40)
ENA SS-A 60KD AB SER-ACNC: 0 AU/ML (ref 0–40)
ENA SS-A IGG SER QL: 2 AU/ML (ref 0–40)
FIBRILLARIN AB SER QL: NEGATIVE
KU AB SER QL: NEGATIVE
MDA5 AB SER QL LINE BLOT: NEGATIVE
MI2 AB SER QL: NEGATIVE
MJ AB SER QL LINE BLOT: NEGATIVE
OJ AB SER QL: NEGATIVE
PL12 AB SER QL: NEGATIVE
PL7 AB SER QL: NEGATIVE
PM/SCL-100 AB SER QL LINE BLOT: NEGATIVE
SAE1 AB SER QL LINE BLOT: NEGATIVE
SRP AB SERPL QL: NEGATIVE
TIF1-GAMMA AB SER QL LINE BLOT: NEGATIVE
U1 SNRNP IGG SER-ACNC: 6 UNITS (ref 0–19)

## 2024-12-27 RX ORDER — HYDROXYZINE HYDROCHLORIDE 25 MG/1
25 TABLET, FILM COATED ORAL EVERY 4 HOURS PRN
Qty: 30 TABLET | Refills: 1 | Status: SHIPPED | OUTPATIENT
Start: 2024-12-27

## 2024-12-27 RX ORDER — INSULIN DEGLUDEC 100 U/ML
INJECTION, SOLUTION SUBCUTANEOUS
Qty: 15 ML | Refills: 1 | Status: SHIPPED | OUTPATIENT
Start: 2024-12-27

## 2025-01-02 ENCOUNTER — PATIENT OUTREACH (OUTPATIENT)
Dept: PRIMARY CARE | Facility: CLINIC | Age: 45
End: 2025-01-02
Payer: COMMERCIAL

## 2025-01-02 NOTE — PROGRESS NOTES
Call regarding appt. with PCP on 12/26/2024  after hospitalization. Patient states she is doing okay , appeared to have a bad connection ended up loosing connection call ended .

## 2025-01-03 PROCEDURE — RXMED WILLOW AMBULATORY MEDICATION CHARGE

## 2025-01-09 ENCOUNTER — APPOINTMENT (OUTPATIENT)
Dept: PRIMARY CARE | Facility: CLINIC | Age: 45
End: 2025-01-09
Payer: COMMERCIAL

## 2025-01-09 ENCOUNTER — PHARMACY VISIT (OUTPATIENT)
Dept: PHARMACY | Facility: CLINIC | Age: 45
End: 2025-01-09
Payer: MEDICAID

## 2025-01-09 ENCOUNTER — TELEPHONE (OUTPATIENT)
Dept: PRIMARY CARE | Facility: CLINIC | Age: 45
End: 2025-01-09

## 2025-01-09 ENCOUNTER — PATIENT MESSAGE (OUTPATIENT)
Dept: PRIMARY CARE | Facility: CLINIC | Age: 45
End: 2025-01-09

## 2025-01-09 DIAGNOSIS — E11.42 TYPE 2 DIABETES MELLITUS WITH DIABETIC POLYNEUROPATHY, WITH LONG-TERM CURRENT USE OF INSULIN: ICD-10-CM

## 2025-01-09 DIAGNOSIS — B37.0 THRUSH: Primary | ICD-10-CM

## 2025-01-09 DIAGNOSIS — Z79.4 TYPE 2 DIABETES MELLITUS WITH DIABETIC POLYNEUROPATHY, WITH LONG-TERM CURRENT USE OF INSULIN: ICD-10-CM

## 2025-01-09 RX ORDER — METFORMIN HYDROCHLORIDE 500 MG/1
1500 TABLET, EXTENDED RELEASE ORAL DAILY
Qty: 270 TABLET | Refills: 1 | Status: SHIPPED | OUTPATIENT
Start: 2025-01-09

## 2025-01-09 RX ORDER — NYSTATIN 100000 [USP'U]/ML
500000 SUSPENSION ORAL 4 TIMES DAILY
Qty: 280 ML | Refills: 0 | Status: SHIPPED | OUTPATIENT
Start: 2025-01-09 | End: 2025-01-23

## 2025-01-09 NOTE — PATIENT INSTRUCTIONS
Increase Tresiba to 50 units daily   Continue Insulin Lispro at 8 units before each meal   Follow up in 2-3 weeks

## 2025-01-09 NOTE — PROGRESS NOTES
DM FOLLOW UP  E11.9    Alanna Hickman is here today at the request of Referring Provider: Ansley Shetty PA-C for my opinion regarding diabetes management.  My final recommendations will be communicated back to the requesting provider by way of shared medical record.     Subjective     Past Medical History:  She has a past medical history of Arthritis, Bilateral hand pain, Eczema, Neuromuscular disorder (Multi), Stroke (Multi), and Type 2 diabetes mellitus.    Social History:  She reports that she has quit smoking. Her smoking use included cigarettes. She started smoking about 25 years ago. She has a 25.6 pack-year smoking history. She has been exposed to tobacco smoke. She has never used smokeless tobacco. She reports that she does not currently use alcohol. She reports that she does not use drugs.    Allergies:  Dulaglutide    CURRENT DM PHARMACOTHERAPY   Farxiga 10mg daily   Tresiba 40 units daily   Humalog 8 units before meals   Metformin XR 500mg daily     Pt denies SE/intolerances  Reviewed all medications by prescribing practitioner (such as prescriptions, OTCs, herbal therapies and supplements) and documented in the medical record.         Primary/Secondary Prevention   - Statin? No  - ACE-I/ARB? No  - Aspirin? No    Glucose Monitoring  Patient is using CGM, Jignesh.  Report reviewed with patient.  See attached documents.    Name: Alanna Hickman  YOB: 1980  Report Period: 10/12/2024 - 01/09/2025 (90 days)  Generated: 01/11/2025  Time CGM Active: 54%  Glucose Statistics and Targets  Average Glucose: 237 mg/dL  Glucose Management Indicator (GMI): 9.0%  Glucose Variability (%CV): 39.6%  Target Range: 70 - 180 mg/dL  Time in Ranges  Very High: >250 mg/dL --- 38%  High: 181 - 250 mg/dL --- 28%  Target Range: 70 - 180 mg/dL --- 34%  Low: 54 - 69 mg/dL --- 0%  Very Low: <54 mg/dL --- 0%      Lifestyle:  Current diet: poorly controlled, pt knows reasonable CHO limits  Current exercise: no  regular exercise    Last Labs/Vitals/Meds  POC HEMOGLOBIN A1c (%)   Date Value   09/05/2024 7.2 (A)   05/23/2024 8.8 (A)   02/15/2024 8.7 (A)     Hemoglobin A1C (%)   Date Value   12/13/2024 7.8 (H)     Glucose (mg/dL)   Date Value   12/20/2024 214 (H)     Creatinine (mg/dL)   Date Value   12/20/2024 0.45 (L)     eGFR (mL/min/1.73m*2)   Date Value   12/20/2024 >90       BP Readings from Last 6 Encounters:   12/26/24 128/78   12/20/24 (!) 116/48   12/17/24 144/82   12/06/24 120/70   11/04/24 112/84   09/05/24 124/74        Wt Readings from Last 6 Encounters:   12/26/24 103 kg (226 lb)   12/18/24 107 kg (235 lb)   12/08/24 107 kg (235 lb)   12/06/24 107 kg (235 lb)   11/13/24 106 kg (234 lb)   11/04/24 106 kg (234 lb)       Current Outpatient Medications on File Prior to Visit   Medication Sig Dispense Refill    albuterol (Ventolin HFA) 90 mcg/actuation inhaler Inhale 2 puffs every 6 hours if needed for wheezing. 36 g 2    blood sugar diagnostic (OneTouch Ultra Test) strip USE TO TEST BLOOD SUGARS TWICE DAILY AS DIRECTED 200 strip 3    blood-glucose meter misc Test glucose twice daily as directed 1 each 0    blood-glucose sensor (FreeStyle Jignesh 3 Sensor) device Change sensor every 14 days as directed 2 each 11    clopidogrel (Plavix) 75 mg tablet Take 1 tablet (75 mg) by mouth once daily.      cyclobenzaprine (Flexeril) 10 mg tablet Take 0.5-1 tablets (5-10 mg) by mouth as needed at bedtime for muscle spasms.      Farxiga 10 mg TAKE ONE TABLET BY MOUTH ONCE DAILY 90 tablet 1    FreeStyle Jignesh 3 [place] misc Use as instructed to read glucose 1 each 0    hydrocodone-homatropine (Hycodan) 5-1.5 mg/5 mL syrup Take 5 mL by mouth every 6 hours if needed for cough. 120 mL 0    hydrOXYzine HCL (Atarax) 25 mg tablet Take 1 tablet (25 mg) by mouth every 4 hours if needed for anxiety. 30 tablet 1    inhalational spacing device inhaler Use as directed with inhalers 1 each 0    insulin degludec (Tresiba FlexTouch U-100) 100  "unit/mL (3 mL) injection INJECT 40 UNITS UNDER THE SKIN ONCE DAILY AT BEDTIME AS DIRECTED PER INSULIN INSTRUCTIONS 15 mL 1    insulin lispro (HumaLOG) 100 unit/mL injection Inject 8 Units under the skin 3 times daily (morning, midday, late afternoon). Take as directed per insulin instructions 15 mL 1    ipratropium-albuteroL (Duo-Neb) 0.5-2.5 mg/3 mL nebulizer solution Inhale the contents of 1 vial (3 ml) by nebulization 4 times a day as needed for wheezing or shortness of breath. 360 mL 2    lancets misc Test sugars twice daily 50 each 2    metFORMIN  mg 24 hr tablet TAKE 3 TABLETS BY MOUTH EVERY  tablet 1    nebulizer and compressor (Comp-Air Nebulizer Compressor) device Use with nebulizer solution as directed 1 each 0    nicotine (Nicoderm CQ) 14 mg/24 hr patch Place 1 patch over 24 hours on the skin once every 24 hours. 14 patch 0    nicotine (Nicoderm CQ) 21 mg/24 hr patch Place 1 patch over 24 hours on the skin once every 24 hours for 21 days. 21 patch 0    nicotine (Nicoderm CQ) 21 mg/24 hr patch Place 1 patch over 24 hours on the skin once every 24 hours. 21 patch 0    nicotine (Nicoderm CQ) 7 mg/24 hr patch Place 1 patch over 24 hours on the skin once every 24 hours for 14 days. 14 patch 0    pantoprazole (ProtoNix) 20 mg EC tablet Take 1 tablet (20 mg) by mouth once daily in the morning. Take before meals. Do not crush, chew, or split. 30 tablet 3    predniSONE (Deltasone) 10 mg tablet Take 4 tablets (40 mg) by mouth once daily for 14 days, THEN 3 tablets (30 mg) once daily for 14 days, THEN 2 tablets (20 mg) once daily for 14 days, THEN 1 tablet (10 mg) once daily for 14 days. 140 tablet 0    Sure Comfort Pen Needle 31 gauge x 3/16\" needle use once daily as directed 100 each 3     No current facility-administered medications on file prior to visit.         Assessment:  Patients diabetes is improved with most recent A1c of 7.2% (Goal < 7%).  Was 8.8% 6 months ago   Compliance at present is " estimated to be good  Suggest increae basal insulin dose due to average glucose = 237      Plan:   Increase Tresiba to 50 units daily   Continue Insulin Lispro at 8 units before each meal   Follow up in 2-3 weeks   Continue all other meds under the continuation of care with the referring provider and clinical pharmacy team.    Data reviewed and evaluated by GENEVA Champagne RPH, [facility]  Treatment and plan changes discussed with Ansley Shetty PA-C

## 2025-01-09 NOTE — TELEPHONE ENCOUNTER
Patient just had an appointment with Nicole, and she was directed to make an appointment with you because she thinks she has thrush - has had it since 12/18/2024 after taking antibiotics, and it is getting worse. She said she would call tomorrow to make a same-day appointment, but your last appointment available was taken for tomorrow. Please advise.

## 2025-01-14 ENCOUNTER — PATIENT OUTREACH (OUTPATIENT)
Dept: PRIMARY CARE | Facility: CLINIC | Age: 45
End: 2025-01-14
Payer: COMMERCIAL

## 2025-01-14 DIAGNOSIS — Z72.0 TOBACCO ABUSE: ICD-10-CM

## 2025-01-14 NOTE — PROGRESS NOTES
Unable to reach patient for a F/U call     LVM with call back number for patient to call if needed   If no voicemail available call attempts x 2 were made to contact the patient to assist with any questions or concerns patient may have.     L/M encouraged patient to call providers for any questions concerns or change in condition.

## 2025-01-15 RX ORDER — IBUPROFEN 200 MG
TABLET ORAL
Qty: 21 PATCH | Refills: 0 | Status: SHIPPED | OUTPATIENT
Start: 2025-01-15

## 2025-01-16 PROCEDURE — RXMED WILLOW AMBULATORY MEDICATION CHARGE

## 2025-01-17 ENCOUNTER — APPOINTMENT (OUTPATIENT)
Dept: RADIOLOGY | Facility: HOSPITAL | Age: 45
End: 2025-01-17
Payer: COMMERCIAL

## 2025-01-20 ENCOUNTER — TELEPHONE (OUTPATIENT)
Dept: INTERNAL MEDICINE | Facility: HOSPITAL | Age: 45
End: 2025-01-20

## 2025-01-20 DIAGNOSIS — E11.9 TYPE 2 DIABETES MELLITUS WITHOUT COMPLICATION, WITHOUT LONG-TERM CURRENT USE OF INSULIN (MULTI): ICD-10-CM

## 2025-01-20 RX ORDER — BLOOD-GLUCOSE SENSOR
EACH MISCELLANEOUS
Qty: 4 EACH | Refills: 2 | Status: SHIPPED | OUTPATIENT
Start: 2025-01-20

## 2025-01-21 ENCOUNTER — PHARMACY VISIT (OUTPATIENT)
Dept: PHARMACY | Facility: CLINIC | Age: 45
End: 2025-01-21
Payer: MEDICAID

## 2025-01-22 ENCOUNTER — APPOINTMENT (OUTPATIENT)
Dept: RESPIRATORY THERAPY | Facility: HOSPITAL | Age: 45
End: 2025-01-22
Payer: COMMERCIAL

## 2025-01-22 ENCOUNTER — APPOINTMENT (OUTPATIENT)
Dept: RADIOLOGY | Facility: HOSPITAL | Age: 45
End: 2025-01-22
Payer: COMMERCIAL

## 2025-01-22 PROCEDURE — RXMED WILLOW AMBULATORY MEDICATION CHARGE

## 2025-01-23 ENCOUNTER — APPOINTMENT (OUTPATIENT)
Dept: PRIMARY CARE | Facility: CLINIC | Age: 45
End: 2025-01-23
Payer: COMMERCIAL

## 2025-01-24 ENCOUNTER — PHARMACY VISIT (OUTPATIENT)
Dept: PHARMACY | Facility: CLINIC | Age: 45
End: 2025-01-24
Payer: MEDICAID

## 2025-02-01 ENCOUNTER — APPOINTMENT (OUTPATIENT)
Dept: RADIOLOGY | Facility: HOSPITAL | Age: 45
End: 2025-02-01
Payer: COMMERCIAL

## 2025-02-01 ENCOUNTER — HOSPITAL ENCOUNTER (EMERGENCY)
Facility: HOSPITAL | Age: 45
Discharge: HOME | End: 2025-02-01
Payer: COMMERCIAL

## 2025-02-01 VITALS
DIASTOLIC BLOOD PRESSURE: 82 MMHG | RESPIRATION RATE: 18 BRPM | OXYGEN SATURATION: 98 % | WEIGHT: 230 LBS | BODY MASS INDEX: 36.1 KG/M2 | HEIGHT: 67 IN | TEMPERATURE: 97.5 F | SYSTOLIC BLOOD PRESSURE: 116 MMHG | HEART RATE: 100 BPM

## 2025-02-01 DIAGNOSIS — R56.9 SEIZURE-LIKE ACTIVITY (MULTI): Primary | ICD-10-CM

## 2025-02-01 LAB
ALBUMIN SERPL BCP-MCNC: 4.4 G/DL (ref 3.4–5)
ALP SERPL-CCNC: 85 U/L (ref 33–110)
ALT SERPL W P-5'-P-CCNC: 18 U/L (ref 7–45)
AMPHETAMINES UR QL SCN: NORMAL
ANION GAP SERPL CALCULATED.3IONS-SCNC: 16 MMOL/L (ref 10–20)
APPEARANCE UR: CLEAR
AST SERPL W P-5'-P-CCNC: 17 U/L (ref 9–39)
BARBITURATES UR QL SCN: NORMAL
BASOPHILS # BLD AUTO: 0.04 X10*3/UL (ref 0–0.1)
BASOPHILS NFR BLD AUTO: 0.4 %
BENZODIAZ UR QL SCN: NORMAL
BILIRUB SERPL-MCNC: 0.6 MG/DL (ref 0–1.2)
BILIRUB UR STRIP.AUTO-MCNC: NEGATIVE MG/DL
BUN SERPL-MCNC: 10 MG/DL (ref 6–23)
BZE UR QL SCN: NORMAL
CALCIUM SERPL-MCNC: 9.4 MG/DL (ref 8.6–10.3)
CANNABINOIDS UR QL SCN: NORMAL
CHLORIDE SERPL-SCNC: 92 MMOL/L (ref 98–107)
CO2 SERPL-SCNC: 28 MMOL/L (ref 21–32)
COLOR UR: YELLOW
CREAT SERPL-MCNC: 0.54 MG/DL (ref 0.5–1.05)
EGFRCR SERPLBLD CKD-EPI 2021: >90 ML/MIN/1.73M*2
EOSINOPHIL # BLD AUTO: 0.09 X10*3/UL (ref 0–0.7)
EOSINOPHIL NFR BLD AUTO: 0.9 %
ERYTHROCYTE [DISTWIDTH] IN BLOOD BY AUTOMATED COUNT: 13.2 % (ref 11.5–14.5)
ETHANOL SERPL-MCNC: <10 MG/DL
FENTANYL+NORFENTANYL UR QL SCN: NORMAL
GLUCOSE SERPL-MCNC: 159 MG/DL (ref 74–99)
GLUCOSE UR STRIP.AUTO-MCNC: ABNORMAL MG/DL
HCT VFR BLD AUTO: 50.7 % (ref 36–46)
HGB BLD-MCNC: 16.9 G/DL (ref 12–16)
IMM GRANULOCYTES # BLD AUTO: 0.05 X10*3/UL (ref 0–0.7)
IMM GRANULOCYTES NFR BLD AUTO: 0.5 % (ref 0–0.9)
KETONES UR STRIP.AUTO-MCNC: ABNORMAL MG/DL
LEUKOCYTE ESTERASE UR QL STRIP.AUTO: NEGATIVE
LYMPHOCYTES # BLD AUTO: 1.13 X10*3/UL (ref 1.2–4.8)
LYMPHOCYTES NFR BLD AUTO: 11.3 %
MAGNESIUM SERPL-MCNC: 1.6 MG/DL (ref 1.6–2.4)
MCH RBC QN AUTO: 31.9 PG (ref 26–34)
MCHC RBC AUTO-ENTMCNC: 33.3 G/DL (ref 32–36)
MCV RBC AUTO: 96 FL (ref 80–100)
METHADONE UR QL SCN: NORMAL
MONOCYTES # BLD AUTO: 0.6 X10*3/UL (ref 0.1–1)
MONOCYTES NFR BLD AUTO: 6 %
MUCOUS THREADS #/AREA URNS AUTO: NORMAL /LPF
NEUTROPHILS # BLD AUTO: 8.07 X10*3/UL (ref 1.2–7.7)
NEUTROPHILS NFR BLD AUTO: 80.9 %
NITRITE UR QL STRIP.AUTO: NEGATIVE
NRBC BLD-RTO: 0 /100 WBCS (ref 0–0)
OPIATES UR QL SCN: NORMAL
OXYCODONE+OXYMORPHONE UR QL SCN: NORMAL
PCP UR QL SCN: NORMAL
PH UR STRIP.AUTO: 5.5 [PH]
PLATELET # BLD AUTO: 230 X10*3/UL (ref 150–450)
POTASSIUM SERPL-SCNC: 4.2 MMOL/L (ref 3.5–5.3)
PROT SERPL-MCNC: 7.6 G/DL (ref 6.4–8.2)
PROT UR STRIP.AUTO-MCNC: ABNORMAL MG/DL
RBC # BLD AUTO: 5.3 X10*6/UL (ref 4–5.2)
RBC # UR STRIP.AUTO: ABNORMAL /UL
RBC #/AREA URNS AUTO: NORMAL /HPF
SODIUM SERPL-SCNC: 132 MMOL/L (ref 136–145)
SP GR UR STRIP.AUTO: 1.02
SQUAMOUS #/AREA URNS AUTO: NORMAL /HPF
UROBILINOGEN UR STRIP.AUTO-MCNC: ABNORMAL MG/DL
WBC # BLD AUTO: 10 X10*3/UL (ref 4.4–11.3)
WBC #/AREA URNS AUTO: NORMAL /HPF

## 2025-02-01 PROCEDURE — 96360 HYDRATION IV INFUSION INIT: CPT

## 2025-02-01 PROCEDURE — 36415 COLL VENOUS BLD VENIPUNCTURE: CPT | Performed by: NURSE PRACTITIONER

## 2025-02-01 PROCEDURE — 80053 COMPREHEN METABOLIC PANEL: CPT | Performed by: NURSE PRACTITIONER

## 2025-02-01 PROCEDURE — 99284 EMERGENCY DEPT VISIT MOD MDM: CPT | Mod: 25

## 2025-02-01 PROCEDURE — 82077 ASSAY SPEC XCP UR&BREATH IA: CPT | Performed by: NURSE PRACTITIONER

## 2025-02-01 PROCEDURE — 84146 ASSAY OF PROLACTIN: CPT | Mod: WESLAB | Performed by: NURSE PRACTITIONER

## 2025-02-01 PROCEDURE — 70450 CT HEAD/BRAIN W/O DYE: CPT | Performed by: RADIOLOGY

## 2025-02-01 PROCEDURE — 2500000004 HC RX 250 GENERAL PHARMACY W/ HCPCS (ALT 636 FOR OP/ED): Performed by: NURSE PRACTITIONER

## 2025-02-01 PROCEDURE — 85025 COMPLETE CBC W/AUTO DIFF WBC: CPT | Performed by: NURSE PRACTITIONER

## 2025-02-01 PROCEDURE — 83735 ASSAY OF MAGNESIUM: CPT | Performed by: NURSE PRACTITIONER

## 2025-02-01 PROCEDURE — 70450 CT HEAD/BRAIN W/O DYE: CPT

## 2025-02-01 PROCEDURE — 81001 URINALYSIS AUTO W/SCOPE: CPT | Mod: 59 | Performed by: NURSE PRACTITIONER

## 2025-02-01 PROCEDURE — 80307 DRUG TEST PRSMV CHEM ANLYZR: CPT | Performed by: NURSE PRACTITIONER

## 2025-02-01 RX ADMIN — SODIUM CHLORIDE 1000 ML: 900 INJECTION, SOLUTION INTRAVENOUS at 19:33

## 2025-02-01 ASSESSMENT — COLUMBIA-SUICIDE SEVERITY RATING SCALE - C-SSRS
2. HAVE YOU ACTUALLY HAD ANY THOUGHTS OF KILLING YOURSELF?: NO
1. IN THE PAST MONTH, HAVE YOU WISHED YOU WERE DEAD OR WISHED YOU COULD GO TO SLEEP AND NOT WAKE UP?: NO
6. HAVE YOU EVER DONE ANYTHING, STARTED TO DO ANYTHING, OR PREPARED TO DO ANYTHING TO END YOUR LIFE?: NO

## 2025-02-01 ASSESSMENT — PAIN - FUNCTIONAL ASSESSMENT: PAIN_FUNCTIONAL_ASSESSMENT: 0-10

## 2025-02-01 ASSESSMENT — PAIN SCALES - GENERAL: PAINLEVEL_OUTOF10: 0 - NO PAIN

## 2025-02-01 NOTE — ED PROVIDER NOTES
HPI   Chief Complaint   Patient presents with    Seizures       HPI  See my MDM      Patient History   Past Medical History:   Diagnosis Date    Arthritis     Bilateral hand pain     Eczema     Neuromuscular disorder (Multi)     Stroke (Multi)     Type 2 diabetes mellitus      Past Surgical History:   Procedure Laterality Date    MR HEAD ANGIO WO IV CONTRAST  6/16/2022    MR HEAD ANGIO WO IV CONTRAST [facility]     Family History   Problem Relation Name Age of Onset    Arthritis Mother sarah     Hypertension Mother sarah     Other (other conditons influening health) Mother sarah     Stroke Mother sarah     Heart failure Father      Heart attack Father      Diabetes type II Father      Other (severe back pain) Sister      Other (?OD) Sister          uncertain    Coronary artery disease Brother      Heart attack Brother      Other (back pain) Brother      Diabetes type II Brother      Other (bladder cancer) Brother      Other (Hunchback arthritis) Paternal Grandmother      Breast cancer Mother's Sister yanick     Asthma Daughter sanjana      Social History     Tobacco Use    Smoking status: Former     Current packs/day: 1.00     Average packs/day: 1 pack/day for 25.7 years (25.7 ttl pk-yrs)     Types: Cigarettes     Start date: 1/1/2000     Passive exposure: Current    Smokeless tobacco: Never   Vaping Use    Vaping status: Never Used   Substance Use Topics    Alcohol use: Not Currently    Drug use: Never       Physical Exam   ED Triage Vitals [02/01/25 1724]   Temperature Heart Rate Respirations BP   36.4 °C (97.5 °F) (!) 104 18 (!) 154/108      Pulse Ox Temp src Heart Rate Source Patient Position   98 % -- Monitor Sitting      BP Location FiO2 (%)     Left arm --       Physical Exam  CONSTITUTIONAL: Vital signs reviewed as charted, well-developed and in no distress  Eyes: Extraocular muscles are intact. Pupils equal round and reactive to light. Conjunctiva are pink.    ENT: Mucous membranes are  Mom contacted office with concerns of BG trending high, reviewed Dexcom data, patient has been spiking post breakfast, lunch, and with bedtime snack. Recommended the following IC adjustments and advised mom to reach out in a few days if BG still trending high.    IC:  Breakfast: 11 (was 12)  Lunch: 16 (was 18)  Dinner: 16 (no change)  Bedtime snack: 16 (was 18)   moist. Tongue in the midline. Pharynx was without erythema or exudates, uvula midline  LUNGS: Breath sounds equal and clear to auscultation. Good air exchange, no wheezes rales or retractions, pulse oximetry is charted.  HEART: Regular rate and rhythm without murmur thrill or rub, strong tones, auscultation is normal.  ABDOMEN: Soft and nontender without guarding rebound rigidity or mass. Bowel sounds are present and normal in all quadrants. There is no palpable masses or aneurysms identified. No hepatosplenomegaly, normal abdominal exam.  Neuro: The patient is awake, alert and oriented ×3 but a little slow to answer . Moving all 4 extremities and answering questions appropriately.   MUSCULOSKELETAL: The calves are nontender to palpation. Full gross active range of motion.   PSYCH: Awake alert oriented, normal mood and affect.  Skin:  Dry, normal color, warm to the touch, no rash present.        ED Course & MDM   Diagnoses as of 02/01/25 1928   Seizure-like activity (Multi)                 No data recorded     Brian Coma Scale Score: 15 (02/01/25 1727 : Kemal Key RN)                           Medical Decision Making  History obtained from: patient    Vital signs, nursing notes, current medications, past medical history, Surgical history, allergies, social history, family History were reviewed.         HPI:  Patient is a 44-year-old female present emergency room today after reportedly having seizure-like activity at home.  Per EMS they are stating she was in postictal upon their arrival.  On arrival here to the ER she is alert and oriented x 3 she is answering questions appropriately but a little slow.  Does not remember what happened.  Denies dizziness, chest pain, shortness of breath, abdominal pain or extremity edema.  Denies nausea vomiting diarrhea.  Denies injury.  Vital signs positive hypertension and tachycardia.      10 point ROS was reviewed and negative except Noted above in HPI.  DDX: as listed  above          MDM Summary/considerations:  Labs Reviewed   CBC WITH AUTO DIFFERENTIAL - Abnormal       Result Value    WBC 10.0      nRBC 0.0      RBC 5.30 (*)     Hemoglobin 16.9 (*)     Hematocrit 50.7 (*)     MCV 96      MCH 31.9      MCHC 33.3      RDW 13.2      Platelets 230      Neutrophils % 80.9      Immature Granulocytes %, Automated 0.5      Lymphocytes % 11.3      Monocytes % 6.0      Eosinophils % 0.9      Basophils % 0.4      Neutrophils Absolute 8.07 (*)     Immature Granulocytes Absolute, Automated 0.05      Lymphocytes Absolute 1.13 (*)     Monocytes Absolute 0.60      Eosinophils Absolute 0.09      Basophils Absolute 0.04     COMPREHENSIVE METABOLIC PANEL - Abnormal    Glucose 159 (*)     Sodium 132 (*)     Potassium 4.2      Chloride 92 (*)     Bicarbonate 28      Anion Gap 16      Urea Nitrogen 10      Creatinine 0.54      eGFR >90      Calcium 9.4      Albumin 4.4      Alkaline Phosphatase 85      Total Protein 7.6      AST 17      Bilirubin, Total 0.6      ALT 18     URINALYSIS WITH REFLEX CULTURE AND MICROSCOPIC - Abnormal    Color, Urine Yellow      Appearance, Urine Clear      Specific Gravity, Urine 1.025      pH, Urine 5.5      Protein, Urine 100 (2+) (*)     Glucose, Urine OVER (4+) (*)     Blood, Urine 0.03 (TRACE) (*)     Ketones, Urine 80 (3+) (*)     Bilirubin, Urine NEGATIVE      Urobilinogen, Urine 2 (1+) (*)     Nitrite, Urine NEGATIVE      Leukocyte Esterase, Urine NEGATIVE      Narrative:     OVER is reported when the result is greater than the clinically reportable range.   DRUG SCREEN,URINE - Normal    Amphetamine Screen, Urine Presumptive Negative      Barbiturate Screen, Urine Presumptive Negative      Benzodiazepines Screen, Urine Presumptive Negative      Cannabinoid Screen, Urine Presumptive Negative      Cocaine Metabolite Screen, Urine Presumptive Negative      Fentanyl Screen, Urine Presumptive Negative      Opiate Screen, Urine Presumptive Negative      Oxycodone  Screen, Urine Presumptive Negative      PCP Screen, Urine Presumptive Negative      Methadone Screen, Urine Presumptive Negative      Narrative:     Drug screen results are presumptive and should not be used to assess   compliance with prescribed medication. Contact the performing [facility] laboratory   to add-on definitive confirmatory testing if clinically indicated.    Toxicology screening results are reported qualitatively. The concentration must   be greater than or equal to the cutoff to be reported as positive. The concentration   at which the screening test can detect an individual drug or metabolite varies.   The absence of expected drug(s) and/or drug metabolite(s) may indicate non-compliance,   inappropriate timing of specimen collection relative to drug administration, poor drug   absorption, diluted/adulterated urine, or limitations of testing. For medical purposes   only; not valid for forensic use.    Interpretive questions should be directed to the laboratory medical directors.   ALCOHOL - Normal    Alcohol <10     MAGNESIUM - Normal    Magnesium 1.60     URINALYSIS WITH REFLEX CULTURE AND MICROSCOPIC    Narrative:     The following orders were created for panel order Urinalysis with Reflex Culture and Microscopic.  Procedure                               Abnormality         Status                     ---------                               -----------         ------                     Urinalysis with Reflex C...[[id number]]  Abnormal            Final result               Extra Urine Gray Tube[[id number]]                            In process                   Please view results for these tests on the individual orders.   PROLACTIN   EXTRA URINE GRAY TUBE   URINALYSIS MICROSCOPIC WITH REFLEX CULTURE    WBC, Urine 1-5      RBC, Urine 1-2      Squamous Epithelial Cells, Urine 10-25 (FEW)      Mucus, Urine FEW       CT head wo IV contrast   Final Result   No acute intracranial pathology.   Remote left MCA  infarct.   Fluid in a few mastoid air cells bilaterally consistent with acute or   chronic mastoiditis.        MACRO:   None        Signed by: Maddiearon Packer 2/1/2025 5:55 PM   Dictation workstation:   [id number]        Medications   sodium chloride 0.9 % bolus 1,000 mL (has no administration in time range)     New Prescriptions    No medications on file       I estimate there is a low risk for subarachnoid hemorrhage, cavernous sinus venous thrombosis, meningitis, intracranial hemorrhage, subdural hematoma, or stroke, thus I considered the discharge disposition reasonable. We have discussed the diagnosis and risks, and we agreed with discharging home to follow-up with her primary care doctor. We also discussed returning to the emergency department immediately if new or worsening symptoms occur. We have discussed the symptoms which are most concerning such as changing or worsening pain, weakness, vomiting, or fever and necessitate immediate return.      CT scan shows an old infarct patient is aware of.  Otherwise workup in ED is grossly unremarkable.  Patient was discharged home stable condition will follow PCP 1 to 2 days for reevaluation.  Patient's blood sticks show some dehydration with hemoconcentration hemoglobin 16.9 hematocrit 50.7.  She does have ketones present in urine as well.  Was discharged home stable condition.    All of the patient's questions were answered to the best of my ability.  Patient states understanding that they have been screened for an emergency today and we have not found any etiology of symptoms that requires emergent treatment or admission to the hospital at this point. They understand that they have not had definitive care day and require follow-up for treatment of their condition. They also state understanding that they may have an emergent condition that may potentially have not of detected at this visit and they must return to the emergency department if they develop any  worsening of symptoms or new complaints.      Discussed H&P with supervising physician, aware of results and agrees with plan/ disposition.          Critical Care: Not warranted at this time        This chart was completed using voice recognition transcription software. Please excuse any errors of transcription including grammatical, punctuation, syntax and spelling errors.  Please contact me with any questions regarding this chart.    Procedure  Procedures     CHICHI Nielsen-CNP  02/01/25 1926

## 2025-02-01 NOTE — ED TRIAGE NOTES
Per EMS they responded for a C/C of Sz. Pt a 45 Y/O F who had a Witnessed Sz in this afternoon. Pt arrived to ED A&O x 3. Denies any injury, have no C/C

## 2025-02-02 ENCOUNTER — APPOINTMENT (OUTPATIENT)
Dept: RADIOLOGY | Facility: HOSPITAL | Age: 45
End: 2025-02-02
Payer: COMMERCIAL

## 2025-02-02 LAB
HOLD SPECIMEN: NORMAL
PROLACTIN SERPL-MCNC: 18.4 UG/L (ref 3–20)

## 2025-02-03 ENCOUNTER — APPOINTMENT (OUTPATIENT)
Dept: RESPIRATORY THERAPY | Facility: HOSPITAL | Age: 45
End: 2025-02-03
Payer: COMMERCIAL

## 2025-02-03 ASSESSMENT — ENCOUNTER SYMPTOMS
MEMORY LOSS: 1
WEAKNESS: 1
AURA: 1
ALTERED MENTAL STATUS: 1
HEADACHES: 1
NEUROLOGIC COMPLAINT: 1

## 2025-02-04 ENCOUNTER — OFFICE VISIT (OUTPATIENT)
Dept: PRIMARY CARE | Facility: CLINIC | Age: 45
End: 2025-02-04
Payer: COMMERCIAL

## 2025-02-04 VITALS
TEMPERATURE: 98 F | OXYGEN SATURATION: 96 % | SYSTOLIC BLOOD PRESSURE: 128 MMHG | BODY MASS INDEX: 34.93 KG/M2 | DIASTOLIC BLOOD PRESSURE: 76 MMHG | HEART RATE: 98 BPM | WEIGHT: 223 LBS

## 2025-02-04 DIAGNOSIS — J15.7 PNEUMONIA OF BOTH UPPER LOBES DUE TO MYCOPLASMA PNEUMONIAE: Primary | ICD-10-CM

## 2025-02-04 DIAGNOSIS — K21.9 GASTROESOPHAGEAL REFLUX DISEASE WITHOUT ESOPHAGITIS: ICD-10-CM

## 2025-02-04 PROCEDURE — 3078F DIAST BP <80 MM HG: CPT | Performed by: PHYSICIAN ASSISTANT

## 2025-02-04 PROCEDURE — 1036F TOBACCO NON-USER: CPT | Performed by: PHYSICIAN ASSISTANT

## 2025-02-04 PROCEDURE — 3074F SYST BP LT 130 MM HG: CPT | Performed by: PHYSICIAN ASSISTANT

## 2025-02-04 PROCEDURE — 99214 OFFICE O/P EST MOD 30 MIN: CPT | Performed by: PHYSICIAN ASSISTANT

## 2025-02-04 RX ORDER — LEVETIRACETAM 500 MG/1
TABLET ORAL
COMMUNITY
Start: 2025-02-03

## 2025-02-04 RX ORDER — PANTOPRAZOLE SODIUM 20 MG/1
20 TABLET, DELAYED RELEASE ORAL
Qty: 90 TABLET | Refills: 1 | Status: SHIPPED | OUTPATIENT
Start: 2025-02-04

## 2025-02-04 RX ORDER — LAMOTRIGINE 25 MG/1
TABLET ORAL
COMMUNITY
Start: 2025-02-03

## 2025-02-04 RX ORDER — QUETIAPINE FUMARATE 50 MG/1
TABLET, FILM COATED ORAL
COMMUNITY

## 2025-02-04 RX ORDER — SERTRALINE HYDROCHLORIDE 50 MG/1
TABLET, FILM COATED ORAL
COMMUNITY
Start: 2025-01-08

## 2025-02-04 ASSESSMENT — PAIN SCALES - GENERAL: PAINLEVEL_OUTOF10: 7

## 2025-02-04 ASSESSMENT — ENCOUNTER SYMPTOMS
ALTERED MENTAL STATUS: 1
NEUROLOGIC COMPLAINT: 1
WEAKNESS: 1
AURA: 1
MEMORY LOSS: 1
HEADACHES: 1

## 2025-02-04 NOTE — PROGRESS NOTES
Subjective   Patient ID: Alanna Hickman is a 44 y.o. female who presents for ER Follow-up.    Acute Neurological Problem  The patient's primary symptoms include an altered mental status, memory loss, syncope and weakness. This is a new problem. The current episode started in the past 7 days. The neurological problem developed suddenly. The problem has been resolved since onset. There was no focality noted. Associated symptoms include an aura and headaches. Past treatments include nothing.   Patient went to the ER on February 1 after having an apparent seizure at home.  EMS was called.  She appeared to be slightly postictal when EMS arrived.  Labs did show her sodium was a little low  Not eating much. Did vomit a few times yesterday before eating a big mac. She vomited a few times, then didn't feel well.. she felt dread and then everything went black.  Workup was essentially benign otherwise.  Her CT did show that her previous stroke.  She saw neuro yesterday was started on Keppra and she is not sure the of the medication she has picked up from the pharmacy.  Now CPS have been called once the squad came to get her and she is currently in discussion with them.  She admits her house has not been the cleanest she has had a lot of issues so as her other family members who have had health issues and have not been able to keep up as well.  Review of Systems   Neurological:  Positive for syncope, weakness and headaches.   Psychiatric/Behavioral:  Positive for memory loss.        Objective   /76   Pulse 98   Temp 36.7 °C (98 °F)   Wt 101 kg (223 lb)   SpO2 96%   BMI 34.93 kg/m²     Physical Exam  Constitutional:       Comments: Looks tired   Eyes:      Extraocular Movements: Extraocular movements intact.      Pupils: Pupils are equal, round, and reactive to light.   Cardiovascular:      Rate and Rhythm: Normal rate and regular rhythm.      Pulses: Normal pulses.   Neurological:      General: No focal deficit  present.      Mental Status: She is alert and oriented to person, place, and time. Mental status is at baseline.      Cranial Nerves: No cranial nerve deficit.      Sensory: No sensory deficit.   Psychiatric:         Mood and Affect: Mood normal.         Behavior: Behavior normal.         Thought Content: Thought content normal.         Assessment/Plan   Diagnoses and all orders for this visit:  Pneumonia of both upper lobes due to Mycoplasma pneumoniae  -     XR chest 2 views; Future  Gastroesophageal reflux disease without esophagitis  -     pantoprazole (ProtoNix) 20 mg EC tablet; Take 1 tablet (20 mg) by mouth once daily in the morning. Take before meals. Do not crush, chew, or split.    Patient also had an order for CT that was denied by the insurance company.  The CT have been ordered by her pulmonologist.  Per the paperwork she needed a repeat chest x-ray first so as to show persistent infection prior to another CT so we will have her get a chest x-ray and she will keep her appointment with her pulmonologist for next month.  In the meantime get her back on her Protonix.

## 2025-02-11 ASSESSMENT — PROMIS GLOBAL HEALTH SCALE
RATE_GENERAL_HEALTH: FAIR
CARRYOUT_PHYSICAL_ACTIVITIES: NOT AT ALL
RATE_QUALITY_OF_LIFE: FAIR
CARRYOUT_PHYSICAL_ACTIVITIES: NOT AT ALL
RATE_AVERAGE_PAIN: 7
RATE_AVERAGE_FATIGUE: SEVERE
RATE_AVERAGE_FATIGUE: SEVERE
EMOTIONAL_PROBLEMS: ALWAYS
EMOTIONAL_PROBLEMS: ALWAYS
RATE_PHYSICAL_HEALTH: POOR
RATE_PHYSICAL_HEALTH: POOR
RATE_SOCIAL_SATISFACTION: POOR
CARRYOUT_SOCIAL_ACTIVITIES: FAIR
RATE_QUALITY_OF_LIFE: FAIR
RATE_MENTAL_HEALTH: FAIR
RATE_AVERAGE_PAIN: 7
CARRYOUT_SOCIAL_ACTIVITIES: FAIR
RATE_MENTAL_HEALTH: FAIR
RATE_SOCIAL_SATISFACTION: POOR
RATE_GENERAL_HEALTH: FAIR

## 2025-02-12 ENCOUNTER — APPOINTMENT (OUTPATIENT)
Dept: PRIMARY CARE | Facility: CLINIC | Age: 45
End: 2025-02-12
Payer: COMMERCIAL

## 2025-02-19 ENCOUNTER — APPOINTMENT (OUTPATIENT)
Dept: RESPIRATORY THERAPY | Facility: HOSPITAL | Age: 45
End: 2025-02-19
Payer: COMMERCIAL

## 2025-03-05 ENCOUNTER — PATIENT OUTREACH (OUTPATIENT)
Dept: PRIMARY CARE | Facility: CLINIC | Age: 45
End: 2025-03-05
Payer: COMMERCIAL

## 2025-03-05 ENCOUNTER — APPOINTMENT (OUTPATIENT)
Dept: RESPIRATORY THERAPY | Facility: HOSPITAL | Age: 45
End: 2025-03-05
Payer: COMMERCIAL

## 2025-03-05 NOTE — PROGRESS NOTES
Call regarding F/U states doing okay , will see pulmonary soon awaiting information on testing. Patient encouraged to call Pulmonary D/T having issues with ins. C.T scan along with POX, as testing to be done prior to F/U , patient verbalizes understanding.     Encouraged patient to call providers for any questions concerns or change in condition.

## 2025-03-07 ENCOUNTER — APPOINTMENT (OUTPATIENT)
Dept: PRIMARY CARE | Facility: CLINIC | Age: 45
End: 2025-03-07
Payer: COMMERCIAL

## 2025-03-11 ENCOUNTER — HOSPITAL ENCOUNTER (OUTPATIENT)
Dept: RESPIRATORY THERAPY | Facility: HOSPITAL | Age: 45
Setting detail: THERAPIES SERIES
Discharge: HOME | End: 2025-03-11
Payer: COMMERCIAL

## 2025-03-11 DIAGNOSIS — J18.9 MULTIFOCAL PNEUMONIA: ICD-10-CM

## 2025-03-11 DIAGNOSIS — J96.01 ACUTE HYPOXEMIC RESPIRATORY FAILURE (MULTI): ICD-10-CM

## 2025-03-11 PROCEDURE — RXMED WILLOW AMBULATORY MEDICATION CHARGE

## 2025-03-11 PROCEDURE — 94618 PULMONARY STRESS TESTING: CPT

## 2025-03-11 NOTE — NURSING NOTE
Six minute walk test performed on patient. No supplemental oxygen required during walk. Patient states her biggest issue is terrible pain in her feet from diabetic neuropathy.

## 2025-03-12 NOTE — PROGRESS NOTES
Patient: Alanna Hickman    [id number]  : 1980 -- AGE 44 y.o.    Provider: Andrade Aguirre MD     Location  [facility]   Service Date: 3/14/2025          [facility]  Follow-Up Visit Note    History of Present Illness   Background:  Alanna Hickman is a 44 y.o. female presenting for follow-up of hypoxemia and multifocal PNA.    Today's (3/14/2025) HPI:   Patient seen after hospitalization in 2024 for multifocal pneumonia presumed due to M.pneumoniae, complicated by acute hypoxemic respiratory failure.  Patient was discharged on home oxygen, since then has weaned off as well as underwent oximetry walk testing which showed no exertional hypoxemia.  Since discharge, she is overall feeling better with still having ongoing cough which was present before her hospitalization as well.  She has been using short acting bronchodilators prescribed from her hospitalization which provides some relief.  She reports ongoing smoking.  Denies fever/chills.    Of note, patient also has history of severe SANGEETA, noted on PSG in  with AHI 3% 31.5.  Reports she tried auto CPAP, had issues with tolerance and so has no machine currently.  She still has ongoing symptoms.    Review of Systems:  Review of Systems   Constitutional:  Negative for chills, fever and unexpected weight change.   HENT:  Negative for congestion.    Respiratory:  Positive for cough. Negative for shortness of breath and wheezing.    Cardiovascular:  Negative for chest pain and leg swelling.   Psychiatric/Behavioral:  Positive for sleep disturbance.    All other systems reviewed and are negative.      Past Medical History   She has a past medical history of Arthritis, Bilateral hand pain, Eczema, Neuromuscular disorder (Multi), Stroke (Multi), and Type 2 diabetes mellitus.    Immunizations     Immunization History   Administered Date(s) Administered    Rho(D)-IG IM 2015    Td vaccine, age 7 years and  older (TDVAX) 09/12/2005       Medications and Allergies   Medications:  Current Outpatient Medications   Medication Instructions    albuterol (Ventolin HFA) 90 mcg/actuation inhaler 2 puffs, inhalation, Every 6 hours PRN    blood sugar diagnostic (OneTouch Ultra Test) strip USE TO TEST BLOOD SUGARS TWICE DAILY AS DIRECTED    blood-glucose meter misc Test glucose twice daily as directed    blood-glucose sensor (FreeStyle Jignesh 3 Sensor) device CHANGE SENSOR EVERY 14 DAYS AS DIRECTED.    clopidogrel (Plavix) 75 mg tablet 1 tablet, Daily    cyclobenzaprine (Flexeril) 10 mg tablet 0.5-1 tablets, Nightly PRN    Farxiga 10 mg, oral, Daily    fluticasone propion-salmeteroL (Advair HFA) 45-21 mcg/actuation inhaler 2 puffs, inhalation, 2 times daily RT, Rinse mouth with water after use to reduce aftertaste and incidence of candidiasis. Do not swallow.    FreeStyle Jignesh 3 [place] misc Use as instructed to read glucose    hydrOXYzine HCL (ATARAX) 25 mg, oral, Every 4 hours PRN    inhalational spacing device inhaler Use as directed with inhalers    insulin degludec (Tresiba FlexTouch U-100) 100 unit/mL (3 mL) injection INJECT 40 UNITS UNDER THE SKIN ONCE DAILY AT BEDTIME AS DIRECTED PER INSULIN INSTRUCTIONS    insulin lispro (HUMALOG) 8 Units, subcutaneous, 3 times daily (morning, midday, late afternoon), Take as directed per insulin instructions    ipratropium-albuteroL (Duo-Neb) 0.5-2.5 mg/3 mL nebulizer solution Inhale the contents of 1 vial (3 ml) by nebulization 4 times a day as needed for wheezing or shortness of breath.    lamoTRIgine (LaMICtal) 25 mg tablet     lancets misc Test sugars twice daily    levETIRAcetam (Keppra) 500 mg tablet     metFORMIN XR (GLUCOPHAGE-XR) 1,500 mg, oral, Daily    nebulizer and compressor (Comp-Air Nebulizer Compressor) device Use with nebulizer solution as directed    nicotine (Nicoderm CQ) 21 mg/24 hr patch APPLY ONE PATCH EXTERNALLY once EVERY 24 hours for 21 days    pantoprazole  "(PROTONIX) 20 mg, oral, Daily before breakfast, Do not crush, chew, or split.    QUEtiapine (SEROquel) 50 mg tablet TAKE 1 TABLET BY MOUTH AT NIGHT FOR 1 DOSE THEN INCREASE TO 2 TABLETS AT NIGHT FOR 1 DOSE. THEN INCREASE TO 3 TABLETS EVERY NIGHT IF TOLERAT    Sure Comfort Pen Needle 31 gauge x 3/16\" needle use once daily as directed        Allergies:  Patient has no known allergies.    Social History   She reports that she has quit smoking. Her smoking use included cigarettes. She started smoking about 25 years ago. She has a 25.8 pack-year smoking history. She has been exposed to tobacco smoke. She has never used smokeless tobacco. She reports that she does not currently use alcohol. She reports that she does not use drugs.    Smoking History: Current smoker  Exposure/Job History: Unremarkable    Family History     Family History   Problem Relation Name Age of Onset    Arthritis Mother sarah     Hypertension Mother sarah     Other (other conditons influening health) Mother sarah     Stroke Mother sarah     Heart failure Father      Heart attack Father      Diabetes type II Father      Other (severe back pain) Sister      Other (?OD) Sister          uncertain    Coronary artery disease Brother      Heart attack Brother      Other (back pain) Brother      Diabetes type II Brother      Other (bladder cancer) Brother      Other (Hunchback arthritis) Paternal Grandmother      Breast cancer Mother's Sister yanick     Asthma Daughter sanjana        Physical Exam   VITAL SIGNS: /86   Pulse 93   Temp 36 °C (96.8 °F)   Ht 1.676 m (5' 6\")   Wt 104 kg (230 lb)   SpO2 96%   BMI 37.12 kg/m²      Physical Exam  Vitals reviewed.   Constitutional:       General: She is not in acute distress.     Appearance: She is not ill-appearing.   HENT:      Head: Normocephalic and atraumatic.      Right Ear: External ear normal.      Left Ear: External ear normal.      Mouth/Throat:      Mouth: Mucous membranes are moist.     "  Pharynx: Oropharynx is clear.   Eyes:      General: No scleral icterus.     Extraocular Movements: Extraocular movements intact.      Conjunctiva/sclera: Conjunctivae normal.   Cardiovascular:      Rate and Rhythm: Normal rate and regular rhythm.      Heart sounds: Normal heart sounds.   Pulmonary:      Effort: Pulmonary effort is normal. No respiratory distress.      Breath sounds: Normal breath sounds.   Abdominal:      General: Abdomen is flat.      Palpations: Abdomen is soft.   Musculoskeletal:      Cervical back: Normal range of motion and neck supple.      Right lower leg: No edema.      Left lower leg: No edema.   Skin:     General: Skin is warm and dry.      Findings: No rash.   Neurological:      General: No focal deficit present.      Mental Status: She is alert. Mental status is at baseline.   Psychiatric:         Behavior: Behavior normal.         Thought Content: Thought content normal.         Pulmonary Function Test Results     O2 Desat (3/2025) - No exertional hypoxemia    Chest Imaging     XR chest 1 view 12/18/2024    Narrative  Interpreted By:  Steve Robles,  STUDY:  XR CHEST 1 VIEW;  12/18/2024 4:15 am    INDICATION:  Signs/Symptoms:SOB.      COMPARISON:  12/11/2024.    ACCESSION NUMBER(S):  [id number]    ORDERING CLINICIAN:  BRANDON ENRIQUEZ    FINDINGS:  AP radiograph of the chest was provided.    Leads overlie the chest, partially obscuring the field-of-view.  Apical kyphosis.    CARDIOMEDIASTINAL SILHOUETTE:  Cardiomediastinal silhouette is normal in size and configuration.    LUNGS:  Lungs appear clear. No pleural effusion or pneumothorax.    ABDOMEN:  No remarkable upper abdominal findings.    BONES:  No acute osseous changes.    Impression  1.  No definite evidence of acute cardiopulmonary process.        MACRO:  None    Signed by: Steve Robles 12/18/2024 4:19 AM  Dictation workstation:   [id number]      CT angio chest for pulmonary embolism 12/18/2024    Narrative  STUDY:  CT Angiogram  of the Chest; 12/18/2024 5:12 AM  INDICATION:  Shortness of breath, orthopnea, recent hospitalization.  COMPARISON:  CTA chest 12/08/2024.  ACCESSION NUMBER(S):  [id number]  ORDERING CLINICIAN:  BRANDON ENRIQUEZ  TECHNIQUE:  CTA of the chest was performed with intravenous contrast.  Images are reviewed and processed at a workstation according to the CT  angiogram protocol with 3-D and/or MIP post processing imaging  generated.  Omnipaque 350 75 mL was administered intravenously.  Automated mA/kV exposure control was utilized and patient examination  was performed in strict accordance with principles of ALARA.  FINDINGS:  No evidence of acute pulmonary embolism.  The thoracic aorta is normal  in course and caliber without dissection or aneurysm.  The heart is normal in size without pericardial effusion.  Thoracic  lymph nodes are not enlarged.  Patchy bilateral ground glass consolidation, right greater than left.  No pneumothorax or pleural effusion.  The airways are patent. There is  evidence of prior granulomatous disease.  Partially imaged calcifications in the region of the pancreatic head.  Correlate clinically for history of chronic pancreatitis.  There are no acute fractures.  No suspicious bony lesions.    Impression  No evidence of acute pulmonary embolism.  Patchy bilateral consolidation, likely infectious/inflammatory in  etiology, which appears slightly improved in the lower lobes but  worsened in the right upper lobe compared to prior.  Signed by Justice Menard MD      Echocardiogram     TTE (12/2024)  CONCLUSIONS:   1. Left ventricular ejection fraction is normal, by visual estimate at 60-65%.   2. Spectral Doppler shows a Grade I (impaired relaxation pattern) of left ventricular diastolic filling with normal left atrial filling pressure.   3. There is normal right ventricular global systolic function.    Labs/Cultures     Lab Results   Component Value Date    WBC 10.0 02/01/2025    HGB 16.9 (H)  "02/01/2025    HCT 50.7 (H) 02/01/2025    MCV 96 02/01/2025     02/01/2025       Lab Results   Component Value Date    CREATININE 0.54 02/01/2025    BUN 10 02/01/2025     (L) 02/01/2025    K 4.2 02/01/2025    CL 92 (L) 02/01/2025    CO2 28 02/01/2025        Lab Results   Component Value Date    SHARA Positive (A) 12/19/2024    RF <10 12/19/2024    SEDRATE 59 (H) 12/19/2024        IgE: No results found for: \"IGE\"     AEC:   Eosinophils Absolute (x10*3/uL)   Date Value   02/01/2025 0.09     Eosinophils % (%)   Date Value   02/01/2025 0.9       Assessment and Plan     Alanna Hickman is a 44 y.o. female presenting for follow-up of hypoxemia, multifocal PNA, SANGEETA.    Problem List Items Addressed This Visit       Cigarette nicotine dependence    Current Assessment & Plan     Will perform full PFTs. Counseled on risks of ongoing smoking.         Relevant Medications    fluticasone propion-salmeteroL (Advair HFA) 45-21 mcg/actuation inhaler    Other Relevant Orders    In-Center Sleep Study    CT chest wo IV contrast    Follow Up In Pulmonology    Exhaled Nitric Oxide (FeNO)    Acute hypoxemic respiratory failure (Multi)    Current Assessment & Plan     Now has resolved, oximetry test without exertional hypoxemia.  Will send order to discontinue oxygen. Obtaining PFTs to evaluate for underlying airway disease (e.g. COPD).         Relevant Medications    fluticasone propion-salmeteroL (Advair HFA) 45-21 mcg/actuation inhaler    Other Relevant Orders    Complete Pulmonary Function Test Pre/Post Bronchodilator (Spirometry Pre/Post/DLCO/Lung Volumes)    CT chest wo IV contrast    Follow Up In Pulmonology    Exhaled Nitric Oxide (FeNO)    Multifocal pneumonia - Primary    Current Assessment & Plan     Will repeat chest imaging to ensure resolution of abnormality seen during hospitalization.         Relevant Medications    fluticasone propion-salmeteroL (Advair HFA) 45-21 mcg/actuation inhaler    Other Relevant Orders "    Complete Pulmonary Function Test Pre/Post Bronchodilator (Spirometry Pre/Post/DLCO/Lung Volumes)    CT chest wo IV contrast    Follow Up In Pulmonology    Exhaled Nitric Oxide (FeNO)    Obstructive sleep apnea (adult) (pediatric)    Overview     PSG (2023) - Severe SANGEETA, AHI3% 31.5         Current Assessment & Plan     Discussed with patient, warrants treatment especially with comorbid lung disease.  Given intolerance of CPAP prior, will perform PAP titration to then obtain new PAP prescription.         Relevant Medications    fluticasone propion-salmeteroL (Advair HFA) 45-21 mcg/actuation inhaler    Other Relevant Orders    In-Center Sleep Study    Follow Up In Pulmonology      Follow-up: 3 months    I independently reviewed available labs, imaging, outside records and provider notes pertinent to today's visit.    Andraed Aguirre MD   of Medicine, [facility] [employer]  [facility] Division of Pulmonary, Critical Care and Sleep Medicine  10:35 AM  03/14/25

## 2025-03-13 ENCOUNTER — OFFICE VISIT (OUTPATIENT)
Dept: PULMONOLOGY | Facility: CLINIC | Age: 45
End: 2025-03-13
Payer: COMMERCIAL

## 2025-03-13 VITALS
WEIGHT: 230 LBS | HEIGHT: 66 IN | SYSTOLIC BLOOD PRESSURE: 123 MMHG | DIASTOLIC BLOOD PRESSURE: 86 MMHG | BODY MASS INDEX: 36.96 KG/M2 | HEART RATE: 93 BPM | OXYGEN SATURATION: 96 % | TEMPERATURE: 96.8 F

## 2025-03-13 DIAGNOSIS — J96.01 ACUTE HYPOXEMIC RESPIRATORY FAILURE (MULTI): ICD-10-CM

## 2025-03-13 DIAGNOSIS — J18.9 MULTIFOCAL PNEUMONIA: Primary | ICD-10-CM

## 2025-03-13 DIAGNOSIS — G47.33 OBSTRUCTIVE SLEEP APNEA (ADULT) (PEDIATRIC): ICD-10-CM

## 2025-03-13 DIAGNOSIS — F17.210 CIGARETTE NICOTINE DEPENDENCE WITHOUT COMPLICATION: ICD-10-CM

## 2025-03-13 PROCEDURE — 3079F DIAST BP 80-89 MM HG: CPT | Performed by: HOSPITALIST

## 2025-03-13 PROCEDURE — 99215 OFFICE O/P EST HI 40 MIN: CPT | Performed by: HOSPITALIST

## 2025-03-13 PROCEDURE — 3008F BODY MASS INDEX DOCD: CPT | Performed by: HOSPITALIST

## 2025-03-13 PROCEDURE — 3074F SYST BP LT 130 MM HG: CPT | Performed by: HOSPITALIST

## 2025-03-13 RX ORDER — FLUTICASONE PROPIONATE AND SALMETEROL XINAFOATE 45; 21 UG/1; UG/1
2 AEROSOL, METERED RESPIRATORY (INHALATION)
Qty: 12 G | Refills: 11 | Status: SHIPPED | OUTPATIENT
Start: 2025-03-13 | End: 2026-03-13

## 2025-03-13 ASSESSMENT — PAIN SCALES - GENERAL: PAINLEVEL_OUTOF10: 6

## 2025-03-13 NOTE — Clinical Note
Tj Trinh,  Can we send an order to patient's O2 provider to stop? She had oximetry test this month. I think her DME was [employer]?  Thanks!

## 2025-03-13 NOTE — PATIENT INSTRUCTIONS
[facility]  [facility]   [facility]  [address]   [place] [address]       NAME: Alanna Hickman   DATE: 3/13/2025     Your Pulmonary Physician Today: Andrade Aguirre MD  Your Primary Care Provider: Ansley Shetty PA-C     DIAGNOSIS:  1. Multifocal pneumonia  Follow Up In Pulmonology    Complete Pulmonary Function Test Pre/Post Bronchodilator (Spirometry Pre/Post/DLCO/Lung Volumes)    fluticasone propion-salmeteroL (Advair HFA) 45-21 mcg/actuation inhaler    CT chest wo IV contrast    Follow Up In Pulmonology    Exhaled Nitric Oxide (FeNO)      2. Acute hypoxemic respiratory failure (Multi)  Follow Up In Pulmonology    Complete Pulmonary Function Test Pre/Post Bronchodilator (Spirometry Pre/Post/DLCO/Lung Volumes)    fluticasone propion-salmeteroL (Advair HFA) 45-21 mcg/actuation inhaler    CT chest wo IV contrast    Follow Up In Pulmonology    Exhaled Nitric Oxide (FeNO)      3. Cigarette nicotine dependence without complication  fluticasone propion-salmeteroL (Advair HFA) 45-21 mcg/actuation inhaler    In-Center Sleep Study    CT chest wo IV contrast    Follow Up In Pulmonology    Exhaled Nitric Oxide (FeNO)      4. Obstructive sleep apnea (adult) (pediatric)  fluticasone propion-salmeteroL (Advair HFA) 45-21 mcg/actuation inhaler    In-Center Sleep Study    Follow Up In Pulmonology        Thank you for coming to the Pulmonary Medicine Clinic today! Below is a summary of your treatment plan, other important information, and our contact numbers:    TREATMENT PLAN     We discussed the followin.) I am glad you are off oxygen. Let's get some breathing tests and a CT scan to see why you are still having ongoing cough.  2.) We should also get a new sleep study (on CPAP) to get you back on therapy for sleep apnea.    Tests that need to be scheduled:  1.) Pulmonary function tests  2.) Sleep study  3.) CT scan    Medications/inhalers  prescribed:  1.) Advair (daily inhaler for asthma/coughing)    Follow-up Appointment: 3 months    IMPORTANT INFORMATION     Call 911 for medical emergencies.  Our offices are generally open from Monday-Friday, 9 am - 5 pm.  If you need to get in touch with me, you may either call me and my team(number is below) or you can use [employer].    IMPORTANT PHONE NUMBERS (FOR SCHEDULING/QUESTIONS)     Pulmonary Department Main Line: [phone]   Pulmonary Nurse (Gayathri Carvalho RN): [phone]    For scheduling purposes:  Breathing (pulmonary function) or a walking test: [phone]   EKG's, Echocardiograms and Cardiopulmonary Stress Tests: [phone]   Radiology tests such as Nuclear Medicine, CT Scans, and MRI's: [phone]  Pulmonary clinic follow-up: [phone]      For sleep studies, Our team will contact you, however, you can also contact us as follows:  Main Phone Line (scheduling only): [phone]), option 3  Adult and Pediatric Locations  [facility] (6 years and older): [facility] - 4th floor ([facility]) After hours line: [phone]  [facility] (Main campus: All ages): [facility], 6th floor. After hours line: [phone]   [place] (5 years and older; younger considered on case-by-case basis): [address]; [address], [address]. Scheduling  After hours line: [phone]   [place] (6 years and older): [address]; [address]; [address]   [place] (6 years and older): [address] [facility], [address]  After hours line: [phone]   [place] (13 years and older) in [place]: [address], 2nd floor  After hours line: [phone]   [place] (13 year and older): [address]  After hours line: [phone]     Adult Only Locations:   [place] (18 years and older): [address] [facility], 2nd floor   [place] (18 years and older): [address]; 4th floor  " After hours line: [phone]   [place] (18 years and older) at [place]: [id number] [place] [address]  After hours line: [phone]     OUR ADULT PULMONARY MEDICINE TEAM   Please do not hesitate to call the office or sleep nurse with any questions between appointments:    Adult Pulmonary Nurse (Gayathri Carvalho RN):  For questions about your diagnosis, care plan and refilling prescriptions: [phone]    Adult Pulmonary Medicine  (Lucia Bear):  Please contact for scheduling issues: P: [phone]  F: [phone]    CONTACTING YOUR PULMONARY PHYSICIAN     Send a message directly to your physician through \"My Chart\", which is the email service through your  Records Account: [email or web address]   Call [phone] and leave a message. One of the administrative assistants will forward the message to your physician through \"My Chart\" and/or email.     PRESCRIPTIONS     We require 7 days advanced notice for prescription refills. If we do not receive the request in this time, we cannot guarantee that your medication will be refilled in time.    Andrade Aguirre MD   of Medicine, [facility] [employer]  [facility] Division of Pulmonary, Critical Care and Sleep Medicine  "

## 2025-03-14 ENCOUNTER — PHARMACY VISIT (OUTPATIENT)
Dept: PHARMACY | Facility: CLINIC | Age: 45
End: 2025-03-14
Payer: MEDICAID

## 2025-03-14 ASSESSMENT — ENCOUNTER SYMPTOMS
SLEEP DISTURBANCE: 1
WHEEZING: 0
SHORTNESS OF BREATH: 0
COUGH: 1
CHILLS: 0
UNEXPECTED WEIGHT CHANGE: 0
FEVER: 0

## 2025-03-14 NOTE — ASSESSMENT & PLAN NOTE
Discussed with patient, warrants treatment especially with comorbid lung disease.  Given intolerance of CPAP prior, will perform PAP titration to then obtain new PAP prescription.

## 2025-03-14 NOTE — ASSESSMENT & PLAN NOTE
Now has resolved, oximetry test without exertional hypoxemia.  Will send order to discontinue oxygen. Obtaining PFTs to evaluate for underlying airway disease (e.g. COPD).

## 2025-03-17 ENCOUNTER — TELEPHONE (OUTPATIENT)
Dept: PRIMARY CARE | Facility: CLINIC | Age: 45
End: 2025-03-17

## 2025-03-17 ENCOUNTER — APPOINTMENT (OUTPATIENT)
Dept: PRIMARY CARE | Facility: CLINIC | Age: 45
End: 2025-03-17
Payer: COMMERCIAL

## 2025-03-17 VITALS
HEART RATE: 84 BPM | SYSTOLIC BLOOD PRESSURE: 114 MMHG | OXYGEN SATURATION: 95 % | DIASTOLIC BLOOD PRESSURE: 84 MMHG | BODY MASS INDEX: 37.12 KG/M2 | WEIGHT: 230 LBS

## 2025-03-17 DIAGNOSIS — Z79.4 TYPE 2 DIABETES MELLITUS WITHOUT COMPLICATION, WITH LONG-TERM CURRENT USE OF INSULIN (MULTI): ICD-10-CM

## 2025-03-17 DIAGNOSIS — Z11.59 NEED FOR HEPATITIS C SCREENING TEST: ICD-10-CM

## 2025-03-17 DIAGNOSIS — Z12.12 SCREENING FOR COLORECTAL CANCER: ICD-10-CM

## 2025-03-17 DIAGNOSIS — E11.9 TYPE 2 DIABETES MELLITUS WITHOUT COMPLICATION, WITH LONG-TERM CURRENT USE OF INSULIN (MULTI): ICD-10-CM

## 2025-03-17 DIAGNOSIS — J45.20 MILD INTERMITTENT ASTHMA, UNSPECIFIED WHETHER COMPLICATED (HHS-HCC): ICD-10-CM

## 2025-03-17 DIAGNOSIS — J45.20 MILD INTERMITTENT ASTHMA WITHOUT COMPLICATION (HHS-HCC): ICD-10-CM

## 2025-03-17 DIAGNOSIS — E11.40 TYPE 2 DIABETES MELLITUS WITH DIABETIC NEUROPATHY, WITH LONG-TERM CURRENT USE OF INSULIN: ICD-10-CM

## 2025-03-17 DIAGNOSIS — Z79.4 TYPE 2 DIABETES MELLITUS WITH DIABETIC NEUROPATHY, WITH LONG-TERM CURRENT USE OF INSULIN: ICD-10-CM

## 2025-03-17 DIAGNOSIS — E11.42 DIABETIC POLYNEUROPATHY ASSOCIATED WITH TYPE 2 DIABETES MELLITUS (MULTI): ICD-10-CM

## 2025-03-17 DIAGNOSIS — Z12.11 SCREENING FOR COLORECTAL CANCER: ICD-10-CM

## 2025-03-17 DIAGNOSIS — F32.89 OTHER DEPRESSION: ICD-10-CM

## 2025-03-17 DIAGNOSIS — F41.9 ANXIETY: ICD-10-CM

## 2025-03-17 DIAGNOSIS — Z00.00 ROUTINE GENERAL MEDICAL EXAMINATION AT A HEALTH CARE FACILITY: Primary | ICD-10-CM

## 2025-03-17 DIAGNOSIS — F17.210 CIGARETTE NICOTINE DEPENDENCE WITHOUT COMPLICATION: ICD-10-CM

## 2025-03-17 DIAGNOSIS — E78.5 HYPERLIPIDEMIA, UNSPECIFIED HYPERLIPIDEMIA TYPE: ICD-10-CM

## 2025-03-17 PROBLEM — R45.1 RESTLESSNESS AND AGITATION: Status: RESOLVED | Noted: 2023-09-16 | Resolved: 2025-03-17

## 2025-03-17 PROBLEM — B02.9 HERPES ZOSTER: Status: RESOLVED | Noted: 2022-08-23 | Resolved: 2025-03-17

## 2025-03-17 PROBLEM — E86.0 DEHYDRATION: Status: RESOLVED | Noted: 2024-12-08 | Resolved: 2025-03-17

## 2025-03-17 PROBLEM — R41.82 ALTERED MENTAL STATUS: Status: RESOLVED | Noted: 2023-09-16 | Resolved: 2025-03-17

## 2025-03-17 PROBLEM — E87.1 HYPONATREMIA: Status: RESOLVED | Noted: 2022-08-16 | Resolved: 2025-03-17

## 2025-03-17 PROBLEM — J96.01 ACUTE HYPOXEMIC RESPIRATORY FAILURE: Status: RESOLVED | Noted: 2024-12-08 | Resolved: 2025-03-17

## 2025-03-17 PROBLEM — E87.1 HYPO-OSMOLALITY AND HYPONATREMIA: Status: RESOLVED | Noted: 2023-09-16 | Resolved: 2025-03-17

## 2025-03-17 PROBLEM — S66.919A RUPTURE OF TENDON OF HAND: Status: RESOLVED | Noted: 2023-07-14 | Resolved: 2025-03-17

## 2025-03-17 PROBLEM — E86.1 HYPOVOLEMIA: Status: RESOLVED | Noted: 2023-09-16 | Resolved: 2025-03-17

## 2025-03-17 PROBLEM — R82.90 ABNORMAL FINDING IN URINE: Status: RESOLVED | Noted: 2022-04-12 | Resolved: 2025-03-17

## 2025-03-17 PROBLEM — J96.01 ACUTE HYPOXIC RESPIRATORY FAILURE: Status: RESOLVED | Noted: 2024-12-18 | Resolved: 2025-03-17

## 2025-03-17 PROBLEM — L02.214 INGUINAL ABSCESS: Status: RESOLVED | Noted: 2021-10-14 | Resolved: 2025-03-17

## 2025-03-17 PROBLEM — J18.9 MULTIFOCAL PNEUMONIA: Status: RESOLVED | Noted: 2024-12-08 | Resolved: 2025-03-17

## 2025-03-17 PROCEDURE — 3074F SYST BP LT 130 MM HG: CPT | Performed by: PHYSICIAN ASSISTANT

## 2025-03-17 PROCEDURE — 3079F DIAST BP 80-89 MM HG: CPT | Performed by: PHYSICIAN ASSISTANT

## 2025-03-17 PROCEDURE — 99396 PREV VISIT EST AGE 40-64: CPT | Performed by: PHYSICIAN ASSISTANT

## 2025-03-17 RX ORDER — INSULIN DEGLUDEC 100 U/ML
40 INJECTION, SOLUTION SUBCUTANEOUS NIGHTLY
Qty: 15 ML | Refills: 3 | Status: SHIPPED | OUTPATIENT
Start: 2025-03-17

## 2025-03-17 RX ORDER — SULFASALAZINE 500 MG/1
TABLET ORAL
COMMUNITY
Start: 2025-02-18

## 2025-03-17 RX ORDER — LAMOTRIGINE 150 MG/1
1 TABLET ORAL
COMMUNITY
Start: 2025-03-07

## 2025-03-17 RX ORDER — ALBUTEROL SULFATE 90 UG/1
2 INHALANT RESPIRATORY (INHALATION) EVERY 6 HOURS PRN
Qty: 18 G | Refills: 2 | Status: SHIPPED | OUTPATIENT
Start: 2025-03-17

## 2025-03-17 RX ORDER — LORAZEPAM 1 MG/1
1 TABLET ORAL DAILY PRN
COMMUNITY
Start: 2025-03-11

## 2025-03-17 ASSESSMENT — ENCOUNTER SYMPTOMS
CONSTIPATION: 0
PALPITATIONS: 0
LIGHT-HEADEDNESS: 0
SHORTNESS OF BREATH: 0
ACTIVITY CHANGE: 0
APPETITE CHANGE: 0
COLOR CHANGE: 0
DIARRHEA: 0
CHEST TIGHTNESS: 0
ABDOMINAL PAIN: 0
DIZZINESS: 0
BLOOD IN STOOL: 0
NAUSEA: 0
FREQUENCY: 0
WHEEZING: 0
TREMORS: 0

## 2025-03-17 ASSESSMENT — PAIN SCALES - GENERAL: PAINLEVEL_OUTOF10: 6

## 2025-03-17 ASSESSMENT — PATIENT HEALTH QUESTIONNAIRE - PHQ9
SUM OF ALL RESPONSES TO PHQ9 QUESTIONS 1 AND 2: 2
10. IF YOU CHECKED OFF ANY PROBLEMS, HOW DIFFICULT HAVE THESE PROBLEMS MADE IT FOR YOU TO DO YOUR WORK, TAKE CARE OF THINGS AT HOME, OR GET ALONG WITH OTHER PEOPLE: SOMEWHAT DIFFICULT
1. LITTLE INTEREST OR PLEASURE IN DOING THINGS: SEVERAL DAYS
2. FEELING DOWN, DEPRESSED OR HOPELESS: SEVERAL DAYS

## 2025-03-17 NOTE — PROGRESS NOTES
Subjective   Patient ID: Alanna Hickman is a 44 y.o. female who presents for Annual Exam.    Diabetes  She presents for her follow-up diabetic visit. She has type 2 diabetes mellitus. Her disease course has been fluctuating. Hypoglycemia symptoms include nervousness/anxiousness. Pertinent negatives for hypoglycemia include no dizziness or tremors. Associated symptoms include fatigue. Pertinent negatives for diabetes include no chest pain. There are no hypoglycemic complications. Symptoms are stable. Risk factors for coronary artery disease include diabetes mellitus, dyslipidemia, stress and tobacco exposure. She rarely participates in exercise. Her breakfast blood glucose range is generally 180-200 mg/dl. An ACE inhibitor/angiotensin II receptor blocker is being taken. She does not see a podiatrist.Eye exam is current.    She has had a stroke in the past.  Sees neurology and rheumatology for autoimmune disorder.  She had tested positive for HLA-B27 for possibility for ankylosing spondylitis.    Review of Systems   Constitutional:  Positive for fatigue. Negative for activity change and appetite change.   HENT:  Negative for dental problem.    Eyes:  Negative for photophobia and visual disturbance.   Respiratory:  Positive for cough. Negative for chest tightness, shortness of breath and wheezing.    Cardiovascular:  Negative for chest pain, palpitations and leg swelling.   Gastrointestinal:  Negative for abdominal pain, blood in stool, constipation, diarrhea and nausea.   Endocrine: Negative for cold intolerance and heat intolerance.   Genitourinary:  Negative for frequency and urgency.   Musculoskeletal:  Positive for arthralgias, back pain, gait problem and myalgias.   Skin:  Negative for color change.   Allergic/Immunologic: Negative for immunocompromised state.   Neurological:  Negative for dizziness, tremors and light-headedness.   Psychiatric/Behavioral:  Positive for behavioral problems, dysphoric mood and  sleep disturbance. The patient is nervous/anxious.        Objective   /84   Pulse 84   Wt 104 kg (230 lb)   LMP  (LMP Unknown)   SpO2 95%   BMI 37.12 kg/m²     Physical Exam  Constitutional:       Appearance: She is obese.   HENT:      Right Ear: Tympanic membrane normal.      Left Ear: Tympanic membrane normal.      Nose: Nose normal.      Mouth/Throat:      Mouth: Mucous membranes are moist.   Eyes:      Extraocular Movements: Extraocular movements intact.      Pupils: Pupils are equal, round, and reactive to light.   Neck:      Thyroid: No thyromegaly.      Vascular: No carotid bruit.   Cardiovascular:      Rate and Rhythm: Normal rate and regular rhythm.      Pulses: Normal pulses.   Pulmonary:      Effort: Pulmonary effort is normal. No respiratory distress.   Chest:   Breasts:     Right: No swelling, bleeding or inverted nipple.      Left: No swelling, bleeding or inverted nipple.   Abdominal:      General: Bowel sounds are normal.      Tenderness: There is no abdominal tenderness.   Musculoskeletal:      Cervical back: Normal range of motion. No tenderness.      Right lower leg: No edema.      Left lower leg: No edema.   Feet:      Right foot:      Protective Sensation: 3 sites tested.  3 sites sensed.      Skin integrity: Skin integrity normal.      Left foot:      Protective Sensation: 3 sites tested.  3 sites sensed.      Skin integrity: Skin integrity normal.   Skin:     General: Skin is warm.   Neurological:      General: No focal deficit present.      Mental Status: She is alert. Mental status is at baseline.   Psychiatric:         Mood and Affect: Mood normal.         Thought Content: Thought content normal.         Judgment: Judgment normal.         Assessment/Plan   Diagnoses and all orders for this visit:  Routine general medical examination at a health care facility  -     Albumin-Creatinine Ratio, Urine Random; Future  -     CBC and Auto Differential; Future  -     Comprehensive  Metabolic Panel; Future  -     Hemoglobin A1C; Future  -     Lipid Panel; Future  -     TSH with reflex to Free T4 if abnormal; Future  Anxiety  -     TSH with reflex to Free T4 if abnormal; Future  Other depression  -     TSH with reflex to Free T4 if abnormal; Future  Mild intermittent asthma without complication ([facility])  Cigarette nicotine dependence without complication  Type 2 diabetes mellitus without complication, with long-term current use of insulin ([facility])  -     Albumin-Creatinine Ratio, Urine Random; Future  -     CBC and Auto Differential; Future  -     Comprehensive Metabolic Panel; Future  -     Hemoglobin A1C; Future  -     Lipid Panel; Future  -     TSH with reflex to Free T4 if abnormal; Future  Diabetic polyneuropathy associated with type 2 diabetes mellitus ([facility])  Hyperlipidemia, unspecified hyperlipidemia type  -     Albumin-Creatinine Ratio, Urine Random; Future  -     CBC and Auto Differential; Future  -     Comprehensive Metabolic Panel; Future  -     Hemoglobin A1C; Future  -     Lipid Panel; Future  -     TSH with reflex to Free T4 if abnormal; Future  Need for hepatitis C screening test  -     Hepatitis C Antibody; Future  Screening for colorectal cancer  Type 2 diabetes mellitus with diabetic neuropathy, with long-term current use of insulin  -     insulin degludec (Tresiba FlexTouch U-100) 100 unit/mL (3 mL) pen; Inject 40 Units under the skin once daily at bedtime. Take as directed per insulin instructions.  Mild intermittent asthma, unspecified whether complicated ([facility])  -     albuterol (Ventolin HFA) 90 mcg/actuation inhaler; Inhale 2 puffs every 6 hours if needed for wheezing.  Other orders  -     Follow Up In Primary Care - Established; Future  -     Follow Up In Primary Care - Established; Future    Continue to use insulin according to her current schedule.  Discussed the importance of checking blood sugars as well.  Also reiterated the importance of quitting smoking.  Will  follow-up in 3 months.

## 2025-03-21 ASSESSMENT — ENCOUNTER SYMPTOMS
MYALGIAS: 1
NERVOUS/ANXIOUS: 1
BACK PAIN: 1
FATIGUE: 1
SLEEP DISTURBANCE: 1
DYSPHORIC MOOD: 1
PHOTOPHOBIA: 0
COUGH: 1
ARTHRALGIAS: 1

## 2025-03-23 ENCOUNTER — APPOINTMENT (OUTPATIENT)
Dept: RADIOLOGY | Facility: HOSPITAL | Age: 45
End: 2025-03-23
Payer: COMMERCIAL

## 2025-03-24 ENCOUNTER — TELEPHONE (OUTPATIENT)
Dept: SLEEP MEDICINE | Facility: HOSPITAL | Age: 45
End: 2025-03-24
Payer: COMMERCIAL

## 2025-03-24 ENCOUNTER — TELEPHONE (OUTPATIENT)
Dept: PULMONOLOGY | Facility: HOSPITAL | Age: 45
End: 2025-03-24
Payer: COMMERCIAL

## 2025-03-24 DIAGNOSIS — G47.30 SLEEP-RELATED BREATHING DISORDER: ICD-10-CM

## 2025-03-24 NOTE — TELEPHONE ENCOUNTER
Pt requests order to discontinue home oxygen. Pended order for Dr Aguirre, see last note from visit.

## 2025-03-24 NOTE — TELEPHONE ENCOUNTER
Order to discontinue home oxygen therapy was faxed to [facility] at [phone]. Fax confirmation was received. Pt was updated via [employer] message.

## 2025-03-26 ENCOUNTER — TELEPHONE (OUTPATIENT)
Dept: PRIMARY CARE | Facility: CLINIC | Age: 45
End: 2025-03-26
Payer: COMMERCIAL

## 2025-03-26 DIAGNOSIS — E11.9 TYPE 2 DIABETES MELLITUS WITHOUT COMPLICATION, WITHOUT LONG-TERM CURRENT USE OF INSULIN: Primary | ICD-10-CM

## 2025-03-26 RX ORDER — BLOOD-GLUCOSE,RECEIVER,CONT
EACH MISCELLANEOUS
Qty: 1 EACH | Refills: 0 | Status: SHIPPED | OUTPATIENT
Start: 2025-03-26

## 2025-03-26 RX ORDER — BLOOD-GLUCOSE SENSOR
EACH MISCELLANEOUS
Qty: 9 EACH | Refills: 3 | Status: SHIPPED | OUTPATIENT
Start: 2025-03-26

## 2025-03-26 NOTE — TELEPHONE ENCOUNTER
Received this email from patient today:    Tj Rivera. My sensor isnt working properly. i wondered if you had an extra freestyle seamus 3 sensor there, or if we could order a new blood meter system because i kind of need to check my blood sugars and through no fault of my own they keep falling off. Please let me know when you can.    sincerely,          Alanna Wan

## 2025-03-27 ENCOUNTER — TELEPHONE (OUTPATIENT)
Dept: PRIMARY CARE | Facility: CLINIC | Age: 45
End: 2025-03-27
Payer: COMMERCIAL

## 2025-03-27 NOTE — TELEPHONE ENCOUNTER
Rae called from [facility] [phone] pt would like a referral to see a nutritionist to work on her diabetes and weight control they  also gave her a free member ship to the

## 2025-03-31 ENCOUNTER — OFFICE VISIT (OUTPATIENT)
Dept: PRIMARY CARE | Facility: CLINIC | Age: 45
End: 2025-03-31
Payer: COMMERCIAL

## 2025-03-31 VITALS
WEIGHT: 238.6 LBS | DIASTOLIC BLOOD PRESSURE: 90 MMHG | SYSTOLIC BLOOD PRESSURE: 150 MMHG | OXYGEN SATURATION: 98 % | HEART RATE: 87 BPM | BODY MASS INDEX: 38.51 KG/M2 | TEMPERATURE: 97.4 F

## 2025-03-31 DIAGNOSIS — L02.214 ABSCESS OF GROIN, LEFT: Primary | ICD-10-CM

## 2025-03-31 PROCEDURE — 4004F PT TOBACCO SCREEN RCVD TLK: CPT

## 2025-03-31 PROCEDURE — 99213 OFFICE O/P EST LOW 20 MIN: CPT

## 2025-03-31 PROCEDURE — 3077F SYST BP >= 140 MM HG: CPT

## 2025-03-31 PROCEDURE — 3080F DIAST BP >= 90 MM HG: CPT

## 2025-03-31 RX ORDER — SULFAMETHOXAZOLE AND TRIMETHOPRIM 800; 160 MG/1; MG/1
1 TABLET ORAL 2 TIMES DAILY
Qty: 14 TABLET | Refills: 0 | Status: SHIPPED | OUTPATIENT
Start: 2025-03-31 | End: 2025-04-07

## 2025-03-31 ASSESSMENT — PAIN SCALES - GENERAL: PAINLEVEL_OUTOF10: 10-WORST PAIN EVER

## 2025-03-31 NOTE — PROGRESS NOTES
Subjective   Patient ID: Alanna Hickman is a 44 y.o. female who presents for Groin Swelling (Pt states they noticed a painful red growth that is increasing in size since Friday 3/27/25 Pt has taken a bleach bath with no relief in symptoms. Pt notices that it's a recurring issue that she gets these painful skin growths that end up needing to be surgically removed.).    HPI   Alanna is seen for c/o painful lesion in groin since Friday. Pain is increasing. Reports history of skin infections/growths which require surgical removal, most recent was 1 year ago. Has tried taking a bleach bath with little relief. No injury to area prior to this. On blood thinner. Denies fever, chills, drainage from site.     Review of Systems  All other systems have been reviewed and are negative except as noted in the HPI.     Objective   /90 (BP Location: Right arm, Patient Position: Sitting)   Pulse 87   Temp 36.3 °C (97.4 °F) (Temporal)   Wt 108 kg (238 lb 9.6 oz)   LMP  (LMP Unknown)   SpO2 98%   BMI 38.51 kg/m²     Physical Exam  Vitals and nursing note reviewed.   Constitutional:       General: She is not in acute distress.  Eyes:      Extraocular Movements: Extraocular movements intact.      Conjunctiva/sclera: Conjunctivae normal.   Cardiovascular:      Rate and Rhythm: Normal rate.   Pulmonary:      Effort: Pulmonary effort is normal.   Abdominal:       Musculoskeletal:      Cervical back: Neck supple.   Neurological:      General: No focal deficit present.      Mental Status: She is alert.   Psychiatric:         Mood and Affect: Mood normal.         Assessment/Plan   Assessment & Plan  Abscess of groin, left  Acute.   Due to diffuse induration and lack of localized infection will opt to treat with antibiotics at this time. Start Bactrim as directed. Risks and benefits of medication discussed and prescribed. Apply warm compresses for 10-15 minutes at a time. Cleanse area twice daily with antibacterial soap, keep  dry.   Follow up in 3-4 days for recheck, may consider drainage at that time if appropriate. Discussed symptoms which prompt follow up sooner. Patient understands and agrees with plan.   Reviewed with and patient evaluated by Rocky Márquez MD.     Orders:    sulfamethoxazole-trimethoprim (Bactrim DS) 800-160 mg tablet; Take 1 tablet by mouth 2 times a day for 7 days.

## 2025-04-01 DIAGNOSIS — E11.9 TYPE 2 DIABETES MELLITUS WITHOUT COMPLICATION, WITH LONG-TERM CURRENT USE OF INSULIN: ICD-10-CM

## 2025-04-01 DIAGNOSIS — Z79.4 TYPE 2 DIABETES MELLITUS WITHOUT COMPLICATION, WITH LONG-TERM CURRENT USE OF INSULIN: ICD-10-CM

## 2025-04-04 ENCOUNTER — APPOINTMENT (OUTPATIENT)
Dept: PRIMARY CARE | Facility: CLINIC | Age: 45
End: 2025-04-04
Payer: COMMERCIAL

## 2025-04-06 ENCOUNTER — APPOINTMENT (OUTPATIENT)
Dept: RADIOLOGY | Facility: HOSPITAL | Age: 45
End: 2025-04-06
Payer: COMMERCIAL

## 2025-04-11 ENCOUNTER — TELEPHONE (OUTPATIENT)
Dept: PRIMARY CARE | Facility: CLINIC | Age: 45
End: 2025-04-11
Payer: COMMERCIAL

## 2025-04-11 DIAGNOSIS — E11.9 TYPE 2 DIABETES MELLITUS WITHOUT COMPLICATION, UNSPECIFIED WHETHER LONG TERM INSULIN USE: ICD-10-CM

## 2025-04-11 RX ORDER — DAPAGLIFLOZIN 10 MG/1
10 TABLET, FILM COATED ORAL DAILY
Qty: 90 TABLET | Refills: 1 | Status: SHIPPED | OUTPATIENT
Start: 2025-04-11

## 2025-04-11 RX ORDER — DAPAGLIFLOZIN 10 MG/1
10 TABLET, FILM COATED ORAL DAILY
Qty: 90 TABLET | Refills: 3 | Status: SHIPPED | OUTPATIENT
Start: 2025-04-11 | End: 2026-04-11

## 2025-04-11 NOTE — TELEPHONE ENCOUNTER
Fara FLORES Do [id number] [facility] Clinical Support Staff  Called pt CX appt 4-14 will keep appt in June and do labs  Also needs a refill on Farxiga she will be out 4/14, [facility] [place]

## 2025-04-14 ENCOUNTER — APPOINTMENT (OUTPATIENT)
Dept: PRIMARY CARE | Facility: CLINIC | Age: 45
End: 2025-04-14
Payer: COMMERCIAL

## 2025-04-30 DIAGNOSIS — E11.42 DIABETIC POLYNEUROPATHY ASSOCIATED WITH TYPE 2 DIABETES MELLITUS: ICD-10-CM

## 2025-04-30 DIAGNOSIS — E11.9 TYPE 2 DIABETES MELLITUS WITHOUT COMPLICATION, WITH LONG-TERM CURRENT USE OF INSULIN: Primary | ICD-10-CM

## 2025-04-30 DIAGNOSIS — Z79.4 TYPE 2 DIABETES MELLITUS WITHOUT COMPLICATION, WITH LONG-TERM CURRENT USE OF INSULIN: Primary | ICD-10-CM

## 2025-05-22 ENCOUNTER — OFFICE VISIT (OUTPATIENT)
Dept: PRIMARY CARE | Facility: CLINIC | Age: 45
End: 2025-05-22
Payer: COMMERCIAL

## 2025-05-22 VITALS
SYSTOLIC BLOOD PRESSURE: 120 MMHG | WEIGHT: 227.8 LBS | HEART RATE: 97 BPM | TEMPERATURE: 96 F | DIASTOLIC BLOOD PRESSURE: 70 MMHG | OXYGEN SATURATION: 94 % | BODY MASS INDEX: 36.77 KG/M2

## 2025-05-22 DIAGNOSIS — M79.674 PAIN AND SWELLING OF TOE, RIGHT: Primary | ICD-10-CM

## 2025-05-22 DIAGNOSIS — M79.89 PAIN AND SWELLING OF TOE, RIGHT: Primary | ICD-10-CM

## 2025-05-22 PROCEDURE — 3078F DIAST BP <80 MM HG: CPT

## 2025-05-22 PROCEDURE — 99213 OFFICE O/P EST LOW 20 MIN: CPT

## 2025-05-22 PROCEDURE — 4004F PT TOBACCO SCREEN RCVD TLK: CPT

## 2025-05-22 PROCEDURE — 3074F SYST BP LT 130 MM HG: CPT

## 2025-05-22 RX ORDER — DEXTROAMPHETAMINE SACCHARATE, AMPHETAMINE ASPARTATE, DEXTROAMPHETAMINE SULFATE AND AMPHETAMINE SULFATE 2.5; 2.5; 2.5; 2.5 MG/1; MG/1; MG/1; MG/1
1 TABLET ORAL
COMMUNITY
Start: 2025-04-23

## 2025-05-22 RX ORDER — PREDNISONE 20 MG/1
40 TABLET ORAL DAILY
Qty: 10 TABLET | Refills: 0 | Status: SHIPPED | OUTPATIENT
Start: 2025-05-22 | End: 2025-05-27

## 2025-05-22 RX ORDER — SULFAMETHOXAZOLE AND TRIMETHOPRIM 800; 160 MG/1; MG/1
1 TABLET ORAL 2 TIMES DAILY
Qty: 14 TABLET | Refills: 0 | Status: SHIPPED | OUTPATIENT
Start: 2025-05-22 | End: 2025-05-29

## 2025-05-22 ASSESSMENT — PAIN SCALES - GENERAL: PAINLEVEL_OUTOF10: 8

## 2025-05-22 NOTE — PROGRESS NOTES
Subjective   Patient ID: Alanna Hickman is a 44 y.o. female who presents for Toe Pain (Pt states right foot multiple toes are painful and red and purple in color and hot to the touch. Pt seen podiatrist on 5/19/2025 for diabetic polyneuropathy associated with type 2 diabetes mellitus and dermatophytosis of nail but pt states the toe's appearance and pain is still getting worse everyday.).    HPI   Alanna is seen for foot pain. Reports pain right pinky toe. Started 3 days ago. Achy pain, hurts to push on it. Worse when walking. Neuropathy in foot, chronic, hx diabetes. No known injury. Has tried NSAIDs with mild relief. No history of gout. Saw podiatry on 5/19, had nail trimmings at that time. Has gotten worse over the past few days. Denies fever, chills.     Review of Systems  All other systems have been reviewed and are negative except as noted in the HPI.     Objective   /70   Pulse 97   Temp 35.6 °C (96 °F) (Temporal)   Wt 103 kg (227 lb 12.8 oz)   SpO2 94%   BMI 36.77 kg/m²     Physical Exam  Vitals and nursing note reviewed.   Constitutional:       General: She is not in acute distress.     Appearance: Normal appearance.   Eyes:      Extraocular Movements: Extraocular movements intact.      Conjunctiva/sclera: Conjunctivae normal.   Cardiovascular:      Rate and Rhythm: Normal rate.      Pulses:           Dorsalis pedis pulses are 2+ on the right side and 2+ on the left side.   Pulmonary:      Effort: Pulmonary effort is normal.   Musculoskeletal:      Cervical back: Neck supple.      Right lower leg: No edema.      Left lower leg: No edema.   Feet:      Right foot:      Skin integrity: Skin integrity normal.      Toenail Condition: Right toenails are abnormally thick.      Left foot:      Skin integrity: Skin integrity normal.      Toenail Condition: Left toenails are abnormally thick.      Comments: Erythema, induration, warmth of right little toe.  No signs of skin breakdown or injury.  Cap  refill less than 2 seconds.  Sensation at baseline.  Neurological:      General: No focal deficit present.      Mental Status: She is alert.      Motor: No weakness.   Psychiatric:         Mood and Affect: Mood normal.         Assessment/Plan   Assessment & Plan  Pain and swelling of toe, right  Acute.  Differential diagnosis includes gout, cellulitis, less likely osteomyelitis.  Will cover for both gout and cellulitis.  Start prednisone and Bactrim as directed. Risks and benefits of medication discussed and prescribed.   OTC Tylenol as directed for pain.  We discussed importance of avoidance of NSAIDs as patient is currently on Plavix.  Supportive footwear, elevation of legs, cool compresses.  Keep feet clean and dry, continue to inspect for skin breakdown.  Check uric acid in about 2 weeks.  Recheck in 1 week or follow-up sooner if needed.  I also recommend close follow-up with podiatry.    Orders:    predniSONE (Deltasone) 20 mg tablet; Take 2 tablets (40 mg) by mouth once daily for 5 days.    Uric acid; Future    sulfamethoxazole-trimethoprim (Bactrim DS) 800-160 mg tablet; Take 1 tablet by mouth 2 times a day for 7 days. Take with food.

## 2025-05-29 ENCOUNTER — APPOINTMENT (OUTPATIENT)
Dept: PRIMARY CARE | Facility: CLINIC | Age: 45
End: 2025-05-29
Payer: COMMERCIAL

## 2025-06-01 DIAGNOSIS — E11.9 TYPE 2 DIABETES MELLITUS WITHOUT COMPLICATION, WITH LONG-TERM CURRENT USE OF INSULIN: ICD-10-CM

## 2025-06-01 DIAGNOSIS — Z79.4 TYPE 2 DIABETES MELLITUS WITHOUT COMPLICATION, WITH LONG-TERM CURRENT USE OF INSULIN: ICD-10-CM

## 2025-06-05 ENCOUNTER — APPOINTMENT (OUTPATIENT)
Dept: PRIMARY CARE | Facility: CLINIC | Age: 45
End: 2025-06-05
Payer: COMMERCIAL

## 2025-06-05 DIAGNOSIS — M79.674 PAIN AND SWELLING OF TOE, RIGHT: ICD-10-CM

## 2025-06-05 DIAGNOSIS — M79.89 PAIN AND SWELLING OF TOE, RIGHT: ICD-10-CM

## 2025-06-09 ENCOUNTER — APPOINTMENT (OUTPATIENT)
Dept: PRIMARY CARE | Facility: CLINIC | Age: 45
End: 2025-06-09
Payer: COMMERCIAL

## 2025-06-10 ENCOUNTER — APPOINTMENT (OUTPATIENT)
Dept: PRIMARY CARE | Facility: CLINIC | Age: 45
End: 2025-06-10
Payer: COMMERCIAL

## 2025-06-16 ENCOUNTER — HOSPITAL ENCOUNTER (OUTPATIENT)
Dept: RESPIRATORY THERAPY | Facility: CLINIC | Age: 45
Discharge: HOME | End: 2025-06-16
Payer: COMMERCIAL

## 2025-06-16 DIAGNOSIS — F17.210 CIGARETTE NICOTINE DEPENDENCE WITHOUT COMPLICATION: ICD-10-CM

## 2025-06-16 DIAGNOSIS — J96.01 ACUTE HYPOXEMIC RESPIRATORY FAILURE: ICD-10-CM

## 2025-06-16 DIAGNOSIS — J18.9 MULTIFOCAL PNEUMONIA: ICD-10-CM

## 2025-06-16 PROCEDURE — 94664 DEMO&/EVAL PT USE INHALER: CPT | Mod: 59

## 2025-06-16 PROCEDURE — 94060 EVALUATION OF WHEEZING: CPT

## 2025-06-16 PROCEDURE — 94729 DIFFUSING CAPACITY: CPT | Performed by: INTERNAL MEDICINE

## 2025-06-16 PROCEDURE — 94727 GAS DIL/WSHOT DETER LNG VOL: CPT

## 2025-06-16 PROCEDURE — 94760 N-INVAS EAR/PLS OXIMETRY 1: CPT

## 2025-06-16 PROCEDURE — 94060 EVALUATION OF WHEEZING: CPT | Performed by: INTERNAL MEDICINE

## 2025-06-16 PROCEDURE — 94727 GAS DIL/WSHOT DETER LNG VOL: CPT | Performed by: INTERNAL MEDICINE

## 2025-06-16 PROCEDURE — 95012 NITRIC OXIDE EXP GAS DETER: CPT

## 2025-06-16 PROCEDURE — 94729 DIFFUSING CAPACITY: CPT

## 2025-06-16 PROCEDURE — 9420000001 HC RT PATIENT EDUCATION 5 MIN

## 2025-06-16 ASSESSMENT — ENCOUNTER SYMPTOMS
HUNGER: 0
CONFUSION: 1
SWEATS: 1
SEIZURES: 1
HEADACHES: 1
BLURRED VISION: 0
POLYDIPSIA: 1
FATIGUE: 1
WEAKNESS: 1
DIZZINESS: 1
BLACKOUTS: 0
TREMORS: 1
VISUAL CHANGE: 0
WEIGHT LOSS: 0
NERVOUS/ANXIOUS: 1
SPEECH DIFFICULTY: 1

## 2025-06-17 ENCOUNTER — HOSPITAL ENCOUNTER (OUTPATIENT)
Dept: RADIOLOGY | Facility: CLINIC | Age: 45
Discharge: HOME | End: 2025-06-17
Payer: COMMERCIAL

## 2025-06-17 DIAGNOSIS — J15.7 PNEUMONIA OF BOTH UPPER LOBES DUE TO MYCOPLASMA PNEUMONIAE: ICD-10-CM

## 2025-06-17 LAB — URATE SERPL-MCNC: 7.1 MG/DL (ref 2.5–7)

## 2025-06-17 PROCEDURE — 71046 X-RAY EXAM CHEST 2 VIEWS: CPT

## 2025-06-18 ENCOUNTER — APPOINTMENT (OUTPATIENT)
Dept: PRIMARY CARE | Facility: CLINIC | Age: 45
End: 2025-06-18
Payer: COMMERCIAL

## 2025-06-18 ENCOUNTER — TELEPHONE (OUTPATIENT)
Dept: PRIMARY CARE | Facility: CLINIC | Age: 45
End: 2025-06-18

## 2025-06-18 VITALS
HEART RATE: 88 BPM | SYSTOLIC BLOOD PRESSURE: 122 MMHG | DIASTOLIC BLOOD PRESSURE: 74 MMHG | WEIGHT: 225 LBS | BODY MASS INDEX: 36.32 KG/M2 | OXYGEN SATURATION: 98 %

## 2025-06-18 DIAGNOSIS — E11.42 TYPE 2 DIABETES MELLITUS WITH DIABETIC POLYNEUROPATHY, WITH LONG-TERM CURRENT USE OF INSULIN: ICD-10-CM

## 2025-06-18 DIAGNOSIS — Z79.4 TYPE 2 DIABETES MELLITUS WITH DIABETIC POLYNEUROPATHY, WITH LONG-TERM CURRENT USE OF INSULIN: ICD-10-CM

## 2025-06-18 DIAGNOSIS — J45.20 MILD INTERMITTENT ASTHMA WITHOUT COMPLICATION (HHS-HCC): ICD-10-CM

## 2025-06-18 DIAGNOSIS — M10.09 ACUTE IDIOPATHIC GOUT OF MULTIPLE SITES: ICD-10-CM

## 2025-06-18 DIAGNOSIS — E11.9 TYPE 2 DIABETES MELLITUS WITHOUT COMPLICATION, WITH LONG-TERM CURRENT USE OF INSULIN: ICD-10-CM

## 2025-06-18 DIAGNOSIS — Z79.4 TYPE 2 DIABETES MELLITUS WITHOUT COMPLICATION, WITH LONG-TERM CURRENT USE OF INSULIN: ICD-10-CM

## 2025-06-18 DIAGNOSIS — B37.2 YEAST DERMATITIS: Primary | ICD-10-CM

## 2025-06-18 PROBLEM — F90.0 ATTENTION DEFICIT HYPERACTIVITY DISORDER (ADHD), PREDOMINANTLY INATTENTIVE TYPE: Status: ACTIVE | Noted: 2025-06-18

## 2025-06-18 LAB
ALBUMIN SERPL-MCNC: 4.5 G/DL (ref 3.6–5.1)
ALP SERPL-CCNC: 77 U/L (ref 31–125)
ALT SERPL-CCNC: 38 U/L (ref 6–29)
ANION GAP SERPL CALCULATED.4IONS-SCNC: 16 MMOL/L (CALC) (ref 7–17)
AST SERPL-CCNC: 30 U/L (ref 10–35)
BILIRUB SERPL-MCNC: 0.7 MG/DL (ref 0.2–1.2)
BUN SERPL-MCNC: 11 MG/DL (ref 7–25)
CALCIUM SERPL-MCNC: 9.9 MG/DL (ref 8.6–10.2)
CHLORIDE SERPL-SCNC: 93 MMOL/L (ref 98–110)
CO2 SERPL-SCNC: 26 MMOL/L (ref 20–32)
CREAT SERPL-MCNC: 0.54 MG/DL (ref 0.5–0.99)
EGFRCR SERPLBLD CKD-EPI 2021: 115 ML/MIN/1.73M2
EST. AVERAGE GLUCOSE BLD GHB EST-MCNC: 180 MG/DL
EST. AVERAGE GLUCOSE BLD GHB EST-SCNC: 10 MMOL/L
GLUCOSE SERPL-MCNC: 175 MG/DL (ref 65–99)
HBA1C MFR BLD: 7.9 %
POC APPEARANCE, URINE: CLEAR
POC BILIRUBIN, URINE: ABNORMAL
POC BLOOD, URINE: NEGATIVE
POC COLOR, URINE: YELLOW
POC GLUCOSE, URINE: ABNORMAL MG/DL
POC KETONES, URINE: ABNORMAL MG/DL
POC LEUKOCYTES, URINE: NEGATIVE
POC NITRITE,URINE: NEGATIVE
POC PH, URINE: 5.5 PH
POC PROTEIN, URINE: ABNORMAL MG/DL
POC SPECIFIC GRAVITY, URINE: 1.02
POC UROBILINOGEN, URINE: 0.2 EU/DL
POTASSIUM SERPL-SCNC: 4.4 MMOL/L (ref 3.5–5.3)
PROT SERPL-MCNC: 7.3 G/DL (ref 6.1–8.1)
SODIUM SERPL-SCNC: 135 MMOL/L (ref 135–146)

## 2025-06-18 PROCEDURE — 3078F DIAST BP <80 MM HG: CPT | Performed by: PHYSICIAN ASSISTANT

## 2025-06-18 PROCEDURE — 99213 OFFICE O/P EST LOW 20 MIN: CPT | Performed by: PHYSICIAN ASSISTANT

## 2025-06-18 PROCEDURE — 3074F SYST BP LT 130 MM HG: CPT | Performed by: PHYSICIAN ASSISTANT

## 2025-06-18 PROCEDURE — 81003 URINALYSIS AUTO W/O SCOPE: CPT | Performed by: PHYSICIAN ASSISTANT

## 2025-06-18 RX ORDER — LORAZEPAM 0.5 MG/1
1 TABLET ORAL NIGHTLY PRN
COMMUNITY
Start: 2025-05-21 | End: 2025-06-27 | Stop reason: DRUGHIGH

## 2025-06-18 RX ORDER — ALBUTEROL SULFATE 0.83 MG/ML
2.5 SOLUTION RESPIRATORY (INHALATION) 4 TIMES DAILY PRN
Qty: 180 ML | Refills: 1 | Status: SHIPPED | OUTPATIENT
Start: 2025-06-18

## 2025-06-18 RX ORDER — METFORMIN HYDROCHLORIDE 500 MG/1
1500 TABLET, EXTENDED RELEASE ORAL DAILY
Qty: 270 TABLET | Refills: 1 | Status: SHIPPED | OUTPATIENT
Start: 2025-06-18

## 2025-06-18 RX ORDER — PEN NEEDLE, DIABETIC 30 GX3/16"
NEEDLE, DISPOSABLE MISCELLANEOUS
Qty: 100 EACH | Refills: 3 | Status: SHIPPED | OUTPATIENT
Start: 2025-06-18

## 2025-06-18 RX ORDER — ALLOPURINOL 300 MG/1
300 TABLET ORAL DAILY
Qty: 30 TABLET | Refills: 2 | Status: SHIPPED | OUTPATIENT
Start: 2025-06-18

## 2025-06-18 RX ORDER — NYSTATIN AND TRIAMCINOLONE ACETONIDE 100000; 1 [USP'U]/G; MG/G
CREAM TOPICAL 2 TIMES DAILY
Qty: 60 G | Refills: 1 | Status: SHIPPED | OUTPATIENT
Start: 2025-06-18

## 2025-06-18 ASSESSMENT — ENCOUNTER SYMPTOMS
FATIGUE: 1
TREMORS: 1
SWEATS: 1
WEIGHT LOSS: 0
BLACKOUTS: 0
HUNGER: 0
SEIZURES: 1
POLYDIPSIA: 1
SPEECH DIFFICULTY: 1
NERVOUS/ANXIOUS: 1
HEADACHES: 1
DIZZINESS: 1
CONFUSION: 1
VISUAL CHANGE: 0
WEAKNESS: 1
BLURRED VISION: 0

## 2025-06-18 ASSESSMENT — PAIN SCALES - GENERAL: PAINLEVEL_OUTOF10: 0-NO PAIN

## 2025-06-18 NOTE — PROGRESS NOTES
Subjective   Patient ID: Alanna Hickman is a 44 y.o. female who presents for Diabetes.    Diabetes  She presents for her follow-up diabetic visit. She has type 2 diabetes mellitus. No MedicAlert identification noted. The initial diagnosis of diabetes was made 9 years ago. Hypoglycemia symptoms include confusion, dizziness, headaches, nervousness/anxiousness, seizures, sleepiness, speech difficulty, sweats and tremors. Pertinent negatives for hypoglycemia include no hunger, mood changes or pallor. Associated symptoms include fatigue, foot paresthesias, polydipsia, polyuria and weakness. Pertinent negatives for diabetes include no blurred vision, no chest pain, no foot ulcerations, no visual change and no weight loss. Hypoglycemia complications include required assistance. Pertinent negatives for hypoglycemia complications include no blackouts, no hospitalization, no nocturnal hypoglycemia and no required glucagon injection. Symptoms are worsening. Diabetic complications include a CVA, peripheral neuropathy and PVD. Pertinent negatives for diabetic complications include no heart disease, impotence, nephropathy or retinopathy. Risk factors for coronary artery disease include obesity, post-menopausal, sedentary lifestyle, stress and tobacco exposure. Current diabetic treatment includes insulin injections and oral agent (triple therapy). She is compliant with treatment all of the time. She is currently taking insulin at bedtime. Insulin injections are given by patient. Rotation sites for injection include the abdominal wall. Her weight is stable. She is following a generally unhealthy diet. Meal planning includes avoidance of concentrated sweets. She has not had a previous visit with a dietitian. She never participates in exercise. She monitors blood glucose at home 5+ x per day. She monitors urine at home <1 x per month. Blood glucose monitoring compliance is excellent. Her home blood glucose trend is fluctuating  dramatically. Her breakfast blood glucose is taken after 10 am. Her breakfast blood glucose range is generally >200 mg/dl. Her lunch blood glucose is taken between 12-1 pm. Her lunch blood glucose range is generally 140-180 mg/dl. Her dinner blood glucose is taken between 7-8 pm. Her dinner blood glucose range is generally 130-140 mg/dl. Her bedtime blood glucose is taken between 10-11 pm. Her bedtime blood glucose range is generally 130-140 mg/dl. Her overall blood glucose range is 130-140 mg/dl. She sees a podiatrist.Eye exam is current.        Review of Systems   Constitutional:  Positive for fatigue. Negative for weight loss.   Eyes:  Negative for blurred vision.   Cardiovascular:  Negative for chest pain.   Endocrine: Positive for polydipsia and polyuria.   Genitourinary:  Negative for impotence.   Skin:  Negative for pallor.   Neurological:  Positive for dizziness, tremors, seizures, speech difficulty, weakness and headaches.   Psychiatric/Behavioral:  Positive for confusion. The patient is nervous/anxious.    Was taking taking Humalog prn, not on a regular basis.    Objective   /74   Pulse 88   Wt 102 kg (225 lb)   LMP 04/01/2024   SpO2 98%   BMI 36.32 kg/m²     Physical Exam  Cardiovascular:      Rate and Rhythm: Normal rate and regular rhythm.      Pulses: Normal pulses.   Pulmonary:      Effort: Pulmonary effort is normal.   Musculoskeletal:      Right lower leg: No edema.      Left lower leg: No edema.   Feet:      Right foot:      Protective Sensation: 4 sites tested.  2 sites sensed.      Skin integrity: Skin integrity normal. No skin breakdown.      Left foot:      Protective Sensation: 4 sites tested.  2 sites sensed.      Skin integrity: Skin integrity normal. No skin breakdown.   Neurological:      General: No focal deficit present.      Mental Status: She is alert. Mental status is at baseline.         Assessment/Plan   Diagnoses and all orders for this visit:  Yeast dermatitis  -      "nystatin-triamcinolone (Mycolog II) cream; Apply topically 2 times a day. Apply to affect area twice a day for 7-14 days then as needed for rash.  Type 2 diabetes mellitus without complication, with long-term current use of insulin  -     POCT UA Automated manually resulted  -     Albumin-Creatinine Ratio, Urine Random  -     pen needle, diabetic (Sure Comfort Pen Needle) 31 gauge x 3/16\" needle; Use once daily as directed  Type 2 diabetes mellitus with diabetic polyneuropathy, with long-term current use of insulin  -     metFORMIN  mg 24 hr tablet; Take 3 tablets (1,500 mg) by mouth once daily.  Mild intermittent asthma without complication ([facility])  -     albuterol 2.5 mg /3 mL (0.083 %) nebulizer solution; Take 3 mL (2.5 mg) by nebulization 4 times a day as needed for wheezing or shortness of breath.  Acute idiopathic gout of multiple sites  -     allopurinol (Zyloprim) 300 mg tablet; Take 1 tablet (300 mg) by mouth once daily.  Other orders  -     Follow Up In Primary Care - Established  -     Follow Up In Primary Care - Established  -     Follow Up In Primary Care - Established; Future  -     Follow Up In Primary Care - Established; Future  Will decrease her humalog to 4 units but with each meal.  Recheck in 3 months.       "

## 2025-06-19 ENCOUNTER — APPOINTMENT (OUTPATIENT)
Dept: PULMONOLOGY | Facility: CLINIC | Age: 45
End: 2025-06-19
Payer: COMMERCIAL

## 2025-06-19 LAB
ALBUMIN/CREAT UR: 31 MG/G CREAT
CREAT UR-MCNC: 89 MG/DL (ref 20–275)
MICROALBUMIN UR-MCNC: 2.8 MG/DL

## 2025-06-26 ENCOUNTER — PATIENT MESSAGE (OUTPATIENT)
Dept: PRIMARY CARE | Facility: CLINIC | Age: 45
End: 2025-06-26
Payer: COMMERCIAL

## 2025-06-27 ENCOUNTER — OFFICE VISIT (OUTPATIENT)
Dept: PRIMARY CARE | Facility: CLINIC | Age: 45
End: 2025-06-27
Payer: COMMERCIAL

## 2025-06-27 ENCOUNTER — PATIENT MESSAGE (OUTPATIENT)
Dept: PRIMARY CARE | Facility: CLINIC | Age: 45
End: 2025-06-27

## 2025-06-27 VITALS
DIASTOLIC BLOOD PRESSURE: 88 MMHG | SYSTOLIC BLOOD PRESSURE: 132 MMHG | WEIGHT: 225 LBS | BODY MASS INDEX: 36.32 KG/M2 | OXYGEN SATURATION: 95 % | TEMPERATURE: 97.4 F | HEART RATE: 91 BPM

## 2025-06-27 DIAGNOSIS — M45.0 ANKYLOSING SPONDYLITIS OF MULTIPLE SITES IN SPINE (MULTI): Primary | ICD-10-CM

## 2025-06-27 PROCEDURE — 3079F DIAST BP 80-89 MM HG: CPT | Performed by: PHYSICIAN ASSISTANT

## 2025-06-27 PROCEDURE — 3075F SYST BP GE 130 - 139MM HG: CPT | Performed by: PHYSICIAN ASSISTANT

## 2025-06-27 PROCEDURE — 99213 OFFICE O/P EST LOW 20 MIN: CPT | Performed by: PHYSICIAN ASSISTANT

## 2025-06-27 PROCEDURE — 4004F PT TOBACCO SCREEN RCVD TLK: CPT | Performed by: PHYSICIAN ASSISTANT

## 2025-06-27 RX ORDER — LORAZEPAM 1 MG/1
1 TABLET ORAL NIGHTLY PRN
COMMUNITY
Start: 2025-06-24

## 2025-06-27 RX ORDER — PREDNISONE 5 MG/1
TABLET ORAL
Qty: 30 TABLET | Refills: 0 | Status: SHIPPED | OUTPATIENT
Start: 2025-06-27

## 2025-06-27 ASSESSMENT — ENCOUNTER SYMPTOMS
ARTHRALGIAS: 1
DIZZINESS: 0
MYALGIAS: 1
LIGHT-HEADEDNESS: 0
NUMBNESS: 1
JOINT SWELLING: 1
BACK PAIN: 1

## 2025-06-27 ASSESSMENT — PAIN SCALES - GENERAL: PAINLEVEL_OUTOF10: 10-WORST PAIN EVER

## 2025-06-27 NOTE — PROGRESS NOTES
Subjective   Patient ID: Alanna Hickman is a 44 y.o. female who presents for Joint Swelling (Having a flare up of her arthritis in hands, left ring finger, and left knee. Unable to see rheumatologist until August. Taking tylenol and ibuprofen.).    HPI   Is here for hands and knees swelling has had flareups in the past of her ankylosing spondylitis.  She does not have an appointment with rheumatology until August.  Found out her nephew has HLA B27 also.   She felt better on prednisone when she on it recently.  Will try to call Dr. Ramos today regarding treatment for her back.  Review of Systems   Musculoskeletal:  Positive for arthralgias, back pain, joint swelling and myalgias.   Neurological:  Positive for numbness. Negative for dizziness and light-headedness.       Objective   /88   Pulse 91   Temp 36.3 °C (97.4 °F)   Wt 102 kg (225 lb)   LMP 04/01/2024   SpO2 95%   BMI 36.32 kg/m²     Physical Exam  Musculoskeletal:      Left hand: Swelling, deformity and tenderness present. Decreased range of motion.      Comments: He has more swelling in her left hand especially her fourth finger.  4-year-old whole finger that swollen.  But there is no signs of erythema infection pulses intact.   Neurological:      General: No focal deficit present.      Mental Status: She is alert and oriented to person, place, and time. Mental status is at baseline.   Psychiatric:         Mood and Affect: Mood normal.         Behavior: Behavior normal.         Thought Content: Thought content normal.         Judgment: Judgment normal.         Assessment/Plan   Diagnoses and all orders for this visit:  Ankylosing spondylitis of multiple sites in spine ([facility])  -     predniSONE (Deltasone) 5 mg tablet; Take 5 tabs (25 mg) by mouth daily for 2 days, then 4 tabs (20 mg) daily for 2 days, then 3 tabs (15 mg) daily for 2 days, then 2 tabs (10 mg) daily for 2 days, then 1 tab (5 mg) daily for 2 days.  unfortunately prednisones one of  the few things that helps her.  She is not currently on any pain medication from her rheumatologist.  Patient is aware of how it affects her blood sugars.  She has an appointment soon for diabetic check as well.

## 2025-07-07 DIAGNOSIS — K21.9 GASTROESOPHAGEAL REFLUX DISEASE WITHOUT ESOPHAGITIS: ICD-10-CM

## 2025-07-07 RX ORDER — PANTOPRAZOLE SODIUM 20 MG/1
TABLET, DELAYED RELEASE ORAL
Qty: 90 TABLET | Refills: 0 | Status: SHIPPED | OUTPATIENT
Start: 2025-07-07

## 2025-07-08 ENCOUNTER — TELEPHONE (OUTPATIENT)
Dept: PRIMARY CARE | Facility: CLINIC | Age: 45
End: 2025-07-08
Payer: COMMERCIAL

## 2025-07-08 DIAGNOSIS — E11.40 TYPE 2 DIABETES MELLITUS WITH DIABETIC NEUROPATHY, WITH LONG-TERM CURRENT USE OF INSULIN: ICD-10-CM

## 2025-07-08 DIAGNOSIS — Z79.4 TYPE 2 DIABETES MELLITUS WITH DIABETIC NEUROPATHY, WITH LONG-TERM CURRENT USE OF INSULIN: ICD-10-CM

## 2025-07-08 DIAGNOSIS — Z79.4 TYPE 2 DIABETES MELLITUS WITHOUT COMPLICATION, WITH LONG-TERM CURRENT USE OF INSULIN: Primary | ICD-10-CM

## 2025-07-08 DIAGNOSIS — E11.9 TYPE 2 DIABETES MELLITUS WITHOUT COMPLICATION, WITH LONG-TERM CURRENT USE OF INSULIN: Primary | ICD-10-CM

## 2025-07-08 RX ORDER — INSULIN DEGLUDEC 100 U/ML
40 INJECTION, SOLUTION SUBCUTANEOUS NIGHTLY
Qty: 18 ML | Refills: 3 | Status: SHIPPED | OUTPATIENT
Start: 2025-07-08

## 2025-07-08 RX ORDER — DEXTROSE 4 G
TABLET,CHEWABLE ORAL
Qty: 1 EACH | Refills: 0 | Status: SHIPPED | OUTPATIENT
Start: 2025-07-08

## 2025-07-08 RX ORDER — LANCETS 33 GAUGE
EACH MISCELLANEOUS
Qty: 100 EACH | Refills: 1 | Status: SHIPPED | OUTPATIENT
Start: 2025-07-08

## 2025-07-08 RX ORDER — BLOOD-GLUCOSE METER
EACH MISCELLANEOUS
Qty: 100 STRIP | Refills: 11 | Status: SHIPPED | OUTPATIENT
Start: 2025-07-08

## 2025-07-08 NOTE — TELEPHONE ENCOUNTER
Please send the insulin degludec (Tresiba FlexTouch U) to [facility] in [place] because the [facility] one is out.

## 2025-07-08 NOTE — TELEPHONE ENCOUNTER
Patient's sensor keep breaking for the Dexcom. So she needs a refill on these. Can she just get a hand held meter with the finger prick so she won't has this problem anymore? Send to [facility] in [place]. Last seen on 03/17/2025.

## 2025-07-11 ENCOUNTER — APPOINTMENT (OUTPATIENT)
Dept: PRIMARY CARE | Facility: CLINIC | Age: 45
End: 2025-07-11
Payer: COMMERCIAL

## 2025-07-11 DIAGNOSIS — E11.9 TYPE 2 DIABETES MELLITUS WITHOUT COMPLICATION, WITHOUT LONG-TERM CURRENT USE OF INSULIN: Primary | ICD-10-CM

## 2025-07-11 RX ORDER — METFORMIN HYDROCHLORIDE 500 MG/1
1000 TABLET, EXTENDED RELEASE ORAL
Qty: 120 TABLET | Refills: 3 | Status: SHIPPED | OUTPATIENT
Start: 2025-07-11 | End: 2026-07-11

## 2025-07-11 RX ORDER — INSULIN DEGLUDEC 100 U/ML
INJECTION, SOLUTION SUBCUTANEOUS
Qty: 15 ML | Refills: 11 | Status: SHIPPED | OUTPATIENT
Start: 2025-07-11

## 2025-07-11 NOTE — PROGRESS NOTES
AGP Report review   E11.9 - Type 2 diabetes    Alanna Hickman is a 44 y.o. female who was referred by Ansley Shetty PA-C to review current CGM data.     Pt here to follow up re: Diabetes.  She is uncertain what she should be eating and is frustrated that her sugars continue to increase.  Taking Metformin XR 500mg 3 daily, Farxiga 10mg daily, Tresiba 40 units daily, Humalog 8 units before meals     Medication history:   Previous pancreatitis, GLP-1 not advisable   CROWE - Risk of hypoglycemia does not outweigh benefit    Previous intolerance to DPP4  Taking SGLT2/Metformin/Basal and Bolus insulin      Patient is using CGM, Dexcom.  Report reviewed with patient.  See attached documents.      Current DM Meds:  Current Outpatient Medications   Medication Instructions    albuterol (Ventolin HFA) 90 mcg/actuation inhaler 2 puffs, inhalation, Every 6 hours PRN    albuterol 2.5 mg, nebulization, 4 times daily PRN    allopurinol (ZYLOPRIM) 300 mg, oral, Daily    amphetamine-dextroamphetamine (Adderall) 10 mg tablet 1 tablet, Daily before breakfast    BABY ASPIRIN ORAL Take by mouth.    blood sugar diagnostic (OneTouch Verio test strips) Use to check blood sugar up to 4 times daily    blood-glucose meter misc Use to check blood sugar once daily DX: E11.9    blood-glucose sensor (Dexcom G7 Sensor) device Change sensor every 10 days as directed    clopidogrel (Plavix) 75 mg tablet 1 tablet, Daily    dapagliflozin propanediol (FARXIGA) 10 mg, oral, Daily    Dexcom G7  misc Use as instructed    Farxiga 10 mg, oral, Daily    fluticasone propion-salmeteroL (Advair HFA) 45-21 mcg/actuation inhaler 2 puffs, inhalation, 2 times daily RT, Rinse mouth with water after use to reduce aftertaste and incidence of candidiasis. Do not swallow.    inhalational spacing device inhaler Use as directed with inhalers    insulin degludec (TRESIBA FLEXTOUCH U-100) 40 Units, subcutaneous, Nightly, Take as directed per insulin  "instructions.    insulin lispro (HUMALOG) 8 Units, subcutaneous, 3 times daily (morning, midday, late afternoon), Take as directed per insulin instructions    ipratropium-albuteroL (Duo-Neb) 0.5-2.5 mg/3 mL nebulizer solution Inhale the contents of 1 vial (3 ml) by nebulization 4 times a day as needed for wheezing or shortness of breath.    lancets (OneTouch Delica Plus Lancet) 33 gauge misc As directed to check blood sugar daily    LORazepam (ATIVAN) 1 mg, Nightly PRN    metFORMIN XR (GLUCOPHAGE-XR) 1,500 mg, oral, Daily    nebulizer and compressor (Comp-Air Nebulizer Compressor) device Use with nebulizer solution as directed    nicotine (Nicoderm CQ) 21 mg/24 hr patch APPLY ONE PATCH EXTERNALLY once EVERY 24 hours for 21 days    nystatin-triamcinolone (Mycolog II) cream Topical, 2 times daily, Apply to affect area twice a day for 7-14 days then as needed for rash.    pantoprazole (ProtoNix) 20 mg EC tablet TAKE ONE TABLET BY MOUTH once daily IN THE MORNING. take before meals. do not crush, chew, or split.    pen needle, diabetic (Sure Comfort Pen Needle) 31 gauge x 3/16\" needle Use once daily as directed    predniSONE (Deltasone) 5 mg tablet Take 5 tabs (25 mg) by mouth daily for 2 days, then 4 tabs (20 mg) daily for 2 days, then 3 tabs (15 mg) daily for 2 days, then 2 tabs (10 mg) daily for 2 days, then 1 tab (5 mg) daily for 2 days.       POC HEMOGLOBIN A1c (%)   Date Value   09/05/2024 7.2 (A)   05/23/2024 8.8 (A)   02/15/2024 8.7 (A)     HEMOGLOBIN A1c (%)   Date Value   06/17/2025 7.9 (H)     Hemoglobin A1C (%)   Date Value   12/13/2024 7.8 (H)     GLUCOSE (mg/dL)   Date Value   06/17/2025 175 (H)     CREATININE (mg/dL)   Date Value   06/17/2025 0.54     EGFR (mL/min/1.73m2)   Date Value   06/17/2025 115       Summary of CGM Findings:  -----------------------------  Dexcom Clarity  -----------------------------  Alanna Hickman    YOB: 1980    Generated at: Fri, Jul 11, 2025 11:30 AM EDT    " Reporting period: Sat Jun 28, 2025 - Fri Jul 11, 2025  -----------------------------  Glucose Details    Average glucose: 174 mg/dL    GMI: 7.5%    Standard deviation: 53 mg/dL    Coefficient of Variation: 30.4%  -----------------------------  Time in Range    Very High: 9%    High: 31%    In Range: 59%    Low: 1%    Very Low: <1%    Target Range   mg/dL    -----------------------------  Sensor usage    Days with data: 13/14    Time active: 90%    Avg. calibrations per day: 0.0    See attached documents    Plan:  1.  Continue current dose Tresiba, Metformin, Farxiga  2.  If not eating breakfast, give 2 units of Humalog with morning coffee.  If eating breakfast then give 8 units Humalog.  Give 8 units Humalog with lunch and dinner  3.  Pt states that she keeps running out of her Tresiba and that pharmacy does not keep medication in stock.  Sent a one month supply using an emergency supply voucher from [employer] to [facility] for patient.  Instructed patient to fill insulin monthly even if she still has some left in her fridge so that she does not run out.    4.  Provided sample glucometer, strips, lancets as pt states she is unable to obtain from pharmacy.  Pt instructed to ask for sample test strips if she runs out and cannot get these from pharmacy.    5.  Follow up in 1 month with clinical pharmacist      Data reviewed and evaluated by GENEVA Champagne RP, [facility]  Recommendations/report sent to Ansley Shetty PA-C via shared medical record

## 2025-07-17 NOTE — PROGRESS NOTES
"Email from the patient:\"  [email or web address] [email or web address] <[email or web address]>  Today, 11:06 AM  Shari Cheek  Notice - This message is from an external sender  This message came from outside of . Careful opening links or attachments.  Report Suspicious     I'll try but for what. So people can call me as a \"client\" and not care like what kept happening when I was trying to get some couseling for the major depression I am coping with which I cope with on my own and Im not suicidal. I dont have that in me despite what i have to deal with physically. All I am going to deal with is getting cancellations over and over and people dealing with their own real life issues and that is fine but in the meanwhile I would like some simple basic care from a doctor to figure out why my left ankle keeps swelling or my left wrist. Why my lower back keeps hurting so badly. I had an OBGYN appointment last week and she figured out I have a very badly tilted uterus. Most likely why the incontinence medication Oxybutin did nothing. I have to get an ultrasound for that on april 11th.  Might be what is contributing to my terrible lower back pain along with my arthritis. In the meanwhile im just coping year in and year out with almost no help. ANd telling me I need a mental health crisis isnt helping at all I am coping as best i can.\"    My response: \"Ok. I have to say..I care. Some of us really care.  So, sometimes you have to find the right counselor.  I will let the RN know your medical concerns.\"    Shari Cheek MSW LSW   3/8/24   " no

## 2025-07-22 ENCOUNTER — PATIENT MESSAGE (OUTPATIENT)
Dept: PRIMARY CARE | Facility: CLINIC | Age: 45
End: 2025-07-22
Payer: COMMERCIAL

## 2025-07-29 ENCOUNTER — APPOINTMENT (OUTPATIENT)
Dept: PRIMARY CARE | Facility: CLINIC | Age: 45
End: 2025-07-29
Payer: COMMERCIAL

## 2025-07-29 VITALS
HEART RATE: 96 BPM | TEMPERATURE: 98.2 F | WEIGHT: 226 LBS | BODY MASS INDEX: 36.32 KG/M2 | HEIGHT: 66 IN | OXYGEN SATURATION: 94 % | SYSTOLIC BLOOD PRESSURE: 132 MMHG | DIASTOLIC BLOOD PRESSURE: 87 MMHG

## 2025-07-29 DIAGNOSIS — E11.9 TYPE 2 DIABETES MELLITUS WITHOUT COMPLICATION, WITH LONG-TERM CURRENT USE OF INSULIN: Primary | ICD-10-CM

## 2025-07-29 DIAGNOSIS — Z79.4 TYPE 2 DIABETES MELLITUS WITHOUT COMPLICATION, WITH LONG-TERM CURRENT USE OF INSULIN: Primary | ICD-10-CM

## 2025-07-29 DIAGNOSIS — F33.2 MDD (MAJOR DEPRESSIVE DISORDER), RECURRENT SEVERE, WITHOUT PSYCHOSIS (MULTI): ICD-10-CM

## 2025-07-29 DIAGNOSIS — J44.0 CHRONIC OBSTRUCTIVE PULMONARY DISEASE WITH (ACUTE) LOWER RESPIRATORY INFECTION: ICD-10-CM

## 2025-07-29 DIAGNOSIS — D68.8 HEREDITARY THROMBOPHILIA (MULTI): ICD-10-CM

## 2025-07-29 DIAGNOSIS — D68.59 HYPERCOAGULATION SYNDROME (MULTI): ICD-10-CM

## 2025-07-29 PROCEDURE — 3008F BODY MASS INDEX DOCD: CPT | Performed by: PHYSICIAN ASSISTANT

## 2025-07-29 PROCEDURE — 3079F DIAST BP 80-89 MM HG: CPT | Performed by: PHYSICIAN ASSISTANT

## 2025-07-29 PROCEDURE — 3075F SYST BP GE 130 - 139MM HG: CPT | Performed by: PHYSICIAN ASSISTANT

## 2025-07-29 PROCEDURE — 99212 OFFICE O/P EST SF 10 MIN: CPT | Performed by: PHYSICIAN ASSISTANT

## 2025-07-29 RX ORDER — GABAPENTIN 300 MG/1
300-600 CAPSULE ORAL NIGHTLY
COMMUNITY
Start: 2025-06-30

## 2025-07-29 ASSESSMENT — PATIENT HEALTH QUESTIONNAIRE - PHQ9
SUM OF ALL RESPONSES TO PHQ9 QUESTIONS 1 AND 2: 0
2. FEELING DOWN, DEPRESSED OR HOPELESS: NOT AT ALL
1. LITTLE INTEREST OR PLEASURE IN DOING THINGS: NOT AT ALL

## 2025-07-29 ASSESSMENT — COLUMBIA-SUICIDE SEVERITY RATING SCALE - C-SSRS
6. HAVE YOU EVER DONE ANYTHING, STARTED TO DO ANYTHING, OR PREPARED TO DO ANYTHING TO END YOUR LIFE?: NO
2. HAVE YOU ACTUALLY HAD ANY THOUGHTS OF KILLING YOURSELF?: NO
1. IN THE PAST MONTH, HAVE YOU WISHED YOU WERE DEAD OR WISHED YOU COULD GO TO SLEEP AND NOT WAKE UP?: NO

## 2025-07-29 NOTE — PROGRESS NOTES
"Subjective   Patient ID: Alanna Hickman is a 44 y.o. female who presents for Results (Pt is here to discuss blood work results and CT ).    HPI   Had seen pulmonology and recently had a PFT done. Is trying to get another CT scan of her chest. This stems from the last time she was in the hospital. Her pulmonologist ordered it.  Review of Systems   Constitutional:  Positive for fatigue.   Respiratory:  Positive for cough. Negative for chest tightness, shortness of breath and wheezing.    Cardiovascular:  Negative for chest pain.   Psychiatric/Behavioral:  Positive for dysphoric mood and sleep disturbance. The patient is nervous/anxious.        Objective   /87 (BP Location: Right arm, Patient Position: Sitting, BP Cuff Size: Adult)   Pulse 96   Temp 36.8 °C (98.2 °F) (Temporal)   Ht 1.676 m (5' 6\")   Wt 103 kg (226 lb)   SpO2 94%   BMI 36.48 kg/m²     Physical Exam    Neurological:      General: No focal deficit present.      Mental Status: She is alert and oriented to person, place, and time. Mental status is at baseline.     Psychiatric:         Mood and Affect: Mood normal.         Behavior: Behavior normal.         Thought Content: Thought content normal.         Judgment: Judgment normal.         Assessment/Plan   Diagnoses and all orders for this visit:  Type 2 diabetes mellitus without complication, with long-term current use of insulin  -     Referral to Nutrition Services; Future  Other orders  -     Follow Up In Primary Care - Established; Future  Pt will see pulmonology soon. She is also frustrated about her diet and monitoring sugars.. Will refer to nutrition.       "

## 2025-07-30 ENCOUNTER — TELEPHONE (OUTPATIENT)
Dept: PRIMARY CARE | Facility: CLINIC | Age: 45
End: 2025-07-30
Payer: COMMERCIAL

## 2025-08-03 PROBLEM — J44.0 CHRONIC OBSTRUCTIVE PULMONARY DISEASE WITH (ACUTE) LOWER RESPIRATORY INFECTION: Status: ACTIVE | Noted: 2025-08-03

## 2025-08-03 ASSESSMENT — ENCOUNTER SYMPTOMS
SLEEP DISTURBANCE: 1
FATIGUE: 1
NERVOUS/ANXIOUS: 1
DYSPHORIC MOOD: 1
WHEEZING: 0
COUGH: 1
SHORTNESS OF BREATH: 0
CHEST TIGHTNESS: 0

## 2025-08-04 DIAGNOSIS — E11.9 TYPE 2 DIABETES MELLITUS WITHOUT COMPLICATION, WITH LONG-TERM CURRENT USE OF INSULIN: ICD-10-CM

## 2025-08-04 DIAGNOSIS — Z79.4 TYPE 2 DIABETES MELLITUS WITHOUT COMPLICATION, WITH LONG-TERM CURRENT USE OF INSULIN: ICD-10-CM

## 2025-08-04 RX ORDER — PEN NEEDLE, DIABETIC 30 GX3/16"
NEEDLE, DISPOSABLE MISCELLANEOUS
Qty: 400 EACH | Refills: 3 | Status: SHIPPED | OUTPATIENT
Start: 2025-08-04

## 2025-08-11 ENCOUNTER — APPOINTMENT (OUTPATIENT)
Dept: PRIMARY CARE | Facility: CLINIC | Age: 45
End: 2025-08-11
Payer: COMMERCIAL

## 2025-08-11 ENCOUNTER — APPOINTMENT (OUTPATIENT)
Dept: RADIOLOGY | Facility: CLINIC | Age: 45
End: 2025-08-11
Payer: COMMERCIAL

## 2025-08-18 ENCOUNTER — APPOINTMENT (OUTPATIENT)
Dept: RADIOLOGY | Facility: CLINIC | Age: 45
End: 2025-08-18
Payer: COMMERCIAL

## 2025-08-18 DIAGNOSIS — J18.9 MULTIFOCAL PNEUMONIA: ICD-10-CM

## 2025-08-18 DIAGNOSIS — F17.210 CIGARETTE NICOTINE DEPENDENCE WITHOUT COMPLICATION: ICD-10-CM

## 2025-08-18 DIAGNOSIS — J96.01 ACUTE HYPOXEMIC RESPIRATORY FAILURE: ICD-10-CM

## 2025-08-18 PROCEDURE — 71250 CT THORAX DX C-: CPT

## 2025-08-18 PROCEDURE — 71250 CT THORAX DX C-: CPT | Performed by: RADIOLOGY

## 2025-08-20 PROBLEM — J44.9 OBSTRUCTIVE LUNG DISEASE (MULTI): Status: ACTIVE | Noted: 2025-08-20

## 2025-08-20 PROBLEM — J84.10 GRANULOMATOUS LUNG DISEASE (MULTI): Status: ACTIVE | Noted: 2025-08-20

## 2025-08-20 ASSESSMENT — ENCOUNTER SYMPTOMS
SLEEP DISTURBANCE: 1
FEVER: 0
UNEXPECTED WEIGHT CHANGE: 0
COUGH: 1
WHEEZING: 0
CHILLS: 0
SHORTNESS OF BREATH: 0

## 2025-08-21 ENCOUNTER — OFFICE VISIT (OUTPATIENT)
Dept: PULMONOLOGY | Facility: CLINIC | Age: 45
End: 2025-08-21
Payer: COMMERCIAL

## 2025-08-21 VITALS — OXYGEN SATURATION: 96 % | DIASTOLIC BLOOD PRESSURE: 87 MMHG | SYSTOLIC BLOOD PRESSURE: 123 MMHG | HEART RATE: 91 BPM

## 2025-08-21 DIAGNOSIS — J84.10 GRANULOMATOUS LUNG DISEASE (MULTI): ICD-10-CM

## 2025-08-21 DIAGNOSIS — G47.33 OBSTRUCTIVE SLEEP APNEA (ADULT) (PEDIATRIC): ICD-10-CM

## 2025-08-21 DIAGNOSIS — J18.9 MULTIFOCAL PNEUMONIA: ICD-10-CM

## 2025-08-21 DIAGNOSIS — F17.210 CIGARETTE NICOTINE DEPENDENCE WITHOUT COMPLICATION: ICD-10-CM

## 2025-08-21 DIAGNOSIS — J44.9 OBSTRUCTIVE LUNG DISEASE (MULTI): Primary | ICD-10-CM

## 2025-08-21 PROCEDURE — 99215 OFFICE O/P EST HI 40 MIN: CPT | Performed by: HOSPITALIST

## 2025-08-21 PROCEDURE — 3074F SYST BP LT 130 MM HG: CPT | Performed by: HOSPITALIST

## 2025-08-21 PROCEDURE — 3079F DIAST BP 80-89 MM HG: CPT | Performed by: HOSPITALIST

## 2025-08-21 PROCEDURE — 99212 OFFICE O/P EST SF 10 MIN: CPT

## 2025-08-21 RX ORDER — FLUTICASONE FUROATE, UMECLIDINIUM BROMIDE AND VILANTEROL TRIFENATATE 200; 62.5; 25 UG/1; UG/1; UG/1
1 POWDER RESPIRATORY (INHALATION) DAILY
Qty: 1 EACH | Refills: 11 | Status: SHIPPED | OUTPATIENT
Start: 2025-08-21 | End: 2025-08-22

## 2025-08-21 RX ORDER — DIPHENHYDRAMINE HCL 25 MG
4 CAPSULE ORAL AS NEEDED
Qty: 100 EACH | Refills: 0 | Status: SHIPPED | OUTPATIENT
Start: 2025-08-21 | End: 2025-09-20

## 2025-08-21 ASSESSMENT — ASTHMA QUESTIONNAIRES
QUESTION_5 LAST FOUR WEEKS HOW WOULD YOU RATE YOUR ASTHMA CONTROL: POORLY CONTROLLED
QUESTION_1 LAST FOUR WEEKS HOW MUCH OF THE TIME DID YOUR ASTHMA KEEP YOU FROM GETTING AS MUCH DONE AT WORK, SCHOOL OR AT HOME: ALL OF THE TIME
QUESTION_2 LAST FOUR WEEKS HOW OFTEN HAVE YOU HAD SHORTNESS OF BREATH: MORE THAN ONCE A DAY
ACT_TOTALSCORE: 6

## 2025-08-21 ASSESSMENT — PAIN SCALES - GENERAL: PAINLEVEL_OUTOF10: 6

## 2025-08-24 LAB
1,3 BETA GLUCAN SER QL: NEGATIVE
1,3 BETA GLUCAN SER-MCNC: 36 PG/ML
ACE SERPL-CCNC: NORMAL U/L
GALACTOMANNAN AG SERPL IA-ACNC: NORMAL
GALACTOMANNAN AG SERPL QL IA: NORMAL
H CAPSUL AG UR-MCNC: NORMAL NG/ML
H CAPSUL H AB SER QL ID: NORMAL
H CAPSUL M AB SER QL ID: NORMAL
H CAPSUL MYC AB TITR SER CF: NORMAL {TITER}
H CAPSUL YST AB TITR SER CF: NORMAL {TITER}
IGNF NEG CNTRL BLD: NORMAL
M TB IFN-G BLD-IMP: NEGATIVE
MITOGEN IGNF.SPOT COUNT BLD: NORMAL
QUEST PANEL A SPOT COUNT: 1
QUEST PANEL B SPOT COUNT: 2

## 2025-08-25 ENCOUNTER — APPOINTMENT (OUTPATIENT)
Dept: NUTRITION | Facility: HOSPITAL | Age: 45
End: 2025-08-25
Payer: COMMERCIAL

## 2025-08-28 LAB
1,3 BETA GLUCAN SER QL: NEGATIVE
1,3 BETA GLUCAN SER-MCNC: 36 PG/ML
ACE SERPL-CCNC: 33 U/L (ref 9–67)
GALACTOMANNAN AG SERPL IA-ACNC: 0.04
GALACTOMANNAN AG SERPL QL IA: NOT DETECTED
H CAPSUL AG UR-MCNC: <0.2 NG/ML
H CAPSUL H AB SER QL ID: NEGATIVE
H CAPSUL M AB SER QL ID: NEGATIVE
H CAPSUL MYC AB TITR SER CF: NORMAL {TITER}
H CAPSUL YST AB TITR SER CF: NORMAL {TITER}
IGNF NEG CNTRL BLD: NORMAL
M TB IFN-G BLD-IMP: NEGATIVE
MITOGEN IGNF.SPOT COUNT BLD: NORMAL
QUEST PANEL A SPOT COUNT: 1
QUEST PANEL B SPOT COUNT: 2

## 2025-09-12 ENCOUNTER — APPOINTMENT (OUTPATIENT)
Dept: PRIMARY CARE | Facility: CLINIC | Age: 45
End: 2025-09-12
Payer: COMMERCIAL

## 2025-09-18 ENCOUNTER — APPOINTMENT (OUTPATIENT)
Dept: PRIMARY CARE | Facility: CLINIC | Age: 45
End: 2025-09-18
Payer: COMMERCIAL

## 2025-12-04 ENCOUNTER — APPOINTMENT (OUTPATIENT)
Dept: PULMONOLOGY | Facility: CLINIC | Age: 45
End: 2025-12-04
Payer: COMMERCIAL